# Patient Record
Sex: MALE | Race: WHITE | NOT HISPANIC OR LATINO | Employment: OTHER | URBAN - NONMETROPOLITAN AREA
[De-identification: names, ages, dates, MRNs, and addresses within clinical notes are randomized per-mention and may not be internally consistent; named-entity substitution may affect disease eponyms.]

---

## 2017-02-06 RX ORDER — ATORVASTATIN CALCIUM 20 MG/1
TABLET, FILM COATED ORAL
Qty: 30 TABLET | Refills: 6 | Status: ON HOLD | OUTPATIENT
Start: 2017-02-06 | End: 2019-01-01

## 2017-03-10 PROBLEM — I25.10 CORONARY ARTERY DISEASE INVOLVING NATIVE CORONARY ARTERY: Status: ACTIVE | Noted: 2017-03-10

## 2017-03-10 PROBLEM — E78.5 HYPERLIPIDEMIA: Status: ACTIVE | Noted: 2017-03-10

## 2017-03-10 PROBLEM — R07.9 CHEST PAIN, EXERTIONAL: Status: ACTIVE | Noted: 2017-03-10

## 2017-03-10 PROBLEM — I20.9 ANGINA PECTORIS (HCC): Status: ACTIVE | Noted: 2017-03-10

## 2017-03-10 PROBLEM — I10 HYPERTENSION: Status: ACTIVE | Noted: 2017-03-10

## 2017-03-10 PROBLEM — Z95.1 AORTOCORONARY BYPASS STATUS: Status: ACTIVE | Noted: 2017-03-10

## 2017-03-10 PROBLEM — R06.02 SHORTNESS OF BREATH: Status: ACTIVE | Noted: 2017-03-10

## 2017-03-10 PROBLEM — I48.91 ATRIAL FIBRILLATION (HCC): Status: ACTIVE | Noted: 2017-03-10

## 2017-03-10 PROBLEM — N18.9 CHRONIC KIDNEY FAILURE: Status: ACTIVE | Noted: 2017-03-10

## 2017-03-10 PROBLEM — Z72.0 TOBACCO USE: Status: ACTIVE | Noted: 2017-03-10

## 2017-03-10 PROBLEM — E11.9 TYPE 2 DIABETES MELLITUS (HCC): Status: ACTIVE | Noted: 2017-03-10

## 2017-03-10 PROBLEM — Z98.61 CORONARY ANGIOPLASTY STATUS: Status: ACTIVE | Noted: 2017-03-10

## 2017-03-13 ENCOUNTER — OFFICE VISIT (OUTPATIENT)
Dept: CARDIOLOGY | Facility: CLINIC | Age: 77
End: 2017-03-13

## 2017-03-13 VITALS
SYSTOLIC BLOOD PRESSURE: 118 MMHG | BODY MASS INDEX: 34.72 KG/M2 | WEIGHT: 262 LBS | HEIGHT: 73 IN | HEART RATE: 89 BPM | DIASTOLIC BLOOD PRESSURE: 61 MMHG

## 2017-03-13 DIAGNOSIS — I48.0 PAROXYSMAL ATRIAL FIBRILLATION (HCC): ICD-10-CM

## 2017-03-13 DIAGNOSIS — I25.10 CORONARY ARTERY DISEASE INVOLVING NATIVE CORONARY ARTERY OF NATIVE HEART WITHOUT ANGINA PECTORIS: Primary | ICD-10-CM

## 2017-03-13 DIAGNOSIS — I10 ESSENTIAL HYPERTENSION: ICD-10-CM

## 2017-03-13 DIAGNOSIS — E78.5 HYPERLIPIDEMIA, UNSPECIFIED HYPERLIPIDEMIA TYPE: ICD-10-CM

## 2017-03-13 PROCEDURE — 93000 ELECTROCARDIOGRAM COMPLETE: CPT | Performed by: INTERNAL MEDICINE

## 2017-03-13 PROCEDURE — 99213 OFFICE O/P EST LOW 20 MIN: CPT | Performed by: INTERNAL MEDICINE

## 2017-03-13 NOTE — PROGRESS NOTES
Subjective:     Encounter Date: 03/13/2017      Patient ID: Ha Agrawal is a 76 y.o. male.  With coronary artery disease, status post coronary artery bypass grafting, stent placement, hypertension, hyperlipidemia, paroxysmal atrial fibrillation who presents today for routine follow-up.    Chief Complaint: Routine follow-up    Coronary Artery Disease   Presents for follow-up visit. Symptoms include shortness of breath (Chronic, mild, unchanged). Pertinent negatives include no chest pain, chest pressure, chest tightness, dizziness, leg swelling, muscle weakness, palpitations or weight gain. Risk factors do not include hypertension. The symptoms have been stable. Compliance with diet is variable. Compliance with exercise is variable. Compliance with medications is good.   Hypertension   This is a chronic problem. The problem is unchanged. The problem is controlled. Associated symptoms include shortness of breath (Chronic, mild, unchanged). Pertinent negatives include no chest pain, headaches, malaise/fatigue, neck pain, orthopnea, palpitations, peripheral edema or PND. There are no associated agents to hypertension. Risk factors for coronary artery disease include diabetes mellitus, male gender and obesity. Past treatments include angiotensin blockers, beta blockers and diuretics. The current treatment provides significant improvement. There are no compliance problems.  Hypertensive end-organ damage includes CAD/MI. There is no history of angina or heart failure. Identifiable causes of hypertension include chronic renal disease.   Atrial Fibrillation   Presents for follow-up visit. Symptoms include shortness of breath (Chronic, mild, unchanged). Symptoms are negative for bradycardia, chest pain, dizziness, hemodynamic instability, hypertension, hypotension, palpitations, syncope, tachycardia and weakness. The symptoms have been stable. Past medical history includes atrial fibrillation and CAD. There are no  medication compliance problems.     The patient presents today for routine follow-up of his coronary artery disease as well as atrial fibrillation.  Altogether, the patient notes that he has been doing quite well recently.  He has not had any significant chest discomfort, shortness of breath, dyspnea on exertion (other than mild, chronic symptoms), orthopnea, paroxysmal nocturnal dyspnea, edema, lightheadedness, dizziness, syncope.  The patient was noted to be in atrial fibrillation today but has not been aware of being in atrial fibrillation and has had no recent palpitations.  He remains on Xarelto for anticoagulant therapy and has tolerated this well without any bleeding difficulties.  He has not had any trouble with his medications.  His blood pressure has been under good control.    Current Outpatient Prescriptions:   •  amiodarone (PACERONE) 200 MG tablet, Take 200 mg by mouth Daily., Disp: , Rfl:   •  atorvastatin (LIPITOR) 20 MG tablet, TAKE 1 TABLET BY MOUTH AT BEDTIME, Disp: 30 tablet, Rfl: 6  •  diazepam (VALIUM) 5 MG tablet, Take 5 mg by mouth daily as needed for anxiety., Disp: , Rfl:   •  fenofibrate (TRICOR) 145 MG tablet, Take 145 mg by mouth daily., Disp: , Rfl:   •  furosemide (LASIX) 40 MG tablet, Take 40 mg by mouth daily., Disp: , Rfl:   •  insulin glargine (LANTUS) 100 UNIT/ML injection, Inject  under the skin daily., Disp: , Rfl:   •  insulin regular (NOVOLIN R) 100 UNIT/ML injection, Inject  under the skin daily., Disp: , Rfl:   •  isosorbide mononitrate (IMDUR) 30 MG 24 hr tablet, Take 30 mg by mouth daily., Disp: , Rfl:   •  Loratadine 10 MG capsule, Take 10 mg by mouth daily., Disp: , Rfl:   •  metoprolol succinate XL (TOPROL-XL) 25 MG 24 hr tablet, Take 75 mg by mouth daily., Disp: , Rfl:   •  Multiple Vitamins-Minerals (MULTIVITAMIN ADULT PO), Take  by mouth daily., Disp: , Rfl:   •  olmesartan (BENICAR) 20 MG tablet, Take 20 mg by mouth daily., Disp: , Rfl:   •  Omega-3 Fatty Acids  (FISH OIL) 1200 MG capsule delayed-release, Take 1,200 mg by mouth 2 (two) times a day., Disp: , Rfl:   •  pantoprazole (PROTONIX) 40 MG EC tablet, Take 40 mg by mouth daily., Disp: , Rfl:   •  potassium chloride (K-DUR,KLOR-CON) 10 MEQ CR tablet, Take 10 mEq by mouth daily., Disp: , Rfl:   •  ranolazine (RANEXA) 500 MG 12 hr tablet, Take 1 tablet by mouth 2 (two) times a day., Disp: 60 tablet, Rfl: 11  •  rivaroxaban (XARELTO) 15 MG tablet, Take 15 mg by mouth Daily., Disp: , Rfl:   •  sertraline (ZOLOFT) 25 MG tablet, Take 25 mg by mouth daily., Disp: , Rfl:   •  tamsulosin (FLOMAX) 0.4 MG capsule 24 hr capsule, Take 1 capsule by mouth daily., Disp: , Rfl:   •  vitamin D (ERGOCALCIFEROL) 16838 UNITS capsule capsule, Take 50,000 Units by mouth 1 (one) time per week., Disp: , Rfl:   •  temazepam (RESTORIL) 15 MG capsule, Take 15 mg by mouth at night as needed for sleep., Disp: , Rfl:   •  vitamin E 1000 UNIT capsule, Take 1,000 Units by mouth daily., Disp: , Rfl:     Allergies   Allergen Reactions   • Adhesive Tape Dermatitis   • Penicillins      Review of Systems   Constitution: Negative for chills, fever, weakness, malaise/fatigue, night sweats, weight gain and weight loss.   HENT: Negative for congestion, headaches and sore throat.    Cardiovascular: Positive for dyspnea on exertion (Chronic, mild, unchanged). Negative for chest pain, claudication, irregular heartbeat, leg swelling, orthopnea, palpitations, paroxysmal nocturnal dyspnea and syncope.   Respiratory: Positive for shortness of breath (Chronic, mild, unchanged). Negative for chest tightness, cough, hemoptysis and wheezing.    Endocrine: Negative for cold intolerance, heat intolerance, polydipsia and polyuria.   Hematologic/Lymphatic: Negative for bleeding problem. Does not bruise/bleed easily.   Musculoskeletal: Negative for muscle weakness and neck pain.   Gastrointestinal: Negative for abdominal pain, hematemesis, hematochezia, melena, nausea and  "vomiting.   Genitourinary: Negative for bladder incontinence, dysuria and hematuria.   Neurological: Negative for dizziness, loss of balance, numbness, paresthesias and seizures.       ECG 12 Lead  Date/Time: 3/13/2017 1:49 PM  Performed by: TYLOR MARISCAL  Authorized by: TYLOR MARISCAL   Comparison: compared with previous ECG from 9/1/2015  Comparison to previous ECG: Atrial fibrillation now present  Rhythm: sinus rhythm  Rate: normal  BPM: 89  Conduction: conduction normal  QRS axis: right  Clinical impression: abnormal ECG  Comments: Nonspecific ST and T-wave abnormalities             Objective:     Physical Exam   Constitutional: He is oriented to person, place, and time. He appears well-developed and well-nourished. No distress.   HENT:   Head: Normocephalic and atraumatic.   Mouth/Throat: Oropharynx is clear and moist.   Eyes: EOM are normal. Pupils are equal, round, and reactive to light.   Neck: Normal range of motion. Neck supple. No JVD present. No thyromegaly present.   Cardiovascular: Normal rate, regular rhythm, S1 normal, S2 normal, normal heart sounds and intact distal pulses.  Exam reveals no gallop and no friction rub.    No murmur heard.  Pulmonary/Chest: Effort normal and breath sounds normal.   Abdominal: Soft. Bowel sounds are normal. He exhibits no distension. There is no tenderness.   Musculoskeletal: Normal range of motion. He exhibits no edema.   Neurological: He is alert and oriented to person, place, and time. No cranial nerve deficit.   Skin: Skin is warm and dry. No rash noted. No cyanosis or erythema. Nails show no clubbing.   Psychiatric: He has a normal mood and affect.   Vitals reviewed.    Visit Vitals   • /61 (BP Location: Left arm, Patient Position: Sitting)   • Pulse 89   • Ht 73\" (185.4 cm)   • Wt 262 lb (119 kg)   • BMI 34.57 kg/m2     Data/Lab Review:     Echocardiogram 3/3/15: Left ventricular hypertrophy, normal left ventricular systolic function, " diastolic dysfunction, mild left atrial enlargement    Labs from 3/6/17: White blood cell count 4.9, hemoglobin 14, platelets 230, normal liver function test, LDL cholesterol 103, HDL cholesterol 49, hemoglobin A1c of 5.8, creatinine 1.76, potassium 4.7      Assessment:          Diagnosis Plan   1. Coronary artery disease involving native coronary artery of native heart without angina pectoris  ECG 12 Lead   2. Paroxysmal atrial fibrillation  ECG 12 Lead   3. Essential hypertension     4. Hyperlipidemia, unspecified hyperlipidemia type          Plan:       1.  Coronary artery disease: The patient remains clinically stable at this time.  Continue current medical therapy and follow yearly unless needed sooner.    2.  Paroxysmal atrial fibrillation: The patient remains stable at this time and tolerating anticoagulant therapy well.  Continue current medications.    3.  Essential hypertension: The patient's blood pressure remains under excellent control.  Continue current medicines.    4.  Hyperlipidemia, unspecified: The patient remains on statin therapy and his lipids are followed by his primary care physician.  His most recent lipid panel is listed above.    Follow-up: 12 months unless needed sooner    EMR Dragon/Transcription disclaimer: Much of this encounter note is an electronic transcription/translation of spoken language to printed text. The electronic translation of spoken language may permit erroneous, or at times, nonsensical words or phrases to be inadvertently transcribed; although I have reviewed the note for such errors, some may still exist.

## 2017-12-30 ENCOUNTER — APPOINTMENT (OUTPATIENT)
Dept: GENERAL RADIOLOGY | Facility: HOSPITAL | Age: 77
End: 2017-12-30

## 2017-12-30 ENCOUNTER — HOSPITAL ENCOUNTER (OUTPATIENT)
Facility: HOSPITAL | Age: 77
Setting detail: OBSERVATION
Discharge: HOME | End: 2017-12-31
Attending: FAMILY MEDICINE | Admitting: INTERNAL MEDICINE

## 2017-12-30 DIAGNOSIS — I20.0 UNSTABLE ANGINA (HCC): Primary | ICD-10-CM

## 2017-12-30 DIAGNOSIS — I25.110 CORONARY ARTERY DISEASE INVOLVING NATIVE CORONARY ARTERY OF NATIVE HEART WITH UNSTABLE ANGINA PECTORIS (HCC): ICD-10-CM

## 2017-12-30 DIAGNOSIS — E11.22 TYPE 2 DIABETES MELLITUS WITH STAGE 3 CHRONIC KIDNEY DISEASE, WITH LONG-TERM CURRENT USE OF INSULIN (HCC): Chronic | ICD-10-CM

## 2017-12-30 DIAGNOSIS — Z79.4 TYPE 2 DIABETES MELLITUS WITH STAGE 3 CHRONIC KIDNEY DISEASE, WITH LONG-TERM CURRENT USE OF INSULIN (HCC): Chronic | ICD-10-CM

## 2017-12-30 DIAGNOSIS — N18.30 TYPE 2 DIABETES MELLITUS WITH STAGE 3 CHRONIC KIDNEY DISEASE, WITH LONG-TERM CURRENT USE OF INSULIN (HCC): Chronic | ICD-10-CM

## 2017-12-30 PROBLEM — I48.20 CHRONIC ATRIAL FIBRILLATION (HCC): Chronic | Status: ACTIVE | Noted: 2017-03-10

## 2017-12-30 PROBLEM — I10 ESSENTIAL HYPERTENSION: Chronic | Status: ACTIVE | Noted: 2017-03-10

## 2017-12-30 PROBLEM — E78.2 MIXED HYPERLIPIDEMIA: Chronic | Status: ACTIVE | Noted: 2017-03-10

## 2017-12-30 LAB
ALBUMIN SERPL-MCNC: 4.6 G/DL (ref 3.5–5)
ALBUMIN/GLOB SERPL: 1.3 G/DL (ref 1.1–2.5)
ALP SERPL-CCNC: 79 U/L (ref 24–120)
ALT SERPL W P-5'-P-CCNC: 28 U/L (ref 0–54)
ANION GAP SERPL CALCULATED.3IONS-SCNC: 14 MMOL/L (ref 4–13)
AST SERPL-CCNC: 31 U/L (ref 7–45)
BASOPHILS # BLD AUTO: 0.05 10*3/MM3 (ref 0–0.2)
BASOPHILS NFR BLD AUTO: 0.9 % (ref 0–2)
BILIRUB SERPL-MCNC: 0.4 MG/DL (ref 0.1–1)
BUN BLD-MCNC: 23 MG/DL (ref 5–21)
BUN/CREAT SERPL: 16 (ref 7–25)
CALCIUM SPEC-SCNC: 9.6 MG/DL (ref 8.4–10.4)
CHLORIDE SERPL-SCNC: 105 MMOL/L (ref 98–110)
CO2 SERPL-SCNC: 25 MMOL/L (ref 24–31)
CREAT BLD-MCNC: 1.44 MG/DL (ref 0.5–1.4)
DEPRECATED RDW RBC AUTO: 52.9 FL (ref 40–54)
EOSINOPHIL # BLD AUTO: 0.18 10*3/MM3 (ref 0–0.7)
EOSINOPHIL NFR BLD AUTO: 3.2 % (ref 0–4)
ERYTHROCYTE [DISTWIDTH] IN BLOOD BY AUTOMATED COUNT: 14.5 % (ref 12–15)
GFR SERPL CREATININE-BSD FRML MDRD: 48 ML/MIN/1.73
GLOBULIN UR ELPH-MCNC: 3.6 GM/DL
GLUCOSE BLD-MCNC: 96 MG/DL (ref 70–100)
GLUCOSE BLDC GLUCOMTR-MCNC: 139 MG/DL (ref 70–130)
GLUCOSE BLDC GLUCOMTR-MCNC: 152 MG/DL (ref 70–130)
GLUCOSE BLDC GLUCOMTR-MCNC: 87 MG/DL (ref 70–130)
HBA1C MFR BLD: 6.2 %
HCT VFR BLD AUTO: 38.6 % (ref 40–52)
HGB BLD-MCNC: 12.7 G/DL (ref 14–18)
HOLD SPECIMEN: NORMAL
HOLD SPECIMEN: NORMAL
IMM GRANULOCYTES # BLD: 0.02 10*3/MM3 (ref 0–0.03)
IMM GRANULOCYTES NFR BLD: 0.4 % (ref 0–5)
LYMPHOCYTES # BLD AUTO: 1.71 10*3/MM3 (ref 0.72–4.86)
LYMPHOCYTES NFR BLD AUTO: 30.2 % (ref 15–45)
MCH RBC QN AUTO: 33.3 PG (ref 28–32)
MCHC RBC AUTO-ENTMCNC: 32.9 G/DL (ref 33–36)
MCV RBC AUTO: 101.3 FL (ref 82–95)
MONOCYTES # BLD AUTO: 0.71 10*3/MM3 (ref 0.19–1.3)
MONOCYTES NFR BLD AUTO: 12.5 % (ref 4–12)
NEUTROPHILS # BLD AUTO: 2.99 10*3/MM3 (ref 1.87–8.4)
NEUTROPHILS NFR BLD AUTO: 52.8 % (ref 39–78)
NRBC BLD MANUAL-RTO: 0 /100 WBC (ref 0–0)
PLATELET # BLD AUTO: 213 10*3/MM3 (ref 130–400)
PMV BLD AUTO: 9.9 FL (ref 6–12)
POTASSIUM BLD-SCNC: 3.9 MMOL/L (ref 3.5–5.3)
PROT SERPL-MCNC: 8.2 G/DL (ref 6.3–8.7)
RBC # BLD AUTO: 3.81 10*6/MM3 (ref 4.8–5.9)
SODIUM BLD-SCNC: 144 MMOL/L (ref 135–145)
TROPONIN I SERPL-MCNC: <0.012 NG/ML (ref 0–0.03)
WBC NRBC COR # BLD: 5.66 10*3/MM3 (ref 4.8–10.8)
WHOLE BLOOD HOLD SPECIMEN: NORMAL
WHOLE BLOOD HOLD SPECIMEN: NORMAL

## 2017-12-30 PROCEDURE — G0378 HOSPITAL OBSERVATION PER HR: HCPCS

## 2017-12-30 PROCEDURE — 84484 ASSAY OF TROPONIN QUANT: CPT | Performed by: INTERNAL MEDICINE

## 2017-12-30 PROCEDURE — 71010 HC CHEST PA OR AP: CPT

## 2017-12-30 PROCEDURE — 93005 ELECTROCARDIOGRAM TRACING: CPT | Performed by: INTERNAL MEDICINE

## 2017-12-30 PROCEDURE — 84484 ASSAY OF TROPONIN QUANT: CPT | Performed by: FAMILY MEDICINE

## 2017-12-30 PROCEDURE — 85025 COMPLETE CBC W/AUTO DIFF WBC: CPT | Performed by: FAMILY MEDICINE

## 2017-12-30 PROCEDURE — 83036 HEMOGLOBIN GLYCOSYLATED A1C: CPT | Performed by: NURSE PRACTITIONER

## 2017-12-30 PROCEDURE — 99285 EMERGENCY DEPT VISIT HI MDM: CPT

## 2017-12-30 PROCEDURE — 93005 ELECTROCARDIOGRAM TRACING: CPT | Performed by: FAMILY MEDICINE

## 2017-12-30 PROCEDURE — 82962 GLUCOSE BLOOD TEST: CPT

## 2017-12-30 PROCEDURE — 93010 ELECTROCARDIOGRAM REPORT: CPT | Performed by: INTERNAL MEDICINE

## 2017-12-30 PROCEDURE — 80053 COMPREHEN METABOLIC PANEL: CPT | Performed by: FAMILY MEDICINE

## 2017-12-30 PROCEDURE — 63710000001 INSULIN DETEMIR PER 5 UNITS: Performed by: NURSE PRACTITIONER

## 2017-12-30 PROCEDURE — 99220 PR INITIAL OBSERVATION CARE/DAY 70 MINUTES: CPT | Performed by: INTERNAL MEDICINE

## 2017-12-30 RX ORDER — AMIODARONE HYDROCHLORIDE 200 MG/1
200 TABLET ORAL DAILY
Status: DISCONTINUED | OUTPATIENT
Start: 2017-12-30 | End: 2017-12-31 | Stop reason: HOSPADM

## 2017-12-30 RX ORDER — SODIUM CHLORIDE 0.9 % (FLUSH) 0.9 %
10 SYRINGE (ML) INJECTION AS NEEDED
Status: DISCONTINUED | OUTPATIENT
Start: 2017-12-30 | End: 2017-12-31 | Stop reason: HOSPADM

## 2017-12-30 RX ORDER — DIAZEPAM 5 MG/1
5 TABLET ORAL DAILY PRN
Status: DISCONTINUED | OUTPATIENT
Start: 2017-12-30 | End: 2017-12-31 | Stop reason: HOSPADM

## 2017-12-30 RX ORDER — TEMAZEPAM 15 MG/1
15 CAPSULE ORAL NIGHTLY PRN
Status: DISCONTINUED | OUTPATIENT
Start: 2017-12-30 | End: 2017-12-31 | Stop reason: HOSPADM

## 2017-12-30 RX ORDER — TAMSULOSIN HYDROCHLORIDE 0.4 MG/1
0.4 CAPSULE ORAL DAILY
Status: DISCONTINUED | OUTPATIENT
Start: 2017-12-30 | End: 2017-12-31 | Stop reason: HOSPADM

## 2017-12-30 RX ORDER — ISOSORBIDE MONONITRATE 30 MG/1
30 TABLET, EXTENDED RELEASE ORAL DAILY
Status: DISCONTINUED | OUTPATIENT
Start: 2017-12-30 | End: 2017-12-31 | Stop reason: HOSPADM

## 2017-12-30 RX ORDER — MULTIVIT WITH MINERALS/LUTEIN
1000 TABLET ORAL DAILY
Status: DISCONTINUED | OUTPATIENT
Start: 2017-12-30 | End: 2017-12-31 | Stop reason: HOSPADM

## 2017-12-30 RX ORDER — RANOLAZINE 500 MG/1
500 TABLET, EXTENDED RELEASE ORAL EVERY 12 HOURS SCHEDULED
Status: DISCONTINUED | OUTPATIENT
Start: 2017-12-30 | End: 2017-12-31 | Stop reason: HOSPADM

## 2017-12-30 RX ORDER — SODIUM CHLORIDE 0.9 % (FLUSH) 0.9 %
1-10 SYRINGE (ML) INJECTION AS NEEDED
Status: DISCONTINUED | OUTPATIENT
Start: 2017-12-30 | End: 2017-12-31 | Stop reason: HOSPADM

## 2017-12-30 RX ORDER — NICOTINE POLACRILEX 4 MG
15 LOZENGE BUCCAL
Status: DISCONTINUED | OUTPATIENT
Start: 2017-12-30 | End: 2017-12-31 | Stop reason: HOSPADM

## 2017-12-30 RX ORDER — OLMESARTAN MEDOXOMIL 20 MG/1
20 TABLET ORAL DAILY
Status: DISCONTINUED | OUTPATIENT
Start: 2017-12-30 | End: 2017-12-31 | Stop reason: HOSPADM

## 2017-12-30 RX ORDER — POTASSIUM CHLORIDE 750 MG/1
10 CAPSULE, EXTENDED RELEASE ORAL DAILY
Status: DISCONTINUED | OUTPATIENT
Start: 2017-12-30 | End: 2017-12-31 | Stop reason: HOSPADM

## 2017-12-30 RX ORDER — FUROSEMIDE 40 MG/1
40 TABLET ORAL DAILY
Status: DISCONTINUED | OUTPATIENT
Start: 2017-12-30 | End: 2017-12-31 | Stop reason: HOSPADM

## 2017-12-30 RX ORDER — PANTOPRAZOLE SODIUM 40 MG/1
40 TABLET, DELAYED RELEASE ORAL DAILY
Status: DISCONTINUED | OUTPATIENT
Start: 2017-12-30 | End: 2017-12-31 | Stop reason: HOSPADM

## 2017-12-30 RX ORDER — ATORVASTATIN CALCIUM 40 MG/1
40 TABLET, FILM COATED ORAL NIGHTLY
Status: DISCONTINUED | OUTPATIENT
Start: 2017-12-30 | End: 2017-12-31 | Stop reason: HOSPADM

## 2017-12-30 RX ORDER — DEXTROSE MONOHYDRATE 25 G/50ML
25 INJECTION, SOLUTION INTRAVENOUS
Status: DISCONTINUED | OUTPATIENT
Start: 2017-12-30 | End: 2017-12-31 | Stop reason: HOSPADM

## 2017-12-30 RX ADMIN — PANTOPRAZOLE SODIUM 40 MG: 40 TABLET, DELAYED RELEASE ORAL at 10:46

## 2017-12-30 RX ADMIN — AMIODARONE HYDROCHLORIDE 200 MG: 200 TABLET ORAL at 10:47

## 2017-12-30 RX ADMIN — TAMSULOSIN HYDROCHLORIDE 0.4 MG: 0.4 CAPSULE ORAL at 10:47

## 2017-12-30 RX ADMIN — RANOLAZINE 500 MG: 500 TABLET, FILM COATED, EXTENDED RELEASE ORAL at 10:47

## 2017-12-30 RX ADMIN — DESMOPRESSIN ACETATE 40 MG: 0.2 TABLET ORAL at 20:53

## 2017-12-30 RX ADMIN — Medication 1000 UNITS: at 10:46

## 2017-12-30 RX ADMIN — OLMESARTAN MEDOXOMIL 20 MG: 20 TABLET, FILM COATED ORAL at 10:46

## 2017-12-30 RX ADMIN — ISOSORBIDE MONONITRATE 30 MG: 30 TABLET, EXTENDED RELEASE ORAL at 10:47

## 2017-12-30 RX ADMIN — RIVAROXABAN 15 MG: 15 TABLET, FILM COATED ORAL at 10:46

## 2017-12-30 RX ADMIN — SERTRALINE 25 MG: 50 TABLET, FILM COATED ORAL at 10:48

## 2017-12-30 RX ADMIN — RANOLAZINE 500 MG: 500 TABLET, FILM COATED, EXTENDED RELEASE ORAL at 20:53

## 2017-12-30 RX ADMIN — POTASSIUM CHLORIDE 10 MEQ: 750 CAPSULE, EXTENDED RELEASE ORAL at 10:46

## 2017-12-30 RX ADMIN — INSULIN DETEMIR 25 UNITS: 100 INJECTION, SOLUTION SUBCUTANEOUS at 21:27

## 2017-12-30 RX ADMIN — FUROSEMIDE 40 MG: 40 TABLET ORAL at 10:47

## 2017-12-31 ENCOUNTER — APPOINTMENT (OUTPATIENT)
Dept: CARDIOLOGY | Facility: HOSPITAL | Age: 77
End: 2017-12-31
Attending: INTERNAL MEDICINE

## 2017-12-31 VITALS
HEART RATE: 82 BPM | SYSTOLIC BLOOD PRESSURE: 126 MMHG | OXYGEN SATURATION: 96 % | RESPIRATION RATE: 18 BRPM | DIASTOLIC BLOOD PRESSURE: 86 MMHG | BODY MASS INDEX: 34.52 KG/M2 | HEIGHT: 74 IN | TEMPERATURE: 98.1 F | WEIGHT: 268.96 LBS

## 2017-12-31 LAB
ANION GAP SERPL CALCULATED.3IONS-SCNC: 12 MMOL/L (ref 4–13)
BH CV STRESS BP STAGE 1: NORMAL
BH CV STRESS BP STAGE 2: NORMAL
BH CV STRESS DOSE DOBUTAMINE STAGE 1: 10
BH CV STRESS DOSE DOBUTAMINE STAGE 2: 20
BH CV STRESS DURATION MIN STAGE 1: 2
BH CV STRESS DURATION MIN STAGE 2: 2
BH CV STRESS DURATION SEC STAGE 1: 0
BH CV STRESS DURATION SEC STAGE 2: 0
BH CV STRESS HR STAGE 1: 91
BH CV STRESS HR STAGE 2: 145
BH CV STRESS PROTOCOL 1: NORMAL
BH CV STRESS RECOVERY BP: NORMAL MMHG
BH CV STRESS RECOVERY HR: 86 BPM
BH CV STRESS STAGE 1: 1
BH CV STRESS STAGE 2: 2
BUN BLD-MCNC: 21 MG/DL (ref 5–21)
BUN/CREAT SERPL: 14.7 (ref 7–25)
CALCIUM SPEC-SCNC: 9.3 MG/DL (ref 8.4–10.4)
CHLORIDE SERPL-SCNC: 104 MMOL/L (ref 98–110)
CO2 SERPL-SCNC: 27 MMOL/L (ref 24–31)
CREAT BLD-MCNC: 1.43 MG/DL (ref 0.5–1.4)
GFR SERPL CREATININE-BSD FRML MDRD: 48 ML/MIN/1.73
GLUCOSE BLD-MCNC: 118 MG/DL (ref 70–100)
GLUCOSE BLDC GLUCOMTR-MCNC: 103 MG/DL (ref 70–130)
GLUCOSE BLDC GLUCOMTR-MCNC: 104 MG/DL (ref 70–130)
MAXIMAL PREDICTED HEART RATE: 145 BPM
PERCENT MAX PREDICTED HR: 100 %
POTASSIUM BLD-SCNC: 3.7 MMOL/L (ref 3.5–5.3)
SODIUM BLD-SCNC: 143 MMOL/L (ref 135–145)
STRESS BASELINE BP: NORMAL MMHG
STRESS BASELINE HR: 78 BPM
STRESS PERCENT HR: 118 %
STRESS POST EXERCISE DUR MIN: 6 MIN
STRESS POST EXERCISE DUR SEC: 12 SEC
STRESS POST PEAK BP: NORMAL MMHG
STRESS POST PEAK HR: 145 BPM
STRESS TARGET HR: 123 BPM

## 2017-12-31 PROCEDURE — 82962 GLUCOSE BLOOD TEST: CPT

## 2017-12-31 PROCEDURE — 93017 CV STRESS TEST TRACING ONLY: CPT

## 2017-12-31 PROCEDURE — 93350 STRESS TTE ONLY: CPT

## 2017-12-31 PROCEDURE — 93018 CV STRESS TEST I&R ONLY: CPT | Performed by: INTERNAL MEDICINE

## 2017-12-31 PROCEDURE — 25010000003 DOBUTAMINE PER 250 MG: Performed by: INTERNAL MEDICINE

## 2017-12-31 PROCEDURE — 99217 PR OBSERVATION CARE DISCHARGE MANAGEMENT: CPT | Performed by: INTERNAL MEDICINE

## 2017-12-31 PROCEDURE — 93350 STRESS TTE ONLY: CPT | Performed by: INTERNAL MEDICINE

## 2017-12-31 PROCEDURE — 93352 ADMIN ECG CONTRAST AGENT: CPT | Performed by: INTERNAL MEDICINE

## 2017-12-31 PROCEDURE — 80048 BASIC METABOLIC PNL TOTAL CA: CPT | Performed by: INTERNAL MEDICINE

## 2017-12-31 PROCEDURE — 25010000002 PERFLUTREN 6.52 MG/ML SUSPENSION: Performed by: INTERNAL MEDICINE

## 2017-12-31 PROCEDURE — G0378 HOSPITAL OBSERVATION PER HR: HCPCS

## 2017-12-31 RX ORDER — DOBUTAMINE HYDROCHLORIDE 100 MG/100ML
10-50 INJECTION INTRAVENOUS CONTINUOUS
Status: DISCONTINUED | OUTPATIENT
Start: 2017-12-31 | End: 2017-12-31 | Stop reason: HOSPADM

## 2017-12-31 RX ADMIN — ISOSORBIDE MONONITRATE 30 MG: 30 TABLET, EXTENDED RELEASE ORAL at 09:27

## 2017-12-31 RX ADMIN — TAMSULOSIN HYDROCHLORIDE 0.4 MG: 0.4 CAPSULE ORAL at 09:30

## 2017-12-31 RX ADMIN — AMIODARONE HYDROCHLORIDE 200 MG: 200 TABLET ORAL at 09:27

## 2017-12-31 RX ADMIN — FUROSEMIDE 40 MG: 40 TABLET ORAL at 09:27

## 2017-12-31 RX ADMIN — RANOLAZINE 500 MG: 500 TABLET, FILM COATED, EXTENDED RELEASE ORAL at 09:26

## 2017-12-31 RX ADMIN — POTASSIUM CHLORIDE 10 MEQ: 750 CAPSULE, EXTENDED RELEASE ORAL at 09:30

## 2017-12-31 RX ADMIN — Medication 10 MCG/KG/MIN: at 10:32

## 2017-12-31 RX ADMIN — Medication 1000 UNITS: at 09:27

## 2017-12-31 RX ADMIN — RIVAROXABAN 15 MG: 15 TABLET, FILM COATED ORAL at 09:26

## 2017-12-31 RX ADMIN — SERTRALINE 25 MG: 50 TABLET, FILM COATED ORAL at 09:26

## 2017-12-31 RX ADMIN — OLMESARTAN MEDOXOMIL 20 MG: 20 TABLET, FILM COATED ORAL at 09:27

## 2017-12-31 RX ADMIN — PERFLUTREN 10 MG: 6.52 INJECTION, SUSPENSION INTRAVENOUS at 10:33

## 2017-12-31 RX ADMIN — PANTOPRAZOLE SODIUM 40 MG: 40 TABLET, DELAYED RELEASE ORAL at 09:28

## 2017-12-31 RX ADMIN — METOPROLOL SUCCINATE 75 MG: 50 TABLET, FILM COATED, EXTENDED RELEASE ORAL at 09:26

## 2018-01-11 ENCOUNTER — OFFICE VISIT (OUTPATIENT)
Dept: CARDIOLOGY | Facility: CLINIC | Age: 78
End: 2018-01-11

## 2018-01-11 VITALS
OXYGEN SATURATION: 96 % | WEIGHT: 268 LBS | SYSTOLIC BLOOD PRESSURE: 110 MMHG | HEIGHT: 74 IN | HEART RATE: 82 BPM | BODY MASS INDEX: 34.39 KG/M2 | DIASTOLIC BLOOD PRESSURE: 64 MMHG

## 2018-01-11 DIAGNOSIS — N18.30 STAGE 3 CHRONIC KIDNEY DISEASE (HCC): Chronic | ICD-10-CM

## 2018-01-11 DIAGNOSIS — Z79.4 TYPE 2 DIABETES MELLITUS WITH STAGE 3 CHRONIC KIDNEY DISEASE, WITH LONG-TERM CURRENT USE OF INSULIN (HCC): Chronic | ICD-10-CM

## 2018-01-11 DIAGNOSIS — Z95.1 HX OF CABG: ICD-10-CM

## 2018-01-11 DIAGNOSIS — E11.22 TYPE 2 DIABETES MELLITUS WITH STAGE 3 CHRONIC KIDNEY DISEASE, WITH LONG-TERM CURRENT USE OF INSULIN (HCC): Chronic | ICD-10-CM

## 2018-01-11 DIAGNOSIS — R06.09 DYSPNEA ON EXERTION: ICD-10-CM

## 2018-01-11 DIAGNOSIS — I25.110 CORONARY ARTERY DISEASE INVOLVING NATIVE CORONARY ARTERY OF NATIVE HEART WITH UNSTABLE ANGINA PECTORIS (HCC): Primary | ICD-10-CM

## 2018-01-11 DIAGNOSIS — I10 ESSENTIAL HYPERTENSION: Chronic | ICD-10-CM

## 2018-01-11 DIAGNOSIS — N18.30 TYPE 2 DIABETES MELLITUS WITH STAGE 3 CHRONIC KIDNEY DISEASE, WITH LONG-TERM CURRENT USE OF INSULIN (HCC): Chronic | ICD-10-CM

## 2018-01-11 DIAGNOSIS — Z95.5 HISTORY OF CORONARY ARTERY STENT PLACEMENT: ICD-10-CM

## 2018-01-11 DIAGNOSIS — I48.20 CHRONIC ATRIAL FIBRILLATION (HCC): Chronic | ICD-10-CM

## 2018-01-11 DIAGNOSIS — Z79.01 CHRONIC ANTICOAGULATION: ICD-10-CM

## 2018-01-11 DIAGNOSIS — I50.32 CHRONIC DIASTOLIC CONGESTIVE HEART FAILURE (HCC): ICD-10-CM

## 2018-01-11 DIAGNOSIS — E78.2 MIXED HYPERLIPIDEMIA: Chronic | ICD-10-CM

## 2018-01-11 PROCEDURE — 99214 OFFICE O/P EST MOD 30 MIN: CPT | Performed by: NURSE PRACTITIONER

## 2018-01-11 RX ORDER — ISOSORBIDE MONONITRATE 60 MG/1
60 TABLET, EXTENDED RELEASE ORAL DAILY
Qty: 90 TABLET | Refills: 3 | Status: SHIPPED | OUTPATIENT
Start: 2018-01-11 | End: 2018-09-17 | Stop reason: SDUPTHER

## 2018-01-11 NOTE — PROGRESS NOTES
Subjective:     Encounter Date:01/11/2018      Patient ID: Ha Agrawal is a 77 y.o. male. He presents today for two week follow up of hospital discharge for chest pain with a negative lexiscan. He has a history of coronary artery disease s/p coronary artery bypass grafting and coronary artery stenting, chronic diastolic congestive heart failure, chronic atrial fibrillation on chronic anticoagulation, hypertension, hyperlipidemia, stage III chronic kidney disease, type II diabetes mellitus and obesity. He continues to complain of dyspnea with exertion and fatigue. He denies any additional chest pain since hospital discharge. He denies any palpitation, dizziness, syncope, orthopnea, PND or swelling. He reports that blood pressure and cholesterol are well controlled. He denies any signs of bleeding. He states that overall he still does not feel well.       Chief Complaint:  Coronary Artery Disease   Presents for follow-up visit. Pertinent negatives include no chest pain, chest pressure, chest tightness, dizziness, leg swelling, muscle weakness, palpitations, shortness of breath or weight gain. Risk factors include hyperlipidemia. Risk factors do not include hypertension. His past medical history is significant for CHF. The symptoms have been stable. Compliance with diet is variable. Compliance with exercise is variable. Compliance with medications is good.   Congestive Heart Failure   Presents for follow-up visit. Pertinent negatives include no abdominal pain, chest pain, chest pressure, claudication, edema, fatigue, muscle weakness, near-syncope, nocturia, orthopnea, palpitations, paroxysmal nocturnal dyspnea, shortness of breath or unexpected weight change. The symptoms have been stable. His past medical history is significant for CAD.   Atrial Fibrillation   Presents for follow-up visit. Symptoms are negative for an AICD problem, bradycardia, chest pain, dizziness, hemodynamic instability, hypertension,  hypotension, pacemaker problem, palpitations, shortness of breath, syncope, tachycardia and weakness. The symptoms have been stable. Past medical history includes atrial fibrillation, CAD, CHF and hyperlipidemia.   Hypertension   This is a chronic problem. The current episode started more than 1 year ago. The problem is controlled. Associated symptoms include malaise/fatigue. Pertinent negatives include no anxiety, blurred vision, chest pain, headaches, orthopnea, palpitations, peripheral edema, PND, shortness of breath or sweats. Past treatments include beta blockers. The current treatment provides significant improvement. Hypertensive end-organ damage includes kidney disease, CAD/MI and heart failure.   Hyperlipidemia   This is a chronic problem. The current episode started more than 1 year ago. The problem is controlled. Recent lipid tests were reviewed and are normal. Pertinent negatives include no chest pain or shortness of breath. Current antihyperlipidemic treatment includes statins. The current treatment provides significant improvement of lipids. Risk factors for coronary artery disease include hypertension, male sex, obesity, diabetes mellitus and dyslipidemia.       The following portions of the patient's history were reviewed and updated as appropriate: allergies, current medications, past family history, past medical history, past social history, past surgical history and problem list.     Allergies   Allergen Reactions   • Adhesive Tape Dermatitis   • Penicillins      Current outpatient and discharge medications have been reconciled for the patient.  CLARIBEL Yu    Current Outpatient Prescriptions:   •  amiodarone (PACERONE) 200 MG tablet, Take 200 mg by mouth Daily., Disp: , Rfl:   •  atorvastatin (LIPITOR) 20 MG tablet, TAKE 1 TABLET BY MOUTH AT BEDTIME, Disp: 30 tablet, Rfl: 6  •  diazepam (VALIUM) 5 MG tablet, Take 5 mg by mouth daily as needed for anxiety., Disp: , Rfl:   •  fenofibrate  (TRICOR) 145 MG tablet, Take 145 mg by mouth daily., Disp: , Rfl:   •  furosemide (LASIX) 40 MG tablet, Take 40 mg by mouth daily., Disp: , Rfl:   •  insulin glargine (LANTUS) 100 UNIT/ML injection, Inject  under the skin daily., Disp: , Rfl:   •  insulin regular (NOVOLIN R) 100 UNIT/ML injection, Inject  under the skin daily., Disp: , Rfl:   •  isosorbide mononitrate (IMDUR) 60 MG 24 hr tablet, Take 1 tablet by mouth Daily., Disp: 90 tablet, Rfl: 3  •  Loratadine 10 MG capsule, Take 10 mg by mouth daily., Disp: , Rfl:   •  metoprolol succinate XL (TOPROL-XL) 25 MG 24 hr tablet, Take 75 mg by mouth daily., Disp: , Rfl:   •  Multiple Vitamins-Minerals (MULTIVITAMIN ADULT PO), Take  by mouth daily., Disp: , Rfl:   •  olmesartan (BENICAR) 20 MG tablet, Take 20 mg by mouth daily., Disp: , Rfl:   •  Omega-3 Fatty Acids (FISH OIL) 1200 MG capsule delayed-release, Take 1,200 mg by mouth 2 (two) times a day., Disp: , Rfl:   •  pantoprazole (PROTONIX) 40 MG EC tablet, Take 40 mg by mouth daily., Disp: , Rfl:   •  potassium chloride (K-DUR,KLOR-CON) 10 MEQ CR tablet, Take 10 mEq by mouth daily., Disp: , Rfl:   •  ranolazine (RANEXA) 500 MG 12 hr tablet, Take 1 tablet by mouth 2 (two) times a day., Disp: 60 tablet, Rfl: 11  •  rivaroxaban (XARELTO) 15 MG tablet, Take 15 mg by mouth Daily., Disp: , Rfl:   •  sertraline (ZOLOFT) 25 MG tablet, Take 25 mg by mouth daily., Disp: , Rfl:   •  tamsulosin (FLOMAX) 0.4 MG capsule 24 hr capsule, Take 1 capsule by mouth daily., Disp: , Rfl:   •  vitamin D (ERGOCALCIFEROL) 04661 UNITS capsule capsule, Take 50,000 Units by mouth 1 (one) time per week., Disp: , Rfl:   •  vitamin E 1000 UNIT capsule, Take 1,000 Units by mouth daily., Disp: , Rfl:   Past Medical History:   Diagnosis Date   • Angina pectoris    • Aortocoronary bypass status    • Atrial fibrillation    • CAD (coronary artery disease), native coronary artery     : CABG and PCI   • Chest pain on exertion    • Chest pain,  exertional    • Chronic kidney disease    • Coronary angioplasty status    • Diabetes mellitus    • Essential hypertension    • Hyperlipidemia    • Hypertension    • Mixed hyperlipidemia    • Paroxysmal atrial fibrillation    • Shortness of breath    • Tobacco use      Past Surgical History:   Procedure Laterality Date   • CARDIAC CATHETERIZATION     • CORONARY ARTERY BYPASS GRAFT     • CORONARY STENT PLACEMENT  03/2015     Family History   Problem Relation Age of Onset   • Heart attack Mother    • Heart disease Mother    • Heart attack Father    • Heart disease Father    • Heart disease Brother    • Heart disease Maternal Grandmother    • Heart attack Maternal Grandmother    • Heart disease Maternal Grandfather    • Heart attack Maternal Grandfather    • Heart disease Paternal Grandmother    • Heart attack Paternal Grandmother    • Heart disease Paternal Grandfather    • Heart attack Paternal Grandfather      Social History     Social History   • Marital status:      Spouse name: N/A   • Number of children: N/A   • Years of education: N/A     Occupational History   • Not on file.     Social History Main Topics   • Smoking status: Former Smoker   • Smokeless tobacco: Never Used      Comment: QUIT AT AGE 63   • Alcohol use No   • Drug use: No   • Sexual activity: Defer     Other Topics Concern   • Not on file     Social History Narrative         Review of Systems   Constitution: Positive for malaise/fatigue. Negative for chills, decreased appetite, fatigue, fever, weakness, night sweats, unexpected weight change, weight gain and weight loss.   HENT: Negative for nosebleeds.    Eyes: Negative for blurred vision and visual disturbance.   Cardiovascular: Positive for dyspnea on exertion. Negative for chest pain, claudication, leg swelling, near-syncope, orthopnea, palpitations, paroxysmal nocturnal dyspnea and syncope.   Respiratory: Negative for chest tightness, cough, hemoptysis, shortness of breath, snoring  "and wheezing.    Endocrine: Negative for cold intolerance and heat intolerance.   Hematologic/Lymphatic: Does not bruise/bleed easily.   Skin: Negative for rash.   Musculoskeletal: Negative for back pain, falls and muscle weakness.   Gastrointestinal: Negative for abdominal pain, change in bowel habit, constipation, diarrhea, dysphagia, heartburn, nausea and vomiting.   Genitourinary: Negative for hematuria and nocturia.   Neurological: Negative for dizziness, headaches and light-headedness.   Psychiatric/Behavioral: Negative for altered mental status.   Allergic/Immunologic: Negative for persistent infections.       Procedures  /64  Pulse 82  Ht 188 cm (74\")  Wt 122 kg (268 lb)  SpO2 96%  BMI 34.41 kg/m2       Objective:     Physical Exam   Constitutional: He is oriented to person, place, and time. Vital signs are normal. He appears well-developed and well-nourished. No distress.   HENT:   Head: Normocephalic and atraumatic.   Right Ear: External ear normal.   Left Ear: External ear normal.   Eyes: Conjunctivae are normal. Pupils are equal, round, and reactive to light. Right eye exhibits no discharge. Left eye exhibits no discharge.   Neck: Normal range of motion. Neck supple. No JVD present. Carotid bruit is not present. No thyromegaly present.   Cardiovascular: Normal rate, normal heart sounds and intact distal pulses.  An irregularly irregular rhythm present. PMI is not displaced.  Exam reveals no gallop, no friction rub and no decreased pulses.    No murmur heard.  Pulses:       Radial pulses are 2+ on the right side, and 2+ on the left side.        Dorsalis pedis pulses are 2+ on the right side, and 2+ on the left side.        Posterior tibial pulses are 2+ on the right side, and 2+ on the left side.   Pulmonary/Chest: Effort normal. No respiratory distress. He has no decreased breath sounds. He has no wheezes. He has no rhonchi. He has rales in the right lower field and the left lower field. He " exhibits no tenderness.   Abdominal: Soft. Bowel sounds are normal. He exhibits no distension. There is no tenderness.   Musculoskeletal: Normal range of motion. He exhibits no edema.   Neurological: He is alert and oriented to person, place, and time.   Skin: Skin is warm and dry. No rash noted. He is not diaphoretic. No erythema. No pallor.   Psychiatric: He has a normal mood and affect. His behavior is normal. Judgment and thought content normal.   Vitals reviewed.      Lab Review:   Lab Results   Component Value Date    WBC 5.66 12/30/2017    HGB 12.7 (L) 12/30/2017    HCT 38.6 (L) 12/30/2017    .3 (H) 12/30/2017     12/30/2017     Lab Results   Component Value Date    GLUCOSE 118 (H) 12/31/2017    BUN 21 12/31/2017    CREATININE 1.43 (H) 12/31/2017    EGFRIFNONA 48 (L) 12/31/2017    BCR 14.7 12/31/2017    K 3.7 12/31/2017    CO2 27.0 12/31/2017    CALCIUM 9.3 12/31/2017    ALBUMIN 4.60 12/30/2017    LABIL2 1.3 12/30/2017    AST 31 12/30/2017    ALT 28 12/30/2017     Lab Results   Component Value Date    CHLPL 134 04/28/2015    CHLPL 159 02/28/2015     Lab Results   Component Value Date    TRIG 250 (H) 04/28/2015    TRIG 418 (H) 02/28/2015     Lab Results   Component Value Date    HDL 27 04/28/2015    HDL 26 02/28/2015     Lab Results   Component Value Date    LDLDIRECT 66 04/28/2015    LDLDIRECT 83 02/28/2015           Assessment:          Diagnosis Plan   1. Coronary artery disease involving native coronary artery of native heart with unstable angina pectoris  Increase Imdur to 60 mg by mouth daily.    2. Hx of CABG     3. History of coronary artery stent placement     4. Essential hypertension  Well controlled.    5. Mixed hyperlipidemia  Well controlled.    6. Chronic atrial fibrillation  Rate controlled and anticoagulated.   7. Chronic anticoagulation  On xarelto. Denies signs of bleeding.    8. Stage 3 chronic kidney disease     9. Type 2 diabetes mellitus with stage 3 chronic kidney disease,  with long-term current use of insulin     10. Dyspnea on exertion  Adult Transthoracic Echo Complete W/ Cont if Necessary Per Protocol   11.     Chronic diastolic congestive heart failure       Plan:       1. 2d echo.  2. Increase Imdur to 60 mg by mouth daily.  3. Continue all other medications as previously prescribed.  4. Reviewed signs and symptoms of CHF and what to report with the patient. Patient instructed to restrict sodium and weigh daily. Report weight gain of greater than 2 lbs overnight or 5 lbs in 1 week. Pt verbalized understanding of instructions and plan of care.   5. Report any worsening symptoms.   6. Follow up with Dr. Weinstein in one month, or sooner if needed.

## 2018-01-19 ENCOUNTER — HOSPITAL ENCOUNTER (OUTPATIENT)
Dept: CARDIOLOGY | Facility: HOSPITAL | Age: 78
Discharge: HOME | End: 2018-01-19
Admitting: NURSE PRACTITIONER

## 2018-01-19 VITALS
HEIGHT: 74 IN | SYSTOLIC BLOOD PRESSURE: 113 MMHG | WEIGHT: 268 LBS | DIASTOLIC BLOOD PRESSURE: 61 MMHG | BODY MASS INDEX: 34.39 KG/M2

## 2018-01-19 DIAGNOSIS — R06.09 DYSPNEA ON EXERTION: ICD-10-CM

## 2018-01-19 PROCEDURE — 25010000002 PERFLUTREN 6.52 MG/ML SUSPENSION: Performed by: INTERNAL MEDICINE

## 2018-01-19 PROCEDURE — 93306 TTE W/DOPPLER COMPLETE: CPT | Performed by: INTERNAL MEDICINE

## 2018-01-19 PROCEDURE — 93306 TTE W/DOPPLER COMPLETE: CPT

## 2018-01-19 RX ADMIN — PERFLUTREN 9.78 MG: 6.52 INJECTION, SUSPENSION INTRAVENOUS at 13:19

## 2018-01-22 LAB
BH CV ECHO MEAS - AO MAX PG (FULL): -0.5 MMHG
BH CV ECHO MEAS - AO MAX PG: 4.1 MMHG
BH CV ECHO MEAS - AO MEAN PG (FULL): 0 MMHG
BH CV ECHO MEAS - AO MEAN PG: 2 MMHG
BH CV ECHO MEAS - AO ROOT AREA (BSA CORRECTED): 1.3
BH CV ECHO MEAS - AO ROOT AREA: 8 CM^2
BH CV ECHO MEAS - AO ROOT DIAM: 3.2 CM
BH CV ECHO MEAS - AO V2 MAX: 101 CM/SEC
BH CV ECHO MEAS - AO V2 MEAN: 68 CM/SEC
BH CV ECHO MEAS - AO V2 VTI: 21.9 CM
BH CV ECHO MEAS - AVA(I,A): 2.5 CM^2
BH CV ECHO MEAS - AVA(I,D): 2.5 CM^2
BH CV ECHO MEAS - AVA(V,A): 3.3 CM^2
BH CV ECHO MEAS - AVA(V,D): 3.3 CM^2
BH CV ECHO MEAS - BSA(HAYCOCK): 2.5 M^2
BH CV ECHO MEAS - BSA: 2.4 M^2
BH CV ECHO MEAS - BZI_BMI: 34.3 KILOGRAMS/M^2
BH CV ECHO MEAS - BZI_METRIC_HEIGHT: 185.4 CM
BH CV ECHO MEAS - BZI_METRIC_WEIGHT: 117.9 KG
BH CV ECHO MEAS - CONTRAST EF 4CH: 53.7 ML/M^2
BH CV ECHO MEAS - EDV(CUBED): 168.7 ML
BH CV ECHO MEAS - EDV(MOD-SP4): 162 ML
BH CV ECHO MEAS - EDV(TEICH): 149 ML
BH CV ECHO MEAS - EF(CUBED): 50.9 %
BH CV ECHO MEAS - EF(TEICH): 42.4 %
BH CV ECHO MEAS - ESV(CUBED): 82.9 ML
BH CV ECHO MEAS - ESV(MOD-SP4): 75 ML
BH CV ECHO MEAS - ESV(TEICH): 85.8 ML
BH CV ECHO MEAS - FS: 21.1 %
BH CV ECHO MEAS - IVS/LVPW: 1.1
BH CV ECHO MEAS - IVSD: 1.2 CM
BH CV ECHO MEAS - LA DIMENSION: 5.5 CM
BH CV ECHO MEAS - LA/AO: 1.7
BH CV ECHO MEAS - LV DIASTOLIC VOL/BSA (35-75): 67.3 ML/M^2
BH CV ECHO MEAS - LV MASS(C)D: 262 GRAMS
BH CV ECHO MEAS - LV MASS(C)DI: 108.9 GRAMS/M^2
BH CV ECHO MEAS - LV MAX PG: 4.6 MMHG
BH CV ECHO MEAS - LV MEAN PG: 2 MMHG
BH CV ECHO MEAS - LV SYSTOLIC VOL/BSA (12-30): 31.2 ML/M^2
BH CV ECHO MEAS - LV V1 MAX: 107 CM/SEC
BH CV ECHO MEAS - LV V1 MEAN: 63.8 CM/SEC
BH CV ECHO MEAS - LV V1 VTI: 17.7 CM
BH CV ECHO MEAS - LVIDD: 5.5 CM
BH CV ECHO MEAS - LVIDS: 4.4 CM
BH CV ECHO MEAS - LVLD AP4: 9.2 CM
BH CV ECHO MEAS - LVLS AP4: 8.5 CM
BH CV ECHO MEAS - LVOT AREA (M): 3.1 CM^2
BH CV ECHO MEAS - LVOT AREA: 3.1 CM^2
BH CV ECHO MEAS - LVOT DIAM: 2 CM
BH CV ECHO MEAS - LVPWD: 1.1 CM
BH CV ECHO MEAS - MV A MAX VEL: 32.8 CM/SEC
BH CV ECHO MEAS - MV DEC TIME: 0.17 SEC
BH CV ECHO MEAS - MV E MAX VEL: 125 CM/SEC
BH CV ECHO MEAS - MV E/A: 3.8
BH CV ECHO MEAS - PA MAX PG: 2.4 MMHG
BH CV ECHO MEAS - PA V2 MAX: 77.6 CM/SEC
BH CV ECHO MEAS - PI END-D VEL: 133 CM/SEC
BH CV ECHO MEAS - RAP SYSTOLE: 5 MMHG
BH CV ECHO MEAS - RVSP: 28.6 MMHG
BH CV ECHO MEAS - SI(AO): 73.2 ML/M^2
BH CV ECHO MEAS - SI(CUBED): 35.7 ML/M^2
BH CV ECHO MEAS - SI(LVOT): 23.1 ML/M^2
BH CV ECHO MEAS - SI(MOD-SP4): 36.2 ML/M^2
BH CV ECHO MEAS - SI(TEICH): 26.2 ML/M^2
BH CV ECHO MEAS - SV(AO): 176.1 ML
BH CV ECHO MEAS - SV(CUBED): 85.8 ML
BH CV ECHO MEAS - SV(LVOT): 55.6 ML
BH CV ECHO MEAS - SV(MOD-SP4): 87 ML
BH CV ECHO MEAS - SV(TEICH): 63.1 ML
BH CV ECHO MEAS - TR MAX VEL: 243 CM/SEC
E/E' RATIO: 22.1
LEFT ATRIUM VOLUME INDEX: 28.3 ML/M2
LEFT ATRIUM VOLUME: 68.3 CM3
MAXIMAL PREDICTED HEART RATE: 145 BPM
STRESS TARGET HR: 123 BPM

## 2018-02-12 ENCOUNTER — OFFICE VISIT (OUTPATIENT)
Dept: CARDIOLOGY | Facility: CLINIC | Age: 78
End: 2018-02-12

## 2018-02-12 VITALS
DIASTOLIC BLOOD PRESSURE: 50 MMHG | OXYGEN SATURATION: 96 % | SYSTOLIC BLOOD PRESSURE: 120 MMHG | HEIGHT: 73 IN | HEART RATE: 77 BPM | WEIGHT: 266 LBS | BODY MASS INDEX: 35.25 KG/M2

## 2018-02-12 DIAGNOSIS — I25.10 CORONARY ARTERY DISEASE INVOLVING NATIVE CORONARY ARTERY OF NATIVE HEART WITHOUT ANGINA PECTORIS: Primary | ICD-10-CM

## 2018-02-12 DIAGNOSIS — I10 ESSENTIAL HYPERTENSION: Chronic | ICD-10-CM

## 2018-02-12 DIAGNOSIS — I48.20 CHRONIC ATRIAL FIBRILLATION (HCC): Chronic | ICD-10-CM

## 2018-02-12 DIAGNOSIS — E78.2 MIXED HYPERLIPIDEMIA: Chronic | ICD-10-CM

## 2018-02-12 PROCEDURE — 93000 ELECTROCARDIOGRAM COMPLETE: CPT | Performed by: INTERNAL MEDICINE

## 2018-02-12 PROCEDURE — 99214 OFFICE O/P EST MOD 30 MIN: CPT | Performed by: INTERNAL MEDICINE

## 2018-02-12 NOTE — PROGRESS NOTES
Subjective:     Encounter Date:02/12/2018      Patient ID: Ha Agrawal is a 77 y.o. male with chronic atrial fibrillation, coronary artery disease, status post previous coronary artery bypass grafting, hypertension, hyperlipidemia, type II diabetes mellitus who presents today for follow-up.    Chief Complaint: Routine follow-up    Coronary Artery Disease   Presents for follow-up visit. Symptoms include shortness of breath. Pertinent negatives include no chest pain, chest pressure, chest tightness, dizziness, leg swelling, muscle weakness, palpitations or weight gain. Compliance with diet is variable. Compliance with exercise is variable. Compliance with medications is good.   Atrial Fibrillation   Presents for follow-up visit. Symptoms include shortness of breath. Symptoms are negative for chest pain, dizziness, hemodynamic instability, hypotension, palpitations, syncope, tachycardia and weakness. The symptoms have been stable. Past medical history includes atrial fibrillation and CAD. There are no medication compliance problems.     The patient presents today for routine follow-up.  He was hospitalized at the end of 2017 with chest discomfort and underwent a stress test at that time which was low risk for ischemia.  The patient followed up in our clinic on 1/11/18, and at that time claimed to be doing well.  His isosorbide mononitrate was increased to 60 mg daily.  The patient says that since that time, he has done very well.  He continues to have what he describes as mild shortness of breath and dyspnea on exertion - and this may have been improved slightly with the increase in isosorbide mononitrate.  The patient denies any recurrent chest discomfort.  The patient's exercise tolerance is good at this time, according to his report.  He has not had any trouble with his medications.  He denies lightheadedness, dizziness, syncope, orthopnea, PND, edema.  In particular, the patient has not had any trouble with  his anticoagulation.  The patient states that his blood pressure has been well-controlled.  He says that his diabetes and cholesterol are both followed by his primary care physician.  He reports good control of both problems.  Overall, the patient says that he is stable at this time and doing quite well.      Current Outpatient Prescriptions:   •  amiodarone (PACERONE) 200 MG tablet, Take 200 mg by mouth Daily., Disp: , Rfl:   •  atorvastatin (LIPITOR) 20 MG tablet, TAKE 1 TABLET BY MOUTH AT BEDTIME, Disp: 30 tablet, Rfl: 6  •  diazepam (VALIUM) 5 MG tablet, Take 5 mg by mouth daily as needed for anxiety., Disp: , Rfl:   •  fenofibrate (TRICOR) 145 MG tablet, Take 145 mg by mouth daily., Disp: , Rfl:   •  furosemide (LASIX) 40 MG tablet, Take 40 mg by mouth daily., Disp: , Rfl:   •  insulin glargine (LANTUS) 100 UNIT/ML injection, Inject  under the skin daily., Disp: , Rfl:   •  insulin regular (NOVOLIN R) 100 UNIT/ML injection, Inject  under the skin daily., Disp: , Rfl:   •  isosorbide mononitrate (IMDUR) 60 MG 24 hr tablet, Take 1 tablet by mouth Daily., Disp: 90 tablet, Rfl: 3  •  Loratadine 10 MG capsule, Take 10 mg by mouth daily., Disp: , Rfl:   •  metoprolol succinate XL (TOPROL-XL) 25 MG 24 hr tablet, Take 75 mg by mouth daily., Disp: , Rfl:   •  Multiple Vitamins-Minerals (MULTIVITAMIN ADULT PO), Take  by mouth daily., Disp: , Rfl:   •  olmesartan (BENICAR) 20 MG tablet, Take 20 mg by mouth daily., Disp: , Rfl:   •  Omega-3 Fatty Acids (FISH OIL) 1200 MG capsule delayed-release, Take 1,200 mg by mouth 2 (two) times a day., Disp: , Rfl:   •  pantoprazole (PROTONIX) 40 MG EC tablet, Take 40 mg by mouth daily., Disp: , Rfl:   •  potassium chloride (K-DUR,KLOR-CON) 10 MEQ CR tablet, Take 10 mEq by mouth daily., Disp: , Rfl:   •  ranolazine (RANEXA) 500 MG 12 hr tablet, Take 1 tablet by mouth 2 (two) times a day., Disp: 60 tablet, Rfl: 11  •  rivaroxaban (XARELTO) 15 MG tablet, Take 15 mg by mouth Daily., Disp:  , Rfl:   •  sertraline (ZOLOFT) 25 MG tablet, Take 25 mg by mouth daily., Disp: , Rfl:   •  tamsulosin (FLOMAX) 0.4 MG capsule 24 hr capsule, Take 1 capsule by mouth daily., Disp: , Rfl:   •  vitamin D (ERGOCALCIFEROL) 25969 UNITS capsule capsule, Take 50,000 Units by mouth 1 (one) time per week., Disp: , Rfl:   •  vitamin E 1000 UNIT capsule, Take 1,000 Units by mouth daily., Disp: , Rfl:     Allergies   Allergen Reactions   • Adhesive Tape Dermatitis   • Penicillins      Social History   Substance Use Topics   • Smoking status: Former Smoker     Types: Pipe     Quit date: 2004   • Smokeless tobacco: Never Used      Comment: QUIT AT AGE 63   • Alcohol use Yes      Comment: Socially     Review of Systems   Constitution: Negative for chills, fever, weakness, night sweats, weight gain and weight loss.   HENT: Negative for congestion and sore throat.    Cardiovascular: Positive for dyspnea on exertion. Negative for chest pain, claudication, irregular heartbeat, leg swelling, orthopnea, palpitations, paroxysmal nocturnal dyspnea and syncope.   Respiratory: Positive for shortness of breath. Negative for chest tightness, cough, hemoptysis and wheezing.    Endocrine: Negative for cold intolerance, heat intolerance, polydipsia and polyuria.   Hematologic/Lymphatic: Negative for bleeding problem. Does not bruise/bleed easily.   Musculoskeletal: Negative for muscle weakness.   Gastrointestinal: Negative for abdominal pain, hematemesis, hematochezia, melena, nausea and vomiting.   Genitourinary: Negative for bladder incontinence, dysuria and hematuria.   Neurological: Negative for dizziness, headaches, loss of balance, numbness, paresthesias and seizures.       ECG 12 Lead  Date/Time: 2/12/2018 1:24 PM  Performed by: TYLOR MARISCAL  Authorized by: TYLOR MARISCAL   Comparison: compared with previous ECG from 12/30/2017  Similar to previous ECG  Rhythm: atrial fibrillation  Rate: normal  BPM: 78  Conduction:  "conduction normal  QRS axis: normal  Clinical impression: abnormal ECG and dysrhythmia - atrial  Comments: NSTT             Objective:     Physical Exam   Constitutional: He is oriented to person, place, and time. He appears well-developed and well-nourished. No distress.   HENT:   Head: Normocephalic and atraumatic.   Mouth/Throat: Oropharynx is clear and moist.   Eyes: EOM are normal. Pupils are equal, round, and reactive to light.   Neck: Normal range of motion. Neck supple. No JVD present. No thyromegaly present.   Cardiovascular: Normal rate, regular rhythm, S1 normal, S2 normal, normal heart sounds and intact distal pulses.  Exam reveals no gallop and no friction rub.    No murmur heard.  Pulmonary/Chest: Effort normal and breath sounds normal.   Abdominal: Soft. Bowel sounds are normal. He exhibits no distension. There is no tenderness.   Musculoskeletal: Normal range of motion. He exhibits no edema.   Neurological: He is alert and oriented to person, place, and time. No cranial nerve deficit.   Skin: Skin is warm and dry. No rash noted. No cyanosis or erythema. Nails show no clubbing.   Psychiatric: He has a normal mood and affect.   Vitals reviewed.    /50 (BP Location: Left arm, Patient Position: Sitting)  Pulse 77  Ht 185.4 cm (73\")  Wt 121 kg (266 lb)  SpO2 96%  BMI 35.09 kg/m2    Data/Lab Review:     Echocardiogram 1/11/2018:  · Left ventricular wall thickness is consistent with mild concentric hypertrophy.  · Left ventricular systolic function is normal. Estimated EF appears to be in the range of 56 - 60%.  · Left ventricular diastolic dysfunction.  · Trace tricuspid valve regurgitation is present. Estimated right ventricular systolic pressure from tricuspid regurgitation is normal (<35 mmHg).  · Mild pulmonic valve regurgitation is present.    Stress Echo 12/31/2018:  · Baseline ECG rhythm of atrial fibrillation noted. Non-specific ST-T wave changes noted.  · The patient reported no " symptoms during the stress test.  · There was no ST segment deviation noted during stress.  · Segment augmentation had a normal response to stress.  · Normal stress echo with no significant echocardiographic evidence for myocardial ischemia.      Assessment:          Diagnosis Plan   1. Coronary artery disease involving native coronary artery of native heart without angina pectoris     2. Chronic atrial fibrillation  ECG 12 Lead   3. Essential hypertension     4. Mixed hyperlipidemia            Plan:       1.  Coronary artery disease: Overall, this patient remains clinically stable.  He is not on aspirin as he is currently anticoagulated.  He remains on beta blocker and statin therapies and is tolerating all medications well.  His stress echocardiogram was reviewed by me once again today and was thought to be low risk.  No further workup is indicated at this time as the patient is thought to be clinically stable.    2.  Chronic atrial fibrillation: The patient remains in atrial fibrillation with heart rates well-controlled on his current medications.  He is tolerating anticoagulation well.  Continue current medical therapies.    3.  Essential hypertension: The patient's blood pressure remains under excellent control.  Continue current medications.    4.  Mixed hyperlipidemia: The patient remains on statin therapy.  His lipids are followed by his primary care physician and are reportedly well controlled.  The patient remains on statin therapy without any significant side effects.    Follow-up: 6 months unless needed sooner.    EMR Dragon/Transcription disclaimer: Much of this encounter note is an electronic transcription/translation of spoken language to printed text. The electronic translation of spoken language may permit erroneous, or at times, nonsensical words or phrases to be inadvertently transcribed; although I have reviewed the note for such errors, some may still exist.

## 2018-08-13 ENCOUNTER — OFFICE VISIT (OUTPATIENT)
Dept: CARDIOLOGY | Facility: CLINIC | Age: 78
End: 2018-08-13

## 2018-08-13 VITALS
WEIGHT: 261 LBS | BODY MASS INDEX: 34.59 KG/M2 | SYSTOLIC BLOOD PRESSURE: 102 MMHG | HEART RATE: 60 BPM | DIASTOLIC BLOOD PRESSURE: 50 MMHG | OXYGEN SATURATION: 95 % | HEIGHT: 73 IN

## 2018-08-13 DIAGNOSIS — I10 ESSENTIAL HYPERTENSION: Chronic | ICD-10-CM

## 2018-08-13 DIAGNOSIS — I50.32 CHRONIC DIASTOLIC CONGESTIVE HEART FAILURE (HCC): ICD-10-CM

## 2018-08-13 DIAGNOSIS — I48.20 CHRONIC ATRIAL FIBRILLATION (HCC): Primary | Chronic | ICD-10-CM

## 2018-08-13 DIAGNOSIS — I25.10 CORONARY ARTERY DISEASE INVOLVING NATIVE CORONARY ARTERY OF NATIVE HEART WITHOUT ANGINA PECTORIS: ICD-10-CM

## 2018-08-13 PROCEDURE — 93000 ELECTROCARDIOGRAM COMPLETE: CPT | Performed by: INTERNAL MEDICINE

## 2018-08-13 PROCEDURE — 99214 OFFICE O/P EST MOD 30 MIN: CPT | Performed by: INTERNAL MEDICINE

## 2018-08-13 NOTE — PROGRESS NOTES
Subjective:     Encounter Date:08/13/2018      Patient ID: Ha Agrawal is a 78 y.o. male with coronary artery disease, status post coronary artery bypass grafting, stent placement, hypertension, hyperlipidemia, chronic atrial fibrillation who presents today for routine follow-up.    Chief Complaint: Follow-up    Coronary Artery Disease   Presents for follow-up visit. Symptoms include shortness of breath. Pertinent negatives include no chest pain, chest pressure, chest tightness, dizziness, leg swelling, muscle weakness, palpitations or weight gain. The symptoms have been stable. Compliance with diet is variable. Compliance with exercise is variable. Compliance with medications is good.   Atrial Fibrillation   Presents for follow-up visit. Symptoms include shortness of breath. Symptoms are negative for chest pain, dizziness, hemodynamic instability, palpitations, syncope and weakness. The symptoms have been stable. Past medical history includes atrial fibrillation and CAD. There are no medication compliance problems.      This patient presents today for follow-up.  He has history of coronary artery disease.  He denies any recurrent chest pain.  The patient also has a history of atrial fibrillation and denies any symptoms related to this.  The patient does remain anticoagulated and is tolerating this well.  The patient still is experiencing shortness of breath and dyspnea on exertion which she says are mild but definitely noticeable.  His symptoms are unchanged in this regard.  He also notes chronic generalized fatigue that is unchanged.  The patient denies any changes in his weight, orthopnea, PND, edema.  He denies any palpitations, lightheadedness, dizziness, syncope.  His blood pressure has been well-controlled.  He has not had any trouble with his medications.  The patient did present to the hospital earlier this year with chest discomfort which has not recurred and he had a normal stress test at that  time.      Current Outpatient Prescriptions:   •  amiodarone (PACERONE) 200 MG tablet, Take 200 mg by mouth Daily., Disp: , Rfl:   •  atorvastatin (LIPITOR) 20 MG tablet, TAKE 1 TABLET BY MOUTH AT BEDTIME, Disp: 30 tablet, Rfl: 6  •  diazepam (VALIUM) 5 MG tablet, Take 5 mg by mouth daily as needed for anxiety., Disp: , Rfl:   •  fenofibrate (TRICOR) 145 MG tablet, Take 145 mg by mouth daily., Disp: , Rfl:   •  furosemide (LASIX) 40 MG tablet, Take 40 mg by mouth daily., Disp: , Rfl:   •  insulin glargine (LANTUS) 100 UNIT/ML injection, Inject  under the skin daily., Disp: , Rfl:   •  insulin regular (NOVOLIN R) 100 UNIT/ML injection, Inject  under the skin daily., Disp: , Rfl:   •  isosorbide mononitrate (IMDUR) 60 MG 24 hr tablet, Take 1 tablet by mouth Daily., Disp: 90 tablet, Rfl: 3  •  Loratadine 10 MG capsule, Take 10 mg by mouth daily., Disp: , Rfl:   •  metoprolol succinate XL (TOPROL-XL) 25 MG 24 hr tablet, Take 75 mg by mouth daily., Disp: , Rfl:   •  Multiple Vitamins-Minerals (MULTIVITAMIN ADULT PO), Take  by mouth daily., Disp: , Rfl:   •  olmesartan (BENICAR) 20 MG tablet, Take 20 mg by mouth daily., Disp: , Rfl:   •  Omega-3 Fatty Acids (FISH OIL) 1200 MG capsule delayed-release, Take 1,200 mg by mouth 2 (two) times a day., Disp: , Rfl:   •  pantoprazole (PROTONIX) 40 MG EC tablet, Take 40 mg by mouth daily., Disp: , Rfl:   •  potassium chloride (K-DUR,KLOR-CON) 10 MEQ CR tablet, Take 10 mEq by mouth daily., Disp: , Rfl:   •  ranolazine (RANEXA) 500 MG 12 hr tablet, Take 1 tablet by mouth 2 (two) times a day., Disp: 60 tablet, Rfl: 11  •  rivaroxaban (XARELTO) 15 MG tablet, Take 15 mg by mouth Daily., Disp: , Rfl:   •  sertraline (ZOLOFT) 25 MG tablet, Take 25 mg by mouth daily., Disp: , Rfl:   •  tamsulosin (FLOMAX) 0.4 MG capsule 24 hr capsule, Take 1 capsule by mouth daily., Disp: , Rfl:   •  vitamin D (ERGOCALCIFEROL) 42178 UNITS capsule capsule, Take 50,000 Units by mouth 1 (one) time per week.,  Disp: , Rfl:   •  vitamin E 1000 UNIT capsule, Take 1,000 Units by mouth daily., Disp: , Rfl:     Allergies   Allergen Reactions   • Adhesive Tape Dermatitis   • Penicillins Rash     Social History   Substance Use Topics   • Smoking status: Former Smoker     Types: Pipe     Quit date: 2004   • Smokeless tobacco: Never Used      Comment: QUIT AT AGE 63   • Alcohol use Yes      Comment: Socially     Review of Systems   Constitution: Positive for malaise/fatigue. Negative for chills, fever, weakness, night sweats, weight gain and weight loss.   HENT: Negative for congestion.    Cardiovascular: Positive for dyspnea on exertion. Negative for chest pain, claudication, irregular heartbeat, leg swelling, orthopnea, palpitations, paroxysmal nocturnal dyspnea and syncope.   Respiratory: Positive for shortness of breath. Negative for chest tightness, cough, hemoptysis and wheezing.    Endocrine: Negative for cold intolerance and heat intolerance.   Hematologic/Lymphatic: Negative for bleeding problem. Does not bruise/bleed easily.   Musculoskeletal: Negative for muscle weakness.   Gastrointestinal: Negative for abdominal pain, hematemesis, hematochezia, melena, nausea and vomiting.   Genitourinary: Negative for dysuria and hematuria.   Neurological: Negative for dizziness, headaches, loss of balance and numbness.         ECG 12 Lead  Date/Time: 8/13/2018 2:01 PM  Performed by: TYLOR MARISCAL  Authorized by: TYLOR MARISCAL   Comparison: compared with previous ECG from 2/12/2018  Similar to previous ECG  Rhythm: atrial fibrillation  Rate: normal  BPM: 83  Conduction: conduction normal  Clinical impression: abnormal ECG and dysrhythmia - atrial  Comments: NSTT               Objective:     Physical Exam   Constitutional: He is oriented to person, place, and time. He appears well-developed and well-nourished. No distress.   HENT:   Head: Normocephalic and atraumatic.   Mouth/Throat: Oropharynx is clear and moist.  "  Eyes: Pupils are equal, round, and reactive to light. EOM are normal.   Neck: Normal range of motion. Neck supple. No JVD present. No thyromegaly present.   Cardiovascular: Normal rate, S1 normal, S2 normal, normal heart sounds and intact distal pulses.  An irregularly irregular rhythm present. Exam reveals no gallop and no friction rub.    No murmur heard.  Pulmonary/Chest: Effort normal and breath sounds normal.   Abdominal: Soft. Bowel sounds are normal. He exhibits no distension. There is no tenderness.   Musculoskeletal: Normal range of motion. He exhibits no edema.   Neurological: He is alert and oriented to person, place, and time. No cranial nerve deficit.   Skin: Skin is warm and dry. No rash noted. No cyanosis or erythema. Nails show no clubbing.   Psychiatric: He has a normal mood and affect.   Vitals reviewed.    /50 (BP Location: Left arm, Patient Position: Sitting)   Pulse 60   Ht 185.4 cm (73\")   Wt 118 kg (261 lb)   SpO2 95%   BMI 34.43 kg/m²     Data/Lab Review:     Stress echo 12/31/17:  · Baseline ECG rhythm of atrial fibrillation noted. Non-specific ST-T wave changes noted.  · The patient reported no symptoms during the stress test.  · There was no ST segment deviation noted during stress.  · Segment augmentation had a normal response to stress.  · Normal stress echo with no significant echocardiographic evidence for myocardial ischemia.    Echocardiogram 1/19/18:  · Left ventricular wall thickness is consistent with mild concentric hypertrophy.  · Left ventricular systolic function is normal. Estimated EF appears to be in the range of 56 - 60%.  · Left ventricular diastolic dysfunction.  · Trace tricuspid valve regurgitation is present. Estimated right ventricular systolic pressure from tricuspid regurgitation is normal (<35 mmHg).  · Mild pulmonic valve regurgitation is present.      Assessment:          Diagnosis Plan   1. Chronic atrial fibrillation (CMS/HCC)  ECG 12 Lead   2. " Coronary artery disease involving native coronary artery of native heart without angina pectoris     3. Essential hypertension     4. Chronic diastolic congestive heart failure (CMS/[facility])            Plan:       1.  Coronary artery disease: Clinically stable with no ischemic symptoms.  The patient does remain on anticoagulation therefore is not on an aspirin at this time and he also remains on beta blocker, statin as well as isosorbide mononitrate and Ranexa therapies.     2.   chronic atrial fibrillation: Heart rate remains well-controlled on the patient is asymptomatic and chronically anticoagulated.     3.  Essential hypertension: No changes in medical therapy at this time as the patient's blood pressure does remain well-controlled.     4.   chronic diastolic congestive heart fear: The patient does experience some degree of chronic shortness of breath and dyspnea on exertion but remains euvolemic on examination today and according to his report minimally symptomatic.  No changes at this time.     Patient's Body mass index is 34.43 kg/m². BMI is above normal parameters. Recommendations include: exercise counseling and nutrition counseling.    Follow-up: 12 months unless needed sooner

## 2018-09-17 RX ORDER — ISOSORBIDE MONONITRATE 60 MG/1
TABLET, EXTENDED RELEASE ORAL
Qty: 90 TABLET | Refills: 3 | Status: ON HOLD | OUTPATIENT
Start: 2018-09-17 | End: 2019-01-01

## 2019-01-01 ENCOUNTER — READMISSION MANAGEMENT (OUTPATIENT)
Dept: CALL CENTER | Facility: HOSPITAL | Age: 79
End: 2019-01-01

## 2019-01-01 ENCOUNTER — APPOINTMENT (OUTPATIENT)
Dept: CARDIAC REHAB | Facility: HOSPITAL | Age: 79
End: 2019-01-01

## 2019-01-01 ENCOUNTER — HOSPITAL ENCOUNTER (OUTPATIENT)
Dept: SLEEP MEDICINE | Facility: HOSPITAL | Age: 79
Discharge: HOME | End: 2019-10-09
Admitting: NURSE PRACTITIONER

## 2019-01-01 ENCOUNTER — TREATMENT (OUTPATIENT)
Dept: CARDIAC REHAB | Facility: HOSPITAL | Age: 79
End: 2019-01-01

## 2019-01-01 ENCOUNTER — TRANSCRIBE ORDERS (OUTPATIENT)
Dept: SLEEP MEDICINE | Facility: HOSPITAL | Age: 79
End: 2019-01-01

## 2019-01-01 ENCOUNTER — OFFICE VISIT (OUTPATIENT)
Dept: PULMONOLOGY | Facility: CLINIC | Age: 79
End: 2019-01-01

## 2019-01-01 ENCOUNTER — APPOINTMENT (OUTPATIENT)
Dept: CARDIOLOGY | Facility: HOSPITAL | Age: 79
End: 2019-01-01

## 2019-01-01 ENCOUNTER — HOSPITAL ENCOUNTER (OUTPATIENT)
Dept: SLEEP MEDICINE | Facility: HOSPITAL | Age: 79
End: 2019-01-01

## 2019-01-01 ENCOUNTER — APPOINTMENT (OUTPATIENT)
Dept: CARDIOLOGY | Facility: HOSPITAL | Age: 79
End: 2019-01-01
Attending: INTERNAL MEDICINE

## 2019-01-01 ENCOUNTER — APPOINTMENT (OUTPATIENT)
Dept: CT IMAGING | Facility: HOSPITAL | Age: 79
End: 2019-01-01

## 2019-01-01 ENCOUNTER — TELEPHONE (OUTPATIENT)
Dept: CARDIOLOGY | Facility: CLINIC | Age: 79
End: 2019-01-01

## 2019-01-01 ENCOUNTER — HOSPITAL ENCOUNTER (OUTPATIENT)
Dept: GENERAL RADIOLOGY | Facility: HOSPITAL | Age: 79
Discharge: HOME | End: 2019-11-19
Admitting: NURSE PRACTITIONER

## 2019-01-01 ENCOUNTER — OFFICE VISIT (OUTPATIENT)
Dept: CARDIOLOGY | Facility: CLINIC | Age: 79
End: 2019-01-01

## 2019-01-01 ENCOUNTER — DOCUMENTATION (OUTPATIENT)
Dept: CARDIOLOGY | Facility: CLINIC | Age: 79
End: 2019-01-01

## 2019-01-01 ENCOUNTER — APPOINTMENT (OUTPATIENT)
Dept: GENERAL RADIOLOGY | Facility: HOSPITAL | Age: 79
End: 2019-01-01

## 2019-01-01 ENCOUNTER — HOSPITAL ENCOUNTER (EMERGENCY)
Facility: HOSPITAL | Age: 79
Discharge: HOME | End: 2019-08-13
Attending: EMERGENCY MEDICINE | Admitting: EMERGENCY MEDICINE

## 2019-01-01 ENCOUNTER — HOSPITAL ENCOUNTER (OUTPATIENT)
Dept: GENERAL RADIOLOGY | Facility: HOSPITAL | Age: 79
Discharge: HOME | End: 2019-07-19
Admitting: NURSE PRACTITIONER

## 2019-01-01 ENCOUNTER — HOSPITAL ENCOUNTER (OUTPATIENT)
Dept: PULMONOLOGY | Facility: HOSPITAL | Age: 79
Discharge: HOME | End: 2019-06-28
Admitting: NURSE PRACTITIONER

## 2019-01-01 ENCOUNTER — TELEPHONE (OUTPATIENT)
Dept: CARDIAC REHAB | Facility: HOSPITAL | Age: 79
End: 2019-01-01

## 2019-01-01 ENCOUNTER — OFFICE VISIT (OUTPATIENT)
Dept: GASTROENTEROLOGY | Facility: CLINIC | Age: 79
End: 2019-01-01

## 2019-01-01 ENCOUNTER — APPOINTMENT (OUTPATIENT)
Dept: LAB | Facility: HOSPITAL | Age: 79
End: 2019-01-01

## 2019-01-01 ENCOUNTER — TELEPHONE (OUTPATIENT)
Dept: GASTROENTEROLOGY | Facility: CLINIC | Age: 79
End: 2019-01-01

## 2019-01-01 ENCOUNTER — HOSPITAL ENCOUNTER (EMERGENCY)
Facility: HOSPITAL | Age: 79
Discharge: HOME | End: 2019-11-18
Attending: EMERGENCY MEDICINE | Admitting: EMERGENCY MEDICINE

## 2019-01-01 ENCOUNTER — HOSPITAL ENCOUNTER (OUTPATIENT)
Facility: HOSPITAL | Age: 79
Setting detail: OBSERVATION
Discharge: HOME | End: 2019-06-12
Attending: FAMILY MEDICINE | Admitting: INTERNAL MEDICINE

## 2019-01-01 ENCOUNTER — HOSPITAL ENCOUNTER (INPATIENT)
Facility: HOSPITAL | Age: 79
LOS: 2 days | Discharge: HOME | End: 2019-01-26
Attending: EMERGENCY MEDICINE | Admitting: INTERNAL MEDICINE

## 2019-01-01 ENCOUNTER — HOSPITAL ENCOUNTER (OUTPATIENT)
Facility: HOSPITAL | Age: 79
Setting detail: HOSPITAL OUTPATIENT SURGERY
End: 2019-01-01
Attending: INTERNAL MEDICINE | Admitting: INTERNAL MEDICINE

## 2019-01-01 VITALS
DIASTOLIC BLOOD PRESSURE: 58 MMHG | BODY MASS INDEX: 35.52 KG/M2 | WEIGHT: 268 LBS | HEIGHT: 73 IN | HEART RATE: 88 BPM | SYSTOLIC BLOOD PRESSURE: 112 MMHG | OXYGEN SATURATION: 96 %

## 2019-01-01 VITALS
HEIGHT: 73 IN | DIASTOLIC BLOOD PRESSURE: 62 MMHG | BODY MASS INDEX: 34.59 KG/M2 | SYSTOLIC BLOOD PRESSURE: 126 MMHG | OXYGEN SATURATION: 98 % | WEIGHT: 261 LBS | HEART RATE: 71 BPM

## 2019-01-01 VITALS
HEART RATE: 84 BPM | HEIGHT: 72 IN | DIASTOLIC BLOOD PRESSURE: 76 MMHG | WEIGHT: 261 LBS | BODY MASS INDEX: 35.35 KG/M2 | SYSTOLIC BLOOD PRESSURE: 125 MMHG | OXYGEN SATURATION: 97 % | RESPIRATION RATE: 16 BRPM

## 2019-01-01 VITALS
SYSTOLIC BLOOD PRESSURE: 126 MMHG | RESPIRATION RATE: 26 BRPM | DIASTOLIC BLOOD PRESSURE: 64 MMHG | HEART RATE: 81 BPM | WEIGHT: 265 LBS | BODY MASS INDEX: 35.12 KG/M2 | OXYGEN SATURATION: 95 % | TEMPERATURE: 97.6 F | HEIGHT: 73 IN

## 2019-01-01 VITALS
OXYGEN SATURATION: 98 % | BODY MASS INDEX: 35.49 KG/M2 | WEIGHT: 262 LBS | DIASTOLIC BLOOD PRESSURE: 60 MMHG | HEART RATE: 77 BPM | HEIGHT: 72 IN | SYSTOLIC BLOOD PRESSURE: 115 MMHG

## 2019-01-01 VITALS
DIASTOLIC BLOOD PRESSURE: 68 MMHG | OXYGEN SATURATION: 98 % | BODY MASS INDEX: 33.75 KG/M2 | HEART RATE: 70 BPM | WEIGHT: 263 LBS | HEIGHT: 74 IN | SYSTOLIC BLOOD PRESSURE: 112 MMHG

## 2019-01-01 VITALS
SYSTOLIC BLOOD PRESSURE: 136 MMHG | WEIGHT: 269 LBS | HEART RATE: 88 BPM | BODY MASS INDEX: 36.44 KG/M2 | OXYGEN SATURATION: 94 % | DIASTOLIC BLOOD PRESSURE: 70 MMHG | HEIGHT: 72 IN

## 2019-01-01 VITALS
HEIGHT: 73 IN | HEART RATE: 79 BPM | SYSTOLIC BLOOD PRESSURE: 130 MMHG | TEMPERATURE: 98 F | BODY MASS INDEX: 34.33 KG/M2 | DIASTOLIC BLOOD PRESSURE: 61 MMHG | RESPIRATION RATE: 16 BRPM | OXYGEN SATURATION: 95 % | WEIGHT: 259 LBS

## 2019-01-01 VITALS
HEIGHT: 74 IN | WEIGHT: 256.25 LBS | RESPIRATION RATE: 18 BRPM | BODY MASS INDEX: 32.89 KG/M2 | TEMPERATURE: 97.4 F | HEART RATE: 70 BPM | OXYGEN SATURATION: 95 % | SYSTOLIC BLOOD PRESSURE: 148 MMHG | DIASTOLIC BLOOD PRESSURE: 71 MMHG

## 2019-01-01 VITALS
SYSTOLIC BLOOD PRESSURE: 110 MMHG | DIASTOLIC BLOOD PRESSURE: 60 MMHG | OXYGEN SATURATION: 90 % | HEART RATE: 70 BPM | WEIGHT: 267 LBS | HEIGHT: 72 IN | BODY MASS INDEX: 36.16 KG/M2

## 2019-01-01 VITALS
WEIGHT: 268 LBS | RESPIRATION RATE: 18 BRPM | DIASTOLIC BLOOD PRESSURE: 60 MMHG | TEMPERATURE: 98 F | BODY MASS INDEX: 36.3 KG/M2 | OXYGEN SATURATION: 97 % | HEART RATE: 84 BPM | HEIGHT: 72 IN | SYSTOLIC BLOOD PRESSURE: 122 MMHG

## 2019-01-01 VITALS
BODY MASS INDEX: 36.03 KG/M2 | TEMPERATURE: 97 F | HEART RATE: 89 BPM | SYSTOLIC BLOOD PRESSURE: 130 MMHG | DIASTOLIC BLOOD PRESSURE: 68 MMHG | WEIGHT: 266 LBS | OXYGEN SATURATION: 98 % | HEIGHT: 72 IN

## 2019-01-01 DIAGNOSIS — I50.32 CHRONIC DIASTOLIC CONGESTIVE HEART FAILURE (HCC): Chronic | ICD-10-CM

## 2019-01-01 DIAGNOSIS — R19.5 HEME POSITIVE STOOL: ICD-10-CM

## 2019-01-01 DIAGNOSIS — Z95.5 S/P DRUG ELUTING CORONARY STENT PLACEMENT: Primary | ICD-10-CM

## 2019-01-01 DIAGNOSIS — R06.02 SHORTNESS OF BREATH: Primary | ICD-10-CM

## 2019-01-01 DIAGNOSIS — I10 ESSENTIAL HYPERTENSION: Chronic | ICD-10-CM

## 2019-01-01 DIAGNOSIS — E11.22 TYPE 2 DIABETES MELLITUS WITH STAGE 3 CHRONIC KIDNEY DISEASE, WITH LONG-TERM CURRENT USE OF INSULIN (HCC): Chronic | ICD-10-CM

## 2019-01-01 DIAGNOSIS — R07.9 CHEST PAIN, UNSPECIFIED TYPE: Primary | ICD-10-CM

## 2019-01-01 DIAGNOSIS — N20.0 KIDNEY STONE: ICD-10-CM

## 2019-01-01 DIAGNOSIS — Z95.1 HX OF CABG: ICD-10-CM

## 2019-01-01 DIAGNOSIS — J96.91 RESPIRATORY FAILURE WITH HYPOXIA, UNSPECIFIED CHRONICITY (HCC): ICD-10-CM

## 2019-01-01 DIAGNOSIS — Z87.891 PERSONAL HISTORY OF NICOTINE DEPENDENCE: ICD-10-CM

## 2019-01-01 DIAGNOSIS — D64.9 ANEMIA, UNSPECIFIED TYPE: ICD-10-CM

## 2019-01-01 DIAGNOSIS — N28.9 RENAL LESION: ICD-10-CM

## 2019-01-01 DIAGNOSIS — N20.0 KIDNEY STONE: Primary | ICD-10-CM

## 2019-01-01 DIAGNOSIS — N28.9 ACUTE ON CHRONIC RENAL INSUFFICIENCY: ICD-10-CM

## 2019-01-01 DIAGNOSIS — J96.11 CHRONIC RESPIRATORY FAILURE WITH HYPOXIA (HCC): ICD-10-CM

## 2019-01-01 DIAGNOSIS — Z79.4 TYPE 2 DIABETES MELLITUS WITH STAGE 3 CHRONIC KIDNEY DISEASE, WITH LONG-TERM CURRENT USE OF INSULIN (HCC): Chronic | ICD-10-CM

## 2019-01-01 DIAGNOSIS — Z79.01 ANTICOAGULATED: ICD-10-CM

## 2019-01-01 DIAGNOSIS — E78.2 MIXED HYPERLIPIDEMIA: Chronic | ICD-10-CM

## 2019-01-01 DIAGNOSIS — Z95.5 HISTORY OF CORONARY ARTERY STENT PLACEMENT: Chronic | ICD-10-CM

## 2019-01-01 DIAGNOSIS — R31.29 OTHER MICROSCOPIC HEMATURIA: ICD-10-CM

## 2019-01-01 DIAGNOSIS — I20.0 UNSTABLE ANGINA PECTORIS (HCC): Primary | ICD-10-CM

## 2019-01-01 DIAGNOSIS — G47.33 OBSTRUCTIVE SLEEP APNEA, ADULT: Primary | ICD-10-CM

## 2019-01-01 DIAGNOSIS — N18.30 TYPE 2 DIABETES MELLITUS WITH STAGE 3 CHRONIC KIDNEY DISEASE, WITH LONG-TERM CURRENT USE OF INSULIN (HCC): Chronic | ICD-10-CM

## 2019-01-01 DIAGNOSIS — I25.119 CORONARY ARTERY DISEASE INVOLVING NATIVE CORONARY ARTERY OF NATIVE HEART WITH ANGINA PECTORIS (HCC): ICD-10-CM

## 2019-01-01 DIAGNOSIS — N28.1 RENAL CYST: ICD-10-CM

## 2019-01-01 DIAGNOSIS — D50.9 IRON DEFICIENCY ANEMIA, UNSPECIFIED IRON DEFICIENCY ANEMIA TYPE: ICD-10-CM

## 2019-01-01 DIAGNOSIS — I25.118 CORONARY ARTERY DISEASE OF NATIVE ARTERY OF NATIVE HEART WITH STABLE ANGINA PECTORIS (HCC): Primary | ICD-10-CM

## 2019-01-01 DIAGNOSIS — R06.02 SHORTNESS OF BREATH: ICD-10-CM

## 2019-01-01 DIAGNOSIS — J96.01 ACUTE RESPIRATORY FAILURE WITH HYPOXIA (HCC): ICD-10-CM

## 2019-01-01 DIAGNOSIS — N20.1 URETERIC CALCULUS: Primary | ICD-10-CM

## 2019-01-01 DIAGNOSIS — D50.8 OTHER IRON DEFICIENCY ANEMIA: Primary | ICD-10-CM

## 2019-01-01 DIAGNOSIS — Z79.01 CHRONIC ANTICOAGULATION: Chronic | ICD-10-CM

## 2019-01-01 DIAGNOSIS — I48.20 CHRONIC ATRIAL FIBRILLATION (HCC): Chronic | ICD-10-CM

## 2019-01-01 DIAGNOSIS — R19.5 HEME POSITIVE STOOL: Primary | ICD-10-CM

## 2019-01-01 DIAGNOSIS — K92.1 BLOOD IN STOOL: ICD-10-CM

## 2019-01-01 DIAGNOSIS — I25.119 CORONARY ARTERY DISEASE INVOLVING NATIVE CORONARY ARTERY OF NATIVE HEART WITH ANGINA PECTORIS (HCC): Primary | ICD-10-CM

## 2019-01-01 DIAGNOSIS — G47.33 OBSTRUCTIVE SLEEP APNEA: ICD-10-CM

## 2019-01-01 DIAGNOSIS — R79.89 ELEVATED TSH: ICD-10-CM

## 2019-01-01 DIAGNOSIS — K80.20 CALCULUS OF GALLBLADDER WITHOUT CHOLECYSTITIS WITHOUT OBSTRUCTION: ICD-10-CM

## 2019-01-01 DIAGNOSIS — G47.10 HYPERSOMNIA: ICD-10-CM

## 2019-01-01 DIAGNOSIS — N13.30 HYDRONEPHROSIS, UNSPECIFIED HYDRONEPHROSIS TYPE: ICD-10-CM

## 2019-01-01 DIAGNOSIS — N18.9 CHRONIC KIDNEY DISEASE, UNSPECIFIED CKD STAGE: ICD-10-CM

## 2019-01-01 DIAGNOSIS — R94.6 ABNORMAL RESULTS OF THYROID FUNCTION STUDIES: ICD-10-CM

## 2019-01-01 DIAGNOSIS — R19.4 CHANGE IN BOWEL HABIT: ICD-10-CM

## 2019-01-01 DIAGNOSIS — N18.30 STAGE 3 CHRONIC KIDNEY DISEASE (HCC): Chronic | ICD-10-CM

## 2019-01-01 DIAGNOSIS — N18.9 ACUTE ON CHRONIC RENAL INSUFFICIENCY: ICD-10-CM

## 2019-01-01 DIAGNOSIS — I50.9 CONGESTIVE HEART FAILURE, UNSPECIFIED HF CHRONICITY, UNSPECIFIED HEART FAILURE TYPE (HCC): ICD-10-CM

## 2019-01-01 DIAGNOSIS — R06.00 DYSPNEA, UNSPECIFIED TYPE: Primary | ICD-10-CM

## 2019-01-01 LAB
ACT BLD: 202 SECONDS (ref 82–152)
ACT BLD: 208 SECONDS (ref 82–152)
ALBUMIN SERPL-MCNC: 3.9 G/DL (ref 3.5–5)
ALBUMIN SERPL-MCNC: 4.2 G/DL (ref 3.5–5)
ALBUMIN SERPL-MCNC: 4.2 G/DL (ref 3.5–5.2)
ALBUMIN SERPL-MCNC: 4.3 G/DL (ref 3.5–5)
ALBUMIN SERPL-MCNC: 4.4 G/DL (ref 3.5–5)
ALBUMIN/GLOB SERPL: 1.1 G/DL
ALBUMIN/GLOB SERPL: 1.2 G/DL (ref 1.1–2.5)
ALBUMIN/GLOB SERPL: 1.3 G/DL (ref 1.1–2.5)
ALP SERPL-CCNC: 55 U/L (ref 24–120)
ALP SERPL-CCNC: 57 U/L (ref 24–120)
ALP SERPL-CCNC: 58 U/L (ref 39–117)
ALP SERPL-CCNC: 65 U/L (ref 24–120)
ALP SERPL-CCNC: 79 U/L (ref 24–120)
ALT SERPL W P-5'-P-CCNC: 15 U/L (ref 1–41)
ALT SERPL W P-5'-P-CCNC: 16 U/L (ref 0–54)
ALT SERPL W P-5'-P-CCNC: 17 U/L (ref 0–54)
ALT SERPL W P-5'-P-CCNC: 21 U/L (ref 0–54)
ALT SERPL W P-5'-P-CCNC: 21 U/L (ref 0–54)
ANION GAP SERPL CALCULATED.3IONS-SCNC: 10 MMOL/L (ref 4–13)
ANION GAP SERPL CALCULATED.3IONS-SCNC: 10 MMOL/L (ref 5–15)
ANION GAP SERPL CALCULATED.3IONS-SCNC: 11 MMOL/L (ref 4–13)
ANION GAP SERPL CALCULATED.3IONS-SCNC: 12 MMOL/L (ref 4–13)
ANION GAP SERPL CALCULATED.3IONS-SCNC: 13 MMOL/L (ref 4–13)
ANION GAP SERPL CALCULATED.3IONS-SCNC: 15 MMOL/L (ref 4–13)
ARTERIAL PATENCY WRIST A: POSITIVE
ARTICHOKE IGE QN: 61 MG/DL (ref 0–99)
AST SERPL-CCNC: 18 U/L (ref 7–45)
AST SERPL-CCNC: 30 U/L (ref 7–45)
AST SERPL-CCNC: 45 U/L (ref 1–40)
AST SERPL-CCNC: 47 U/L (ref 7–45)
AST SERPL-CCNC: 48 U/L (ref 7–45)
ATMOSPHERIC PRESS: 747 MMHG
ATMOSPHERIC PRESS: 755 MMHG
ATMOSPHERIC PRESS: 755 MMHG
BACTERIA UR QL AUTO: ABNORMAL /HPF
BASE EXCESS BLDA CALC-SCNC: -0.5 MMOL/L (ref 0–2)
BASE EXCESS BLDA CALC-SCNC: 0.6 MMOL/L (ref 0–2)
BASE EXCESS BLDA CALC-SCNC: 1 MMOL/L (ref 0–2)
BASOPHILS # BLD AUTO: 0 10*3/MM3 (ref 0–0.2)
BASOPHILS # BLD AUTO: 0.02 10*3/MM3 (ref 0–0.2)
BASOPHILS # BLD AUTO: 0.02 10*3/MM3 (ref 0–0.2)
BASOPHILS # BLD AUTO: 0.03 10*3/MM3 (ref 0–0.2)
BASOPHILS # BLD AUTO: 0.03 10*3/MM3 (ref 0–0.2)
BASOPHILS # BLD AUTO: 0.04 10*3/MM3 (ref 0–0.2)
BASOPHILS NFR BLD AUTO: 0 % (ref 0–2)
BASOPHILS NFR BLD AUTO: 0.4 % (ref 0–1.5)
BASOPHILS NFR BLD AUTO: 0.4 % (ref 0–2)
BASOPHILS NFR BLD AUTO: 0.4 % (ref 0–2)
BASOPHILS NFR BLD AUTO: 0.5 % (ref 0–1.5)
BASOPHILS NFR BLD AUTO: 0.5 % (ref 0–2)
BASOPHILS NFR BLD AUTO: 0.6 % (ref 0–2)
BASOPHILS NFR BLD AUTO: 0.8 % (ref 0–2)
BDY SITE: ABNORMAL
BH CV ECHO MEAS - AO MAX PG (FULL): -0.28 MMHG
BH CV ECHO MEAS - AO MAX PG (FULL): 5.2 MMHG
BH CV ECHO MEAS - AO MAX PG: 11.8 MMHG
BH CV ECHO MEAS - AO MAX PG: 5.3 MMHG
BH CV ECHO MEAS - AO MEAN PG (FULL): 0 MMHG
BH CV ECHO MEAS - AO MEAN PG (FULL): 1.2 MMHG
BH CV ECHO MEAS - AO MEAN PG: 3 MMHG
BH CV ECHO MEAS - AO MEAN PG: 5.2 MMHG
BH CV ECHO MEAS - AO ROOT AREA (BSA CORRECTED): 1.4
BH CV ECHO MEAS - AO ROOT AREA (BSA CORRECTED): 1.4
BH CV ECHO MEAS - AO ROOT AREA: 8.6 CM^2
BH CV ECHO MEAS - AO ROOT AREA: 9.1 CM^2
BH CV ECHO MEAS - AO ROOT DIAM: 3.3 CM
BH CV ECHO MEAS - AO ROOT DIAM: 3.4 CM
BH CV ECHO MEAS - AO V2 MAX: 115 CM/SEC
BH CV ECHO MEAS - AO V2 MAX: 172 CM/SEC
BH CV ECHO MEAS - AO V2 MEAN: 102 CM/SEC
BH CV ECHO MEAS - AO V2 MEAN: 78.4 CM/SEC
BH CV ECHO MEAS - AO V2 VTI: 21.2 CM
BH CV ECHO MEAS - AO V2 VTI: 28.4 CM
BH CV ECHO MEAS - AVA(I,A): 3.6 CM^2
BH CV ECHO MEAS - AVA(I,A): 3.7 CM^2
BH CV ECHO MEAS - AVA(I,D): 3.6 CM^2
BH CV ECHO MEAS - AVA(I,D): 3.7 CM^2
BH CV ECHO MEAS - AVA(V,A): 2.8 CM^2
BH CV ECHO MEAS - AVA(V,A): 3.2 CM^2
BH CV ECHO MEAS - AVA(V,D): 2.8 CM^2
BH CV ECHO MEAS - AVA(V,D): 3.2 CM^2
BH CV ECHO MEAS - BSA(HAYCOCK): 2.5 M^2
BH CV ECHO MEAS - BSA(HAYCOCK): 2.6 M^2
BH CV ECHO MEAS - BSA: 2.4 M^2
BH CV ECHO MEAS - BSA: 2.5 M^2
BH CV ECHO MEAS - BZI_BMI: 34 KILOGRAMS/M^2
BH CV ECHO MEAS - BZI_BMI: 34.5 KILOGRAMS/M^2
BH CV ECHO MEAS - BZI_METRIC_HEIGHT: 185.4 CM
BH CV ECHO MEAS - BZI_METRIC_HEIGHT: 188 CM
BH CV ECHO MEAS - BZI_METRIC_WEIGHT: 117 KG
BH CV ECHO MEAS - BZI_METRIC_WEIGHT: 122 KG
BH CV ECHO MEAS - EDV(CUBED): 139.8 ML
BH CV ECHO MEAS - EDV(CUBED): 156.6 ML
BH CV ECHO MEAS - EDV(MOD-SP4): 136 ML
BH CV ECHO MEAS - EDV(MOD-SP4): 144 ML
BH CV ECHO MEAS - EDV(TEICH): 128.9 ML
BH CV ECHO MEAS - EDV(TEICH): 140.7 ML
BH CV ECHO MEAS - EF(CUBED): 63.6 %
BH CV ECHO MEAS - EF(CUBED): 75 %
BH CV ECHO MEAS - EF(MOD-SP4): 66.7 %
BH CV ECHO MEAS - EF(MOD-SP4): 77.7 %
BH CV ECHO MEAS - EF(TEICH): 54.6 %
BH CV ECHO MEAS - EF(TEICH): 66.5 %
BH CV ECHO MEAS - ESV(CUBED): 35 ML
BH CV ECHO MEAS - ESV(CUBED): 57.1 ML
BH CV ECHO MEAS - ESV(MOD-SP4): 30.3 ML
BH CV ECHO MEAS - ESV(MOD-SP4): 48 ML
BH CV ECHO MEAS - ESV(TEICH): 43.2 ML
BH CV ECHO MEAS - ESV(TEICH): 63.9 ML
BH CV ECHO MEAS - FS: 28.6 %
BH CV ECHO MEAS - FS: 37 %
BH CV ECHO MEAS - IVS/LVPW: 0.98
BH CV ECHO MEAS - IVS/LVPW: 1
BH CV ECHO MEAS - IVSD: 1.1 CM
BH CV ECHO MEAS - IVSD: 1.1 CM
BH CV ECHO MEAS - LA DIMENSION: 5.2 CM
BH CV ECHO MEAS - LA DIMENSION: 5.5 CM
BH CV ECHO MEAS - LA/AO: 1.5
BH CV ECHO MEAS - LA/AO: 1.7
BH CV ECHO MEAS - LAT PEAK E' VEL: 5.6 CM/SEC
BH CV ECHO MEAS - LAT PEAK E' VEL: 7.5 CM/SEC
BH CV ECHO MEAS - LV DIASTOLIC VOL/BSA (35-75): 56.7 ML/M^2
BH CV ECHO MEAS - LV DIASTOLIC VOL/BSA (35-75): 58.4 ML/M^2
BH CV ECHO MEAS - LV MASS(C)D: 217.4 GRAMS
BH CV ECHO MEAS - LV MASS(C)D: 245.7 GRAMS
BH CV ECHO MEAS - LV MASS(C)DI: 90.6 GRAMS/M^2
BH CV ECHO MEAS - LV MASS(C)DI: 99.7 GRAMS/M^2
BH CV ECHO MEAS - LV MAX PG: 5.6 MMHG
BH CV ECHO MEAS - LV MAX PG: 6.7 MMHG
BH CV ECHO MEAS - LV MEAN PG: 3 MMHG
BH CV ECHO MEAS - LV MEAN PG: 4 MMHG
BH CV ECHO MEAS - LV SYSTOLIC VOL/BSA (12-30): 12.6 ML/M^2
BH CV ECHO MEAS - LV SYSTOLIC VOL/BSA (12-30): 19.5 ML/M^2
BH CV ECHO MEAS - LV V1 MAX: 118 CM/SEC
BH CV ECHO MEAS - LV V1 MAX: 128.8 CM/SEC
BH CV ECHO MEAS - LV V1 MEAN: 74 CM/SEC
BH CV ECHO MEAS - LV V1 MEAN: 94.5 CM/SEC
BH CV ECHO MEAS - LV V1 VTI: 24.7 CM
BH CV ECHO MEAS - LV V1 VTI: 27.2 CM
BH CV ECHO MEAS - LVIDD: 5.2 CM
BH CV ECHO MEAS - LVIDD: 5.4 CM
BH CV ECHO MEAS - LVIDS: 3.3 CM
BH CV ECHO MEAS - LVIDS: 3.9 CM
BH CV ECHO MEAS - LVLD AP4: 7.7 CM
BH CV ECHO MEAS - LVLD AP4: 8.4 CM
BH CV ECHO MEAS - LVLS AP4: 6.2 CM
BH CV ECHO MEAS - LVLS AP4: 6.6 CM
BH CV ECHO MEAS - LVOT AREA (M): 3.1 CM^2
BH CV ECHO MEAS - LVOT AREA (M): 3.8 CM^2
BH CV ECHO MEAS - LVOT AREA: 3.1 CM^2
BH CV ECHO MEAS - LVOT AREA: 3.8 CM^2
BH CV ECHO MEAS - LVOT DIAM: 2 CM
BH CV ECHO MEAS - LVOT DIAM: 2.2 CM
BH CV ECHO MEAS - LVPWD: 1.1 CM
BH CV ECHO MEAS - LVPWD: 1.1 CM
BH CV ECHO MEAS - MED PEAK E' VEL: 6.8 CM/SEC
BH CV ECHO MEAS - MED PEAK E' VEL: 7.07 CM/SEC
BH CV ECHO MEAS - MV DEC SLOPE: 626 CM/SEC^2
BH CV ECHO MEAS - MV DEC TIME: 0.15 SEC
BH CV ECHO MEAS - MV DEC TIME: 0.21 SEC
BH CV ECHO MEAS - MV E MAX VEL: 131 CM/SEC
BH CV ECHO MEAS - MV E MAX VEL: 137 CM/SEC
BH CV ECHO MEAS - MV P1/2T MAX VEL: 153 CM/SEC
BH CV ECHO MEAS - MV P1/2T: 71.6 MSEC
BH CV ECHO MEAS - MVA P1/2T LCG: 1.4 CM^2
BH CV ECHO MEAS - MVA(P1/2T): 3.1 CM^2
BH CV ECHO MEAS - PA MAX PG: 4.8 MMHG
BH CV ECHO MEAS - PA V2 MAX: 109 CM/SEC
BH CV ECHO MEAS - PI END-D VEL: 145 CM/SEC
BH CV ECHO MEAS - PI END-D VEL: 214 CM/SEC
BH CV ECHO MEAS - RAP SYSTOLE: 5 MMHG
BH CV ECHO MEAS - RVSP: 32.2 MMHG
BH CV ECHO MEAS - SI(AO): 101.2 ML/M^2
BH CV ECHO MEAS - SI(AO): 78.1 ML/M^2
BH CV ECHO MEAS - SI(CUBED): 40.4 ML/M^2
BH CV ECHO MEAS - SI(CUBED): 43.7 ML/M^2
BH CV ECHO MEAS - SI(LVOT): 31.5 ML/M^2
BH CV ECHO MEAS - SI(LVOT): 43.1 ML/M^2
BH CV ECHO MEAS - SI(MOD-SP4): 38.9 ML/M^2
BH CV ECHO MEAS - SI(MOD-SP4): 44.1 ML/M^2
BH CV ECHO MEAS - SI(TEICH): 31.2 ML/M^2
BH CV ECHO MEAS - SI(TEICH): 35.8 ML/M^2
BH CV ECHO MEAS - SV(AO): 192.5 ML
BH CV ECHO MEAS - SV(AO): 242.6 ML
BH CV ECHO MEAS - SV(CUBED): 104.8 ML
BH CV ECHO MEAS - SV(CUBED): 99.5 ML
BH CV ECHO MEAS - SV(LVOT): 103.4 ML
BH CV ECHO MEAS - SV(LVOT): 77.6 ML
BH CV ECHO MEAS - SV(MOD-SP4): 105.7 ML
BH CV ECHO MEAS - SV(MOD-SP4): 96 ML
BH CV ECHO MEAS - SV(TEICH): 76.8 ML
BH CV ECHO MEAS - SV(TEICH): 85.8 ML
BH CV ECHO MEAS - TR MAX VEL: 261 CM/SEC
BH CV ECHO MEASUREMENTS AVERAGE E/E' RATIO: 18.81
BH CV ECHO MEASUREMENTS AVERAGE E/E' RATIO: 21.13
BH CV STRESS BP STAGE 1: NORMAL
BH CV STRESS COMMENTS STAGE 1: NORMAL
BH CV STRESS DOSE REGADENOSON STAGE 1: 0.4
BH CV STRESS DURATION MIN STAGE 1: 0
BH CV STRESS DURATION SEC STAGE 1: 10
BH CV STRESS HR STAGE 1: 81
BH CV STRESS PROTOCOL 1: NORMAL
BH CV STRESS RECOVERY BP: NORMAL MMHG
BH CV STRESS RECOVERY HR: 75 BPM
BH CV STRESS STAGE 1: 1
BILIRUB SERPL-MCNC: 0.4 MG/DL (ref 0.2–1.2)
BILIRUB SERPL-MCNC: 0.5 MG/DL (ref 0.1–1)
BILIRUB SERPL-MCNC: 0.5 MG/DL (ref 0.1–1)
BILIRUB SERPL-MCNC: 0.6 MG/DL (ref 0.1–1)
BILIRUB SERPL-MCNC: 0.6 MG/DL (ref 0.1–1)
BILIRUB UR QL STRIP: NEGATIVE
BODY TEMPERATURE: 37 C
BUN BLD-MCNC: 18 MG/DL (ref 5–21)
BUN BLD-MCNC: 20 MG/DL (ref 5–21)
BUN BLD-MCNC: 22 MG/DL (ref 5–21)
BUN BLD-MCNC: 22 MG/DL (ref 5–21)
BUN BLD-MCNC: 23 MG/DL (ref 5–21)
BUN BLD-MCNC: 25 MG/DL (ref 5–21)
BUN BLD-MCNC: 26 MG/DL (ref 5–21)
BUN BLD-MCNC: 28 MG/DL (ref 5–21)
BUN BLD-MCNC: 30 MG/DL (ref 5–21)
BUN BLD-MCNC: 34 MG/DL (ref 8–23)
BUN/CREAT SERPL: 11.3 (ref 7–25)
BUN/CREAT SERPL: 11.9 (ref 7–25)
BUN/CREAT SERPL: 15.1 (ref 7–25)
BUN/CREAT SERPL: 15.4 (ref 7–25)
BUN/CREAT SERPL: 15.6 (ref 7–25)
BUN/CREAT SERPL: 15.9 (ref 7–25)
BUN/CREAT SERPL: 16 (ref 7–25)
BUN/CREAT SERPL: 16.3 (ref 7–25)
BUN/CREAT SERPL: 17.4 (ref 7–25)
BUN/CREAT SERPL: 18.2 (ref 7–25)
CALCIUM SPEC-SCNC: 10 MG/DL (ref 8.4–10.4)
CALCIUM SPEC-SCNC: 10.2 MG/DL (ref 8.4–10.4)
CALCIUM SPEC-SCNC: 9.2 MG/DL (ref 8.6–10.5)
CALCIUM SPEC-SCNC: 9.6 MG/DL (ref 8.4–10.4)
CALCIUM SPEC-SCNC: 9.6 MG/DL (ref 8.4–10.4)
CALCIUM SPEC-SCNC: 9.7 MG/DL (ref 8.4–10.4)
CALCIUM SPEC-SCNC: 9.7 MG/DL (ref 8.4–10.4)
CALCIUM SPEC-SCNC: 9.8 MG/DL (ref 8.4–10.4)
CALCIUM SPEC-SCNC: 9.8 MG/DL (ref 8.4–10.4)
CALCIUM SPEC-SCNC: 9.9 MG/DL (ref 8.4–10.4)
CHLORIDE SERPL-SCNC: 100 MMOL/L (ref 98–110)
CHLORIDE SERPL-SCNC: 101 MMOL/L (ref 98–110)
CHLORIDE SERPL-SCNC: 102 MMOL/L (ref 98–110)
CHLORIDE SERPL-SCNC: 102 MMOL/L (ref 98–110)
CHLORIDE SERPL-SCNC: 103 MMOL/L (ref 98–110)
CHLORIDE SERPL-SCNC: 103 MMOL/L (ref 98–110)
CHLORIDE SERPL-SCNC: 104 MMOL/L (ref 98–110)
CHLORIDE SERPL-SCNC: 105 MMOL/L (ref 98–110)
CHLORIDE SERPL-SCNC: 106 MMOL/L (ref 98–110)
CHLORIDE SERPL-SCNC: 96 MMOL/L (ref 98–107)
CHOLEST SERPL-MCNC: 120 MG/DL (ref 130–200)
CLARITY UR: ABNORMAL
CO2 SERPL-SCNC: 24 MMOL/L (ref 24–31)
CO2 SERPL-SCNC: 25 MMOL/L (ref 24–31)
CO2 SERPL-SCNC: 25 MMOL/L (ref 24–31)
CO2 SERPL-SCNC: 26 MMOL/L (ref 24–31)
CO2 SERPL-SCNC: 27 MMOL/L (ref 22–29)
CO2 SERPL-SCNC: 28 MMOL/L (ref 24–31)
CO2 SERPL-SCNC: 30 MMOL/L (ref 24–31)
COLOR UR: YELLOW
CREAT BLD-MCNC: 1.28 MG/DL (ref 0.5–1.4)
CREAT BLD-MCNC: 1.35 MG/DL (ref 0.5–1.4)
CREAT BLD-MCNC: 1.43 MG/DL (ref 0.5–1.4)
CREAT BLD-MCNC: 1.44 MG/DL (ref 0.5–1.4)
CREAT BLD-MCNC: 1.49 MG/DL (ref 0.5–1.4)
CREAT BLD-MCNC: 1.59 MG/DL (ref 0.5–1.4)
CREAT BLD-MCNC: 1.65 MG/DL (ref 0.5–1.4)
CREAT BLD-MCNC: 1.66 MG/DL (ref 0.5–1.4)
CREAT BLD-MCNC: 1.76 MG/DL (ref 0.5–1.4)
CREAT BLD-MCNC: 2.85 MG/DL (ref 0.76–1.27)
D DIMER PPP FEU-MCNC: 0.27 MG/L (FEU) (ref 0–0.5)
DEPRECATED RDW RBC AUTO: 52.7 FL (ref 40–54)
DEPRECATED RDW RBC AUTO: 53.7 FL (ref 40–54)
DEPRECATED RDW RBC AUTO: 53.7 FL (ref 40–54)
DEPRECATED RDW RBC AUTO: 54 FL (ref 40–54)
DEPRECATED RDW RBC AUTO: 55 FL (ref 40–54)
DEPRECATED RDW RBC AUTO: 55.5 FL (ref 40–54)
DEPRECATED RDW RBC AUTO: 56.6 FL (ref 40–54)
DEPRECATED RDW RBC AUTO: 57.2 FL (ref 37–54)
DEPRECATED RDW RBC AUTO: 57.9 FL (ref 40–54)
DEPRECATED RDW RBC AUTO: 59.7 FL (ref 40–54)
DEPRECATED RDW RBC AUTO: 60.3 FL (ref 37–54)
DEVELOPER EXPIRATION DATE: NORMAL
DEVELOPER LOT NUMBER: 151
EOSINOPHIL # BLD AUTO: 0.01 10*3/MM3 (ref 0–0.7)
EOSINOPHIL # BLD AUTO: 0.08 10*3/MM3 (ref 0–0.4)
EOSINOPHIL # BLD AUTO: 0.12 10*3/MM3 (ref 0–0.7)
EOSINOPHIL # BLD AUTO: 0.13 10*3/MM3 (ref 0–0.7)
EOSINOPHIL # BLD AUTO: 0.14 10*3/MM3 (ref 0–0.7)
EOSINOPHIL # BLD AUTO: 0.14 10*3/MM3 (ref 0–0.7)
EOSINOPHIL # BLD AUTO: 0.15 10*3/MM3 (ref 0–0.7)
EOSINOPHIL # BLD AUTO: 0.16 10*3/MM3 (ref 0–0.4)
EOSINOPHIL NFR BLD AUTO: 0.1 % (ref 0–4)
EOSINOPHIL NFR BLD AUTO: 1 % (ref 0.3–6.2)
EOSINOPHIL NFR BLD AUTO: 1.6 % (ref 0–4)
EOSINOPHIL NFR BLD AUTO: 2.2 % (ref 0–4)
EOSINOPHIL NFR BLD AUTO: 2.5 % (ref 0–4)
EOSINOPHIL NFR BLD AUTO: 2.6 % (ref 0–4)
EOSINOPHIL NFR BLD AUTO: 2.8 % (ref 0.3–6.2)
EOSINOPHIL NFR BLD AUTO: 2.8 % (ref 0–4)
ERYTHROCYTE [DISTWIDTH] IN BLOOD BY AUTOMATED COUNT: 14.5 % (ref 12–15)
ERYTHROCYTE [DISTWIDTH] IN BLOOD BY AUTOMATED COUNT: 14.7 % (ref 12–15)
ERYTHROCYTE [DISTWIDTH] IN BLOOD BY AUTOMATED COUNT: 15 % (ref 12–15)
ERYTHROCYTE [DISTWIDTH] IN BLOOD BY AUTOMATED COUNT: 15.2 % (ref 12–15)
ERYTHROCYTE [DISTWIDTH] IN BLOOD BY AUTOMATED COUNT: 15.2 % (ref 12–15)
ERYTHROCYTE [DISTWIDTH] IN BLOOD BY AUTOMATED COUNT: 15.5 % (ref 12–15)
ERYTHROCYTE [DISTWIDTH] IN BLOOD BY AUTOMATED COUNT: 15.5 % (ref 12–15)
ERYTHROCYTE [DISTWIDTH] IN BLOOD BY AUTOMATED COUNT: 16.2 % (ref 12–15)
ERYTHROCYTE [DISTWIDTH] IN BLOOD BY AUTOMATED COUNT: 16.5 % (ref 12–15)
ERYTHROCYTE [DISTWIDTH] IN BLOOD BY AUTOMATED COUNT: 16.7 % (ref 12.3–15.4)
ERYTHROCYTE [DISTWIDTH] IN BLOOD BY AUTOMATED COUNT: 18.5 % (ref 12.3–15.4)
EXPIRATION DATE: NORMAL
FECAL OCCULT BLOOD SCREEN, POC: NEGATIVE
FERRITIN SERPL-MCNC: 29.5 NG/ML (ref 17.9–464)
FOLATE SERPL-MCNC: 12.6 NG/ML
GAS FLOW AIRWAY: 2 LPM
GFR SERPL CREATININE-BSD FRML MDRD: 22 ML/MIN/1.73
GFR SERPL CREATININE-BSD FRML MDRD: 38 ML/MIN/1.73
GFR SERPL CREATININE-BSD FRML MDRD: 40 ML/MIN/1.73
GFR SERPL CREATININE-BSD FRML MDRD: 41 ML/MIN/1.73
GFR SERPL CREATININE-BSD FRML MDRD: 42 ML/MIN/1.73
GFR SERPL CREATININE-BSD FRML MDRD: 46 ML/MIN/1.73
GFR SERPL CREATININE-BSD FRML MDRD: 48 ML/MIN/1.73
GFR SERPL CREATININE-BSD FRML MDRD: 48 ML/MIN/1.73
GFR SERPL CREATININE-BSD FRML MDRD: 51 ML/MIN/1.73
GFR SERPL CREATININE-BSD FRML MDRD: 55 ML/MIN/1.73
GLOBULIN UR ELPH-MCNC: 3 GM/DL
GLOBULIN UR ELPH-MCNC: 3.3 GM/DL
GLOBULIN UR ELPH-MCNC: 3.4 GM/DL
GLOBULIN UR ELPH-MCNC: 3.6 GM/DL
GLOBULIN UR ELPH-MCNC: 3.8 GM/DL
GLUCOSE BLD-MCNC: 111 MG/DL (ref 70–100)
GLUCOSE BLD-MCNC: 117 MG/DL (ref 70–100)
GLUCOSE BLD-MCNC: 118 MG/DL (ref 70–100)
GLUCOSE BLD-MCNC: 119 MG/DL (ref 70–100)
GLUCOSE BLD-MCNC: 120 MG/DL (ref 65–99)
GLUCOSE BLD-MCNC: 120 MG/DL (ref 70–100)
GLUCOSE BLD-MCNC: 123 MG/DL (ref 70–100)
GLUCOSE BLD-MCNC: 137 MG/DL (ref 70–100)
GLUCOSE BLD-MCNC: 90 MG/DL (ref 70–100)
GLUCOSE BLD-MCNC: 91 MG/DL (ref 70–100)
GLUCOSE BLDC GLUCOMTR-MCNC: 102 MG/DL (ref 70–130)
GLUCOSE BLDC GLUCOMTR-MCNC: 108 MG/DL (ref 70–130)
GLUCOSE BLDC GLUCOMTR-MCNC: 114 MG/DL (ref 70–130)
GLUCOSE BLDC GLUCOMTR-MCNC: 117 MG/DL (ref 70–130)
GLUCOSE BLDC GLUCOMTR-MCNC: 122 MG/DL (ref 70–130)
GLUCOSE BLDC GLUCOMTR-MCNC: 126 MG/DL (ref 70–130)
GLUCOSE BLDC GLUCOMTR-MCNC: 129 MG/DL (ref 70–130)
GLUCOSE BLDC GLUCOMTR-MCNC: 132 MG/DL (ref 70–130)
GLUCOSE BLDC GLUCOMTR-MCNC: 137 MG/DL (ref 70–130)
GLUCOSE BLDC GLUCOMTR-MCNC: 137 MG/DL (ref 70–130)
GLUCOSE BLDC GLUCOMTR-MCNC: 138 MG/DL (ref 70–130)
GLUCOSE BLDC GLUCOMTR-MCNC: 149 MG/DL (ref 70–130)
GLUCOSE BLDC GLUCOMTR-MCNC: 153 MG/DL (ref 70–130)
GLUCOSE BLDC GLUCOMTR-MCNC: 157 MG/DL (ref 70–130)
GLUCOSE BLDC GLUCOMTR-MCNC: 199 MG/DL (ref 70–130)
GLUCOSE BLDC GLUCOMTR-MCNC: 91 MG/DL (ref 70–130)
GLUCOSE BLDC GLUCOMTR-MCNC: 93 MG/DL (ref 70–130)
GLUCOSE BLDC GLUCOMTR-MCNC: 96 MG/DL (ref 70–130)
GLUCOSE BLDC GLUCOMTR-MCNC: 96 MG/DL (ref 70–130)
GLUCOSE UR STRIP-MCNC: NEGATIVE MG/DL
HBA1C MFR BLD: 6.3 %
HCO3 BLDA-SCNC: 23.1 MMOL/L (ref 20–26)
HCO3 BLDA-SCNC: 24.4 MMOL/L (ref 20–26)
HCO3 BLDA-SCNC: 24.9 MMOL/L (ref 20–26)
HCT VFR BLD AUTO: 27.6 % (ref 40–52)
HCT VFR BLD AUTO: 28 % (ref 40–52)
HCT VFR BLD AUTO: 28.2 % (ref 40–52)
HCT VFR BLD AUTO: 28.4 % (ref 40–52)
HCT VFR BLD AUTO: 28.7 % (ref 40–52)
HCT VFR BLD AUTO: 28.9 % (ref 40–52)
HCT VFR BLD AUTO: 29.3 % (ref 40–52)
HCT VFR BLD AUTO: 29.7 % (ref 37.5–51)
HCT VFR BLD AUTO: 30.1 % (ref 40–52)
HCT VFR BLD AUTO: 31.9 % (ref 37.5–51)
HCT VFR BLD AUTO: 32.2 % (ref 40–52)
HCT VFR BLD AUTO: 33.2 % (ref 40–52)
HCT VFR BLD AUTO: 35.4 % (ref 40–52)
HCT VFR BLD AUTO: 36.9 % (ref 40–52)
HCT VFR BLD AUTO: 40.6 % (ref 40–52)
HDLC SERPL-MCNC: 36 MG/DL
HEMOCCULT STL QL: POSITIVE
HGB BLD-MCNC: 10.1 G/DL (ref 14–18)
HGB BLD-MCNC: 10.2 G/DL (ref 13–17.7)
HGB BLD-MCNC: 10.6 G/DL (ref 14–18)
HGB BLD-MCNC: 11.8 G/DL (ref 14–18)
HGB BLD-MCNC: 12.2 G/DL (ref 14–18)
HGB BLD-MCNC: 13.6 G/DL (ref 14–18)
HGB BLD-MCNC: 8.9 G/DL (ref 14–18)
HGB BLD-MCNC: 9.1 G/DL (ref 14–18)
HGB BLD-MCNC: 9.1 G/DL (ref 14–18)
HGB BLD-MCNC: 9.2 G/DL (ref 14–18)
HGB BLD-MCNC: 9.2 G/DL (ref 14–18)
HGB BLD-MCNC: 9.3 G/DL (ref 13–17.7)
HGB BLD-MCNC: 9.5 G/DL (ref 14–18)
HGB BLD-MCNC: 9.5 G/DL (ref 14–18)
HGB BLD-MCNC: 9.6 G/DL (ref 14–18)
HGB UR QL STRIP.AUTO: ABNORMAL
HOLD SPECIMEN: NORMAL
HOROWITZ INDEX BLD+IHG-RTO: 21 %
HYALINE CASTS UR QL AUTO: ABNORMAL /LPF
IMM GRANULOCYTES # BLD AUTO: 0.01 10*3/MM3 (ref 0–0.03)
IMM GRANULOCYTES # BLD AUTO: 0.01 10*3/MM3 (ref 0–0.05)
IMM GRANULOCYTES # BLD AUTO: 0.01 10*3/MM3 (ref 0–0.05)
IMM GRANULOCYTES # BLD AUTO: 0.02 10*3/MM3 (ref 0–0.05)
IMM GRANULOCYTES # BLD AUTO: 0.03 10*3/MM3 (ref 0–0.05)
IMM GRANULOCYTES # BLD AUTO: 0.04 10*3/MM3 (ref 0–0.05)
IMM GRANULOCYTES NFR BLD AUTO: 0.1 % (ref 0–5)
IMM GRANULOCYTES NFR BLD AUTO: 0.2 % (ref 0–0.5)
IMM GRANULOCYTES NFR BLD AUTO: 0.2 % (ref 0–5)
IMM GRANULOCYTES NFR BLD AUTO: 0.3 % (ref 0–0.5)
IMM GRANULOCYTES NFR BLD AUTO: 0.3 % (ref 0–5)
IMM GRANULOCYTES NFR BLD AUTO: 0.4 % (ref 0–5)
IMM GRANULOCYTES NFR BLD AUTO: 0.5 % (ref 0–5)
IMM GRANULOCYTES NFR BLD AUTO: 0.6 % (ref 0–5)
IRON 24H UR-MRATE: 45 MCG/DL (ref 42–180)
IRON SATN MFR SERPL: 10 % (ref 20–45)
KETONES UR QL STRIP: NEGATIVE
LDLC/HDLC SERPL: 1.58 {RATIO}
LEFT ATRIUM VOLUME INDEX: 31.8 ML/M2
LEFT ATRIUM VOLUME INDEX: 32.8 ML/M2
LEFT ATRIUM VOLUME: 78.7 CM3
LEUKOCYTE ESTERASE UR QL STRIP.AUTO: NEGATIVE
LV EF NUC BP: 67 %
LYMPHOCYTES # BLD AUTO: 0.9 10*3/MM3 (ref 0.72–4.86)
LYMPHOCYTES # BLD AUTO: 0.91 10*3/MM3 (ref 0.7–3.1)
LYMPHOCYTES # BLD AUTO: 0.97 10*3/MM3 (ref 0.72–4.86)
LYMPHOCYTES # BLD AUTO: 1 10*3/MM3 (ref 0.72–4.86)
LYMPHOCYTES # BLD AUTO: 1.03 10*3/MM3 (ref 0.7–3.1)
LYMPHOCYTES # BLD AUTO: 1.17 10*3/MM3 (ref 0.72–4.86)
LYMPHOCYTES # BLD AUTO: 1.22 10*3/MM3 (ref 0.72–4.86)
LYMPHOCYTES # BLD AUTO: 1.32 10*3/MM3 (ref 0.72–4.86)
LYMPHOCYTES NFR BLD AUTO: 10.7 % (ref 15–45)
LYMPHOCYTES NFR BLD AUTO: 11.4 % (ref 19.6–45.3)
LYMPHOCYTES NFR BLD AUTO: 13.3 % (ref 15–45)
LYMPHOCYTES NFR BLD AUTO: 17.9 % (ref 19.6–45.3)
LYMPHOCYTES NFR BLD AUTO: 18.7 % (ref 15–45)
LYMPHOCYTES NFR BLD AUTO: 18.7 % (ref 15–45)
LYMPHOCYTES NFR BLD AUTO: 23.2 % (ref 15–45)
LYMPHOCYTES NFR BLD AUTO: 24.4 % (ref 15–45)
Lab: 151
Lab: ABNORMAL
MAXIMAL PREDICTED HEART RATE: 143 BPM
MAXIMAL PREDICTED HEART RATE: 143 BPM
MAXIMAL PREDICTED HEART RATE: 144 BPM
MCH RBC QN AUTO: 28.2 PG (ref 26.6–33)
MCH RBC QN AUTO: 30.1 PG (ref 26.6–33)
MCH RBC QN AUTO: 31.1 PG (ref 28–32)
MCH RBC QN AUTO: 31.1 PG (ref 28–32)
MCH RBC QN AUTO: 31.2 PG (ref 28–32)
MCH RBC QN AUTO: 31.3 PG (ref 28–32)
MCH RBC QN AUTO: 31.7 PG (ref 28–32)
MCH RBC QN AUTO: 31.8 PG (ref 28–32)
MCH RBC QN AUTO: 33.3 PG (ref 28–32)
MCH RBC QN AUTO: 33.6 PG (ref 28–32)
MCH RBC QN AUTO: 33.7 PG (ref 28–32)
MCHC RBC AUTO-ENTMCNC: 31.3 G/DL (ref 31.5–35.7)
MCHC RBC AUTO-ENTMCNC: 31.4 G/DL (ref 33–36)
MCHC RBC AUTO-ENTMCNC: 31.9 G/DL (ref 33–36)
MCHC RBC AUTO-ENTMCNC: 31.9 G/DL (ref 33–36)
MCHC RBC AUTO-ENTMCNC: 32 G/DL (ref 31.5–35.7)
MCHC RBC AUTO-ENTMCNC: 32 G/DL (ref 33–36)
MCHC RBC AUTO-ENTMCNC: 32.1 G/DL (ref 33–36)
MCHC RBC AUTO-ENTMCNC: 32.9 G/DL (ref 33–36)
MCHC RBC AUTO-ENTMCNC: 33.1 G/DL (ref 33–36)
MCHC RBC AUTO-ENTMCNC: 33.3 G/DL (ref 33–36)
MCHC RBC AUTO-ENTMCNC: 33.5 G/DL (ref 33–36)
MCV RBC AUTO: 100 FL (ref 82–95)
MCV RBC AUTO: 100.7 FL (ref 82–95)
MCV RBC AUTO: 100.9 FL (ref 82–95)
MCV RBC AUTO: 101.7 FL (ref 82–95)
MCV RBC AUTO: 90 FL (ref 79–97)
MCV RBC AUTO: 94.1 FL (ref 79–97)
MCV RBC AUTO: 94.9 FL (ref 82–95)
MCV RBC AUTO: 96.9 FL (ref 82–95)
MCV RBC AUTO: 97.4 FL (ref 82–95)
MCV RBC AUTO: 97.6 FL (ref 82–95)
MCV RBC AUTO: 99.7 FL (ref 82–95)
MODALITY: ABNORMAL
MONOCYTES # BLD AUTO: 0.65 10*3/MM3 (ref 0.19–1.3)
MONOCYTES # BLD AUTO: 0.67 10*3/MM3 (ref 0.1–0.9)
MONOCYTES # BLD AUTO: 0.7 10*3/MM3 (ref 0.19–1.3)
MONOCYTES # BLD AUTO: 0.7 10*3/MM3 (ref 0.19–1.3)
MONOCYTES # BLD AUTO: 0.74 10*3/MM3 (ref 0.19–1.3)
MONOCYTES # BLD AUTO: 0.76 10*3/MM3 (ref 0.19–1.3)
MONOCYTES # BLD AUTO: 0.94 10*3/MM3 (ref 0.1–0.9)
MONOCYTES # BLD AUTO: 1.07 10*3/MM3 (ref 0.19–1.3)
MONOCYTES NFR BLD AUTO: 10.1 % (ref 4–12)
MONOCYTES NFR BLD AUTO: 11.2 % (ref 4–12)
MONOCYTES NFR BLD AUTO: 11.6 % (ref 5–12)
MONOCYTES NFR BLD AUTO: 11.8 % (ref 5–12)
MONOCYTES NFR BLD AUTO: 12.2 % (ref 4–12)
MONOCYTES NFR BLD AUTO: 12.7 % (ref 4–12)
MONOCYTES NFR BLD AUTO: 13.3 % (ref 4–12)
MONOCYTES NFR BLD AUTO: 14.1 % (ref 4–12)
NEGATIVE CONTROL: NEGATIVE
NEUTROPHILS # BLD AUTO: 3.14 10*3/MM3 (ref 1.87–8.4)
NEUTROPHILS # BLD AUTO: 3.14 10*3/MM3 (ref 1.87–8.4)
NEUTROPHILS # BLD AUTO: 3.51 10*3/MM3 (ref 1.87–8.4)
NEUTROPHILS # BLD AUTO: 3.87 10*3/MM3 (ref 1.7–7)
NEUTROPHILS # BLD AUTO: 4.2 10*3/MM3 (ref 1.87–8.4)
NEUTROPHILS # BLD AUTO: 5.43 10*3/MM3 (ref 1.87–8.4)
NEUTROPHILS # BLD AUTO: 6.02 10*3/MM3 (ref 1.7–7)
NEUTROPHILS # BLD AUTO: 6.42 10*3/MM3 (ref 1.87–8.4)
NEUTROPHILS NFR BLD AUTO: 58.1 % (ref 39–78)
NEUTROPHILS NFR BLD AUTO: 59.6 % (ref 39–78)
NEUTROPHILS NFR BLD AUTO: 65.7 % (ref 39–78)
NEUTROPHILS NFR BLD AUTO: 67 % (ref 39–78)
NEUTROPHILS NFR BLD AUTO: 67 % (ref 42.7–76)
NEUTROPHILS NFR BLD AUTO: 74.4 % (ref 39–78)
NEUTROPHILS NFR BLD AUTO: 75.1 % (ref 42.7–76)
NEUTROPHILS NFR BLD AUTO: 76 % (ref 39–78)
NITRITE UR QL STRIP: NEGATIVE
NRBC BLD AUTO-RTO: 0 /100 WBC (ref 0–0)
NRBC BLD AUTO-RTO: 0 /100 WBC (ref 0–0.2)
NT-PROBNP SERPL-MCNC: 1090 PG/ML (ref 0–1800)
NT-PROBNP SERPL-MCNC: 912 PG/ML (ref 0–1800)
NT-PROBNP SERPL-MCNC: 984 PG/ML (ref 0–1800)
PCO2 BLDA: 33.7 MM HG (ref 35–45)
PCO2 BLDA: 35.5 MM HG (ref 35–45)
PCO2 BLDA: 36.2 MM HG (ref 35–45)
PERCENT MAX PREDICTED HR: 56.64 %
PH BLDA: 7.45 PH UNITS (ref 7.35–7.45)
PH UR STRIP.AUTO: 8 [PH] (ref 5–8)
PHOSPHATE SERPL-MCNC: 4.1 MG/DL (ref 2.5–4.5)
PLATELET # BLD AUTO: 215 10*3/MM3 (ref 130–400)
PLATELET # BLD AUTO: 217 10*3/MM3 (ref 130–400)
PLATELET # BLD AUTO: 220 10*3/MM3 (ref 130–400)
PLATELET # BLD AUTO: 232 10*3/MM3 (ref 130–400)
PLATELET # BLD AUTO: 240 10*3/MM3 (ref 130–400)
PLATELET # BLD AUTO: 240 10*3/MM3 (ref 130–400)
PLATELET # BLD AUTO: 253 10*3/MM3 (ref 130–400)
PLATELET # BLD AUTO: 255 10*3/MM3 (ref 130–400)
PLATELET # BLD AUTO: 274 10*3/MM3 (ref 130–400)
PLATELET # BLD AUTO: 275 10*3/MM3 (ref 140–450)
PLATELET # BLD AUTO: 307 10*3/MM3 (ref 140–450)
PMV BLD AUTO: 10 FL (ref 6–12)
PMV BLD AUTO: 9.5 FL (ref 6–12)
PMV BLD AUTO: 9.5 FL (ref 6–12)
PMV BLD AUTO: 9.6 FL (ref 6–12)
PMV BLD AUTO: 9.6 FL (ref 6–12)
PMV BLD AUTO: 9.7 FL (ref 6–12)
PMV BLD AUTO: 9.7 FL (ref 6–12)
PMV BLD AUTO: 9.8 FL (ref 6–12)
PMV BLD AUTO: 9.9 FL (ref 6–12)
PO2 BLDA: 64.3 MM HG (ref 83–108)
PO2 BLDA: 80.7 MM HG (ref 83–108)
PO2 BLDA: 81.6 MM HG (ref 83–108)
POSITIVE CONTROL: POSITIVE
POTASSIUM BLD-SCNC: 3.9 MMOL/L (ref 3.5–5.3)
POTASSIUM BLD-SCNC: 3.9 MMOL/L (ref 3.5–5.3)
POTASSIUM BLD-SCNC: 4.2 MMOL/L (ref 3.5–5.3)
POTASSIUM BLD-SCNC: 4.2 MMOL/L (ref 3.5–5.3)
POTASSIUM BLD-SCNC: 4.3 MMOL/L (ref 3.5–5.3)
POTASSIUM BLD-SCNC: 4.4 MMOL/L (ref 3.5–5.3)
POTASSIUM BLD-SCNC: 5.5 MMOL/L (ref 3.5–5.2)
PROT SERPL-MCNC: 6.9 G/DL (ref 6.3–8.7)
PROT SERPL-MCNC: 7.5 G/DL (ref 6.3–8.7)
PROT SERPL-MCNC: 7.7 G/DL (ref 6.3–8.7)
PROT SERPL-MCNC: 8 G/DL (ref 6.3–8.7)
PROT SERPL-MCNC: 8 G/DL (ref 6–8.5)
PROT UR QL STRIP: ABNORMAL
RBC # BLD AUTO: 2.93 10*6/MM3 (ref 4.8–5.9)
RBC # BLD AUTO: 2.94 10*6/MM3 (ref 4.8–5.9)
RBC # BLD AUTO: 2.95 10*6/MM3 (ref 4.8–5.9)
RBC # BLD AUTO: 3.02 10*6/MM3 (ref 4.8–5.9)
RBC # BLD AUTO: 3.19 10*6/MM3 (ref 4.8–5.9)
RBC # BLD AUTO: 3.3 10*6/MM3 (ref 4.14–5.8)
RBC # BLD AUTO: 3.39 10*6/MM3 (ref 4.14–5.8)
RBC # BLD AUTO: 3.41 10*6/MM3 (ref 4.8–5.9)
RBC # BLD AUTO: 3.54 10*6/MM3 (ref 4.8–5.9)
RBC # BLD AUTO: 3.63 10*6/MM3 (ref 4.8–5.9)
RBC # BLD AUTO: 4.03 10*6/MM3 (ref 4.8–5.9)
RBC # UR: ABNORMAL /HPF
REF LAB TEST METHOD: ABNORMAL
SAO2 % BLDCOA: 91.4 % (ref 94–99)
SAO2 % BLDCOA: 95.2 % (ref 94–99)
SAO2 % BLDCOA: 96.8 % (ref 94–99)
SODIUM BLD-SCNC: 133 MMOL/L (ref 136–145)
SODIUM BLD-SCNC: 137 MMOL/L (ref 135–145)
SODIUM BLD-SCNC: 138 MMOL/L (ref 135–145)
SODIUM BLD-SCNC: 139 MMOL/L (ref 135–145)
SODIUM BLD-SCNC: 140 MMOL/L (ref 135–145)
SODIUM BLD-SCNC: 141 MMOL/L (ref 135–145)
SODIUM BLD-SCNC: 142 MMOL/L (ref 135–145)
SODIUM BLD-SCNC: 142 MMOL/L (ref 135–145)
SP GR UR STRIP: 1.02 (ref 1–1.03)
SQUAMOUS #/AREA URNS HPF: ABNORMAL /HPF
STRESS BASELINE BP: NORMAL MMHG
STRESS BASELINE HR: 69 BPM
STRESS PERCENT HR: 67 %
STRESS POST EXERCISE DUR SEC: 10 SEC
STRESS POST PEAK BP: NORMAL MMHG
STRESS POST PEAK HR: 81 BPM
STRESS TARGET HR: 122 BPM
T3FREE SERPL-MCNC: 2.2 PG/ML (ref 2–4.4)
T4 FREE SERPL-MCNC: 1 NG/DL (ref 0.78–2.19)
TIBC SERPL-MCNC: 446 MCG/DL (ref 225–420)
TRIGL SERPL-MCNC: 135 MG/DL (ref 0–149)
TROPONIN I SERPL-MCNC: 0.02 NG/ML (ref 0–0.03)
TROPONIN I SERPL-MCNC: <0.012 NG/ML (ref 0–0.03)
TSH SERPL DL<=0.005 MIU/L-ACNC: 6.24 UIU/ML (ref 0.45–4.5)
TSH SERPL DL<=0.05 MIU/L-ACNC: 10.8 MIU/ML (ref 0.47–4.68)
UROBILINOGEN UR QL STRIP: ABNORMAL
VENTILATOR MODE: ABNORMAL
VIT B12 BLD-MCNC: 352 PG/ML (ref 239–931)
WBC NRBC COR # BLD: 5.26 10*3/MM3 (ref 4.8–10.8)
WBC NRBC COR # BLD: 5.34 10*3/MM3 (ref 4.8–10.8)
WBC NRBC COR # BLD: 5.4 10*3/MM3 (ref 4.8–10.8)
WBC NRBC COR # BLD: 5.69 10*3/MM3 (ref 4.8–10.8)
WBC NRBC COR # BLD: 5.77 10*3/MM3 (ref 3.4–10.8)
WBC NRBC COR # BLD: 5.87 10*3/MM3 (ref 4.8–10.8)
WBC NRBC COR # BLD: 6.27 10*3/MM3 (ref 4.8–10.8)
WBC NRBC COR # BLD: 6.3 10*3/MM3 (ref 4.8–10.8)
WBC NRBC COR # BLD: 7.31 10*3/MM3 (ref 4.8–10.8)
WBC NRBC COR # BLD: 8 10*3/MM3 (ref 3.4–10.8)
WBC NRBC COR # BLD: 8.44 10*3/MM3 (ref 4.8–10.8)
WBC UR QL AUTO: ABNORMAL /HPF
WHOLE BLOOD HOLD SPECIMEN: NORMAL

## 2019-01-01 PROCEDURE — 94760 N-INVAS EAR/PLS OXIMETRY 1: CPT

## 2019-01-01 PROCEDURE — 36415 COLL VENOUS BLD VENIPUNCTURE: CPT | Performed by: NURSE PRACTITIONER

## 2019-01-01 PROCEDURE — 96374 THER/PROPH/DIAG INJ IV PUSH: CPT

## 2019-01-01 PROCEDURE — 82962 GLUCOSE BLOOD TEST: CPT

## 2019-01-01 PROCEDURE — C1760 CLOSURE DEV, VASC: HCPCS | Performed by: INTERNAL MEDICINE

## 2019-01-01 PROCEDURE — 84100 ASSAY OF PHOSPHORUS: CPT | Performed by: INTERNAL MEDICINE

## 2019-01-01 PROCEDURE — G0378 HOSPITAL OBSERVATION PER HR: HCPCS

## 2019-01-01 PROCEDURE — 25010000002 HYDROMORPHONE PER 4 MG: Performed by: EMERGENCY MEDICINE

## 2019-01-01 PROCEDURE — 93000 ELECTROCARDIOGRAM COMPLETE: CPT | Performed by: NURSE PRACTITIONER

## 2019-01-01 PROCEDURE — 93798 PHYS/QHP OP CAR RHAB W/ECG: CPT

## 2019-01-01 PROCEDURE — 94762 N-INVAS EAR/PLS OXIMTRY CONT: CPT

## 2019-01-01 PROCEDURE — C9606 PERC D-E COR REVASC W AMI S: HCPCS | Performed by: INTERNAL MEDICINE

## 2019-01-01 PROCEDURE — 85025 COMPLETE CBC W/AUTO DIFF WBC: CPT | Performed by: EMERGENCY MEDICINE

## 2019-01-01 PROCEDURE — 36600 WITHDRAWAL OF ARTERIAL BLOOD: CPT

## 2019-01-01 PROCEDURE — 80053 COMPREHEN METABOLIC PANEL: CPT | Performed by: FAMILY MEDICINE

## 2019-01-01 PROCEDURE — A9500 TC99M SESTAMIBI: HCPCS | Performed by: INTERNAL MEDICINE

## 2019-01-01 PROCEDURE — 94799 UNLISTED PULMONARY SVC/PX: CPT

## 2019-01-01 PROCEDURE — 81001 URINALYSIS AUTO W/SCOPE: CPT | Performed by: EMERGENCY MEDICINE

## 2019-01-01 PROCEDURE — 93306 TTE W/DOPPLER COMPLETE: CPT | Performed by: INTERNAL MEDICINE

## 2019-01-01 PROCEDURE — 71045 X-RAY EXAM CHEST 1 VIEW: CPT

## 2019-01-01 PROCEDURE — 82803 BLOOD GASES ANY COMBINATION: CPT

## 2019-01-01 PROCEDURE — 85025 COMPLETE CBC W/AUTO DIFF WBC: CPT | Performed by: FAMILY MEDICINE

## 2019-01-01 PROCEDURE — 85018 HEMOGLOBIN: CPT | Performed by: INTERNAL MEDICINE

## 2019-01-01 PROCEDURE — 94729 DIFFUSING CAPACITY: CPT | Performed by: INTERNAL MEDICINE

## 2019-01-01 PROCEDURE — 93010 ELECTROCARDIOGRAM REPORT: CPT | Performed by: INTERNAL MEDICINE

## 2019-01-01 PROCEDURE — 99284 EMERGENCY DEPT VISIT MOD MDM: CPT

## 2019-01-01 PROCEDURE — 25010000002 MIDAZOLAM PER 1 MG: Performed by: INTERNAL MEDICINE

## 2019-01-01 PROCEDURE — 83036 HEMOGLOBIN GLYCOSYLATED A1C: CPT | Performed by: INTERNAL MEDICINE

## 2019-01-01 PROCEDURE — 93000 ELECTROCARDIOGRAM COMPLETE: CPT | Performed by: INTERNAL MEDICINE

## 2019-01-01 PROCEDURE — 84484 ASSAY OF TROPONIN QUANT: CPT | Performed by: EMERGENCY MEDICINE

## 2019-01-01 PROCEDURE — 99214 OFFICE O/P EST MOD 30 MIN: CPT | Performed by: INTERNAL MEDICINE

## 2019-01-01 PROCEDURE — 25010000002 ONDANSETRON PER 1 MG: Performed by: EMERGENCY MEDICINE

## 2019-01-01 PROCEDURE — 94729 DIFFUSING CAPACITY: CPT

## 2019-01-01 PROCEDURE — 83880 ASSAY OF NATRIURETIC PEPTIDE: CPT | Performed by: EMERGENCY MEDICINE

## 2019-01-01 PROCEDURE — 36415 COLL VENOUS BLD VENIPUNCTURE: CPT | Performed by: FAMILY MEDICINE

## 2019-01-01 PROCEDURE — 85014 HEMATOCRIT: CPT | Performed by: INTERNAL MEDICINE

## 2019-01-01 PROCEDURE — 63710000001 INSULIN DETEMIR PER 5 UNITS: Performed by: FAMILY MEDICINE

## 2019-01-01 PROCEDURE — 74176 CT ABD & PELVIS W/O CONTRAST: CPT

## 2019-01-01 PROCEDURE — 25010000002 FUROSEMIDE PER 20 MG: Performed by: EMERGENCY MEDICINE

## 2019-01-01 PROCEDURE — 25010000002 FENTANYL CITRATE (PF) 100 MCG/2ML SOLUTION: Performed by: INTERNAL MEDICINE

## 2019-01-01 PROCEDURE — 99285 EMERGENCY DEPT VISIT HI MDM: CPT

## 2019-01-01 PROCEDURE — 63710000001 BARIUM SULFATE 700 MG TABLET: Performed by: NURSE PRACTITIONER

## 2019-01-01 PROCEDURE — C1887 CATHETER, GUIDING: HCPCS | Performed by: INTERNAL MEDICINE

## 2019-01-01 PROCEDURE — 85027 COMPLETE CBC AUTOMATED: CPT | Performed by: INTERNAL MEDICINE

## 2019-01-01 PROCEDURE — 25010000002 REGADENOSON 0.4 MG/5ML SOLUTION: Performed by: INTERNAL MEDICINE

## 2019-01-01 PROCEDURE — C1874 STENT, COATED/COV W/DEL SYS: HCPCS | Performed by: INTERNAL MEDICINE

## 2019-01-01 PROCEDURE — 85027 COMPLETE CBC AUTOMATED: CPT | Performed by: NURSE PRACTITIONER

## 2019-01-01 PROCEDURE — 93017 CV STRESS TEST TRACING ONLY: CPT

## 2019-01-01 PROCEDURE — 63710000001 BARIUM SULFATE 96 % RECONSTITUTED SUSPENSION: Performed by: NURSE PRACTITIONER

## 2019-01-01 PROCEDURE — 93018 CV STRESS TEST I&R ONLY: CPT | Performed by: INTERNAL MEDICINE

## 2019-01-01 PROCEDURE — 25010000002 FUROSEMIDE PER 20 MG: Performed by: NURSE PRACTITIONER

## 2019-01-01 PROCEDURE — C1769 GUIDE WIRE: HCPCS | Performed by: INTERNAL MEDICINE

## 2019-01-01 PROCEDURE — C1894 INTRO/SHEATH, NON-LASER: HCPCS | Performed by: INTERNAL MEDICINE

## 2019-01-01 PROCEDURE — 80048 BASIC METABOLIC PNL TOTAL CA: CPT | Performed by: NURSE PRACTITIONER

## 2019-01-01 PROCEDURE — 94664 DEMO&/EVAL PT USE INHALER: CPT | Performed by: NURSE PRACTITIONER

## 2019-01-01 PROCEDURE — 93306 TTE W/DOPPLER COMPLETE: CPT

## 2019-01-01 PROCEDURE — 93005 ELECTROCARDIOGRAM TRACING: CPT | Performed by: FAMILY MEDICINE

## 2019-01-01 PROCEDURE — B2131ZZ FLUOROSCOPY OF MULTIPLE CORONARY ARTERY BYPASS GRAFTS USING LOW OSMOLAR CONTRAST: ICD-10-PCS | Performed by: INTERNAL MEDICINE

## 2019-01-01 PROCEDURE — A9270 NON-COVERED ITEM OR SERVICE: HCPCS | Performed by: NURSE PRACTITIONER

## 2019-01-01 PROCEDURE — 94727 GAS DIL/WSHOT DETER LNG VOL: CPT

## 2019-01-01 PROCEDURE — 94060 EVALUATION OF WHEEZING: CPT

## 2019-01-01 PROCEDURE — 4A023N7 MEASUREMENT OF CARDIAC SAMPLING AND PRESSURE, LEFT HEART, PERCUTANEOUS APPROACH: ICD-10-PCS | Performed by: INTERNAL MEDICINE

## 2019-01-01 PROCEDURE — 96375 TX/PRO/DX INJ NEW DRUG ADDON: CPT

## 2019-01-01 PROCEDURE — 84484 ASSAY OF TROPONIN QUANT: CPT | Performed by: INTERNAL MEDICINE

## 2019-01-01 PROCEDURE — 84484 ASSAY OF TROPONIN QUANT: CPT | Performed by: FAMILY MEDICINE

## 2019-01-01 PROCEDURE — 85027 COMPLETE CBC AUTOMATED: CPT | Performed by: FAMILY MEDICINE

## 2019-01-01 PROCEDURE — 25010000002 HEPARIN (PORCINE) PER 1000 UNITS: Performed by: INTERNAL MEDICINE

## 2019-01-01 PROCEDURE — 83880 ASSAY OF NATRIURETIC PEPTIDE: CPT | Performed by: FAMILY MEDICINE

## 2019-01-01 PROCEDURE — 93005 ELECTROCARDIOGRAM TRACING: CPT | Performed by: EMERGENCY MEDICINE

## 2019-01-01 PROCEDURE — 82607 VITAMIN B-12: CPT | Performed by: NURSE PRACTITIONER

## 2019-01-01 PROCEDURE — 82746 ASSAY OF FOLIC ACID SERUM: CPT | Performed by: NURSE PRACTITIONER

## 2019-01-01 PROCEDURE — 85025 COMPLETE CBC W/AUTO DIFF WBC: CPT | Performed by: NURSE PRACTITIONER

## 2019-01-01 PROCEDURE — 78452 HT MUSCLE IMAGE SPECT MULT: CPT

## 2019-01-01 PROCEDURE — 92937 PRQ TRLUML REVSC CAB GRF 1: CPT | Performed by: INTERNAL MEDICINE

## 2019-01-01 PROCEDURE — C1884 EMBOLIZATION PROTECT SYST: HCPCS | Performed by: INTERNAL MEDICINE

## 2019-01-01 PROCEDURE — 99214 OFFICE O/P EST MOD 30 MIN: CPT | Performed by: NURSE PRACTITIONER

## 2019-01-01 PROCEDURE — 63710000001 INSULIN LISPRO (HUMAN) PER 5 UNITS: Performed by: INTERNAL MEDICINE

## 2019-01-01 PROCEDURE — 84481 FREE ASSAY (FT-3): CPT | Performed by: NURSE PRACTITIONER

## 2019-01-01 PROCEDURE — 95810 POLYSOM 6/> YRS 4/> PARAM: CPT

## 2019-01-01 PROCEDURE — 85347 COAGULATION TIME ACTIVATED: CPT

## 2019-01-01 PROCEDURE — 25010000002 IRON SUCROSE PER 1 MG: Performed by: NURSE PRACTITIONER

## 2019-01-01 PROCEDURE — 82270 OCCULT BLOOD FECES: CPT | Performed by: FAMILY MEDICINE

## 2019-01-01 PROCEDURE — 82272 OCCULT BLD FECES 1-3 TESTS: CPT | Performed by: INTERNAL MEDICINE

## 2019-01-01 PROCEDURE — 99232 SBSQ HOSP IP/OBS MODERATE 35: CPT | Performed by: NURSE PRACTITIONER

## 2019-01-01 PROCEDURE — 94727 GAS DIL/WSHOT DETER LNG VOL: CPT | Performed by: INTERNAL MEDICINE

## 2019-01-01 PROCEDURE — 99213 OFFICE O/P EST LOW 20 MIN: CPT | Performed by: NURSE PRACTITIONER

## 2019-01-01 PROCEDURE — 84443 ASSAY THYROID STIM HORMONE: CPT | Performed by: INTERNAL MEDICINE

## 2019-01-01 PROCEDURE — 99215 OFFICE O/P EST HI 40 MIN: CPT | Performed by: NURSE PRACTITIONER

## 2019-01-01 PROCEDURE — 99213 OFFICE O/P EST LOW 20 MIN: CPT | Performed by: INTERNAL MEDICINE

## 2019-01-01 PROCEDURE — 93005 ELECTROCARDIOGRAM TRACING: CPT

## 2019-01-01 PROCEDURE — 80053 COMPREHEN METABOLIC PANEL: CPT | Performed by: EMERGENCY MEDICINE

## 2019-01-01 PROCEDURE — 83540 ASSAY OF IRON: CPT | Performed by: NURSE PRACTITIONER

## 2019-01-01 PROCEDURE — 80048 BASIC METABOLIC PNL TOTAL CA: CPT | Performed by: FAMILY MEDICINE

## 2019-01-01 PROCEDURE — 94060 EVALUATION OF WHEEZING: CPT | Performed by: INTERNAL MEDICINE

## 2019-01-01 PROCEDURE — 85025 COMPLETE CBC W/AUTO DIFF WBC: CPT | Performed by: INTERNAL MEDICINE

## 2019-01-01 PROCEDURE — 0 TECHNETIUM SESTAMIBI: Performed by: INTERNAL MEDICINE

## 2019-01-01 PROCEDURE — 80053 COMPREHEN METABOLIC PANEL: CPT | Performed by: INTERNAL MEDICINE

## 2019-01-01 PROCEDURE — C1725 CATH, TRANSLUMIN NON-LASER: HCPCS | Performed by: INTERNAL MEDICINE

## 2019-01-01 PROCEDURE — 83550 IRON BINDING TEST: CPT | Performed by: NURSE PRACTITIONER

## 2019-01-01 PROCEDURE — 80061 LIPID PANEL: CPT | Performed by: INTERNAL MEDICINE

## 2019-01-01 PROCEDURE — 80048 BASIC METABOLIC PNL TOTAL CA: CPT | Performed by: INTERNAL MEDICINE

## 2019-01-01 PROCEDURE — 027034Z DILATION OF CORONARY ARTERY, ONE ARTERY WITH DRUG-ELUTING INTRALUMINAL DEVICE, PERCUTANEOUS APPROACH: ICD-10-PCS | Performed by: INTERNAL MEDICINE

## 2019-01-01 PROCEDURE — 93459 L HRT ART/GRFT ANGIO: CPT | Performed by: INTERNAL MEDICINE

## 2019-01-01 PROCEDURE — 25010000002 METHYLPREDNISOLONE PER 125 MG: Performed by: NURSE PRACTITIONER

## 2019-01-01 PROCEDURE — 74018 RADEX ABDOMEN 1 VIEW: CPT

## 2019-01-01 PROCEDURE — 36415 COLL VENOUS BLD VENIPUNCTURE: CPT | Performed by: EMERGENCY MEDICINE

## 2019-01-01 PROCEDURE — 63710000001 INSULIN DETEMIR PER 5 UNITS: Performed by: INTERNAL MEDICINE

## 2019-01-01 PROCEDURE — 99283 EMERGENCY DEPT VISIT LOW MDM: CPT | Performed by: NURSE PRACTITIONER

## 2019-01-01 PROCEDURE — 25010000002 PERFLUTREN 6.52 MG/ML SUSPENSION: Performed by: INTERNAL MEDICINE

## 2019-01-01 PROCEDURE — 99232 SBSQ HOSP IP/OBS MODERATE 35: CPT | Performed by: INTERNAL MEDICINE

## 2019-01-01 PROCEDURE — 84439 ASSAY OF FREE THYROXINE: CPT | Performed by: NURSE PRACTITIONER

## 2019-01-01 PROCEDURE — 93005 ELECTROCARDIOGRAM TRACING: CPT | Performed by: INTERNAL MEDICINE

## 2019-01-01 PROCEDURE — 85379 FIBRIN DEGRADATION QUANT: CPT | Performed by: EMERGENCY MEDICINE

## 2019-01-01 PROCEDURE — 82728 ASSAY OF FERRITIN: CPT | Performed by: NURSE PRACTITIONER

## 2019-01-01 PROCEDURE — B2151ZZ FLUOROSCOPY OF LEFT HEART USING LOW OSMOLAR CONTRAST: ICD-10-PCS | Performed by: INTERNAL MEDICINE

## 2019-01-01 PROCEDURE — 78452 HT MUSCLE IMAGE SPECT MULT: CPT | Performed by: INTERNAL MEDICINE

## 2019-01-01 PROCEDURE — 63710000001 BARIUM SULFATE 98 % RECONSTITUTED SUSPENSION: Performed by: NURSE PRACTITIONER

## 2019-01-01 PROCEDURE — 94640 AIRWAY INHALATION TREATMENT: CPT

## 2019-01-01 PROCEDURE — 94618 PULMONARY STRESS TESTING: CPT

## 2019-01-01 PROCEDURE — 95810 POLYSOM 6/> YRS 4/> PARAM: CPT | Performed by: PSYCHIATRY & NEUROLOGY

## 2019-01-01 PROCEDURE — 74246 X-RAY XM UPR GI TRC 2CNTRST: CPT

## 2019-01-01 PROCEDURE — 63710000001 SOD BICARB-CITRIC ACID-SIMETHICONE 2.21-1.53-0.04 G PACK: Performed by: NURSE PRACTITIONER

## 2019-01-01 PROCEDURE — 25010000002 IOPAMIDOL 61 % SOLUTION: Performed by: INTERNAL MEDICINE

## 2019-01-01 DEVICE — XIENCE SIERRA™ EVEROLIMUS ELUTING CORONARY STENT SYSTEM 3.00 MM X 12 MM / RAPID-EXCHANGE
Type: IMPLANTABLE DEVICE | Status: FUNCTIONAL
Brand: XIENCE SIERRA™

## 2019-01-01 RX ORDER — ISOSORBIDE MONONITRATE 60 MG/1
60 TABLET, EXTENDED RELEASE ORAL DAILY
Status: DISCONTINUED | OUTPATIENT
Start: 2019-01-01 | End: 2019-01-01 | Stop reason: HOSPADM

## 2019-01-01 RX ORDER — NITROGLYCERIN 0.4 MG/1
0.4 TABLET SUBLINGUAL
Status: DISCONTINUED | OUTPATIENT
Start: 2019-01-01 | End: 2019-01-01 | Stop reason: SDUPTHER

## 2019-01-01 RX ORDER — LOSARTAN POTASSIUM 50 MG/1
50 TABLET ORAL
Status: DISCONTINUED | OUTPATIENT
Start: 2019-01-01 | End: 2019-01-01

## 2019-01-01 RX ORDER — MIDAZOLAM HYDROCHLORIDE 1 MG/ML
INJECTION INTRAMUSCULAR; INTRAVENOUS AS NEEDED
Status: DISCONTINUED | OUTPATIENT
Start: 2019-01-01 | End: 2019-01-01 | Stop reason: HOSPADM

## 2019-01-01 RX ORDER — ONDANSETRON 2 MG/ML
4 INJECTION INTRAMUSCULAR; INTRAVENOUS ONCE
Status: COMPLETED | OUTPATIENT
Start: 2019-01-01 | End: 2019-01-01

## 2019-01-01 RX ORDER — FENTANYL CITRATE 50 UG/ML
INJECTION, SOLUTION INTRAMUSCULAR; INTRAVENOUS AS NEEDED
Status: DISCONTINUED | OUTPATIENT
Start: 2019-01-01 | End: 2019-01-01 | Stop reason: HOSPADM

## 2019-01-01 RX ORDER — SODIUM CHLORIDE 0.9 % (FLUSH) 0.9 %
10 SYRINGE (ML) INJECTION AS NEEDED
Status: DISCONTINUED | OUTPATIENT
Start: 2019-01-01 | End: 2019-01-01 | Stop reason: HOSPADM

## 2019-01-01 RX ORDER — ALBUTEROL SULFATE 2.5 MG/3ML
2.5 SOLUTION RESPIRATORY (INHALATION) ONCE
Status: COMPLETED | OUTPATIENT
Start: 2019-01-01 | End: 2019-01-01

## 2019-01-01 RX ORDER — ALUMINA, MAGNESIA, AND SIMETHICONE 2400; 2400; 240 MG/30ML; MG/30ML; MG/30ML
15 SUSPENSION ORAL EVERY 6 HOURS PRN
Status: DISCONTINUED | OUTPATIENT
Start: 2019-01-01 | End: 2019-01-01 | Stop reason: HOSPADM

## 2019-01-01 RX ORDER — SODIUM POLYSTYRENE SULFONATE 15 G/60ML
15 SUSPENSION ORAL; RECTAL ONCE
Qty: 60 ML | Refills: 0 | Status: SHIPPED | OUTPATIENT
Start: 2019-01-01 | End: 2019-01-01

## 2019-01-01 RX ORDER — FENOFIBRATE 145 MG/1
145 TABLET, COATED ORAL DAILY
Status: DISCONTINUED | OUTPATIENT
Start: 2019-01-01 | End: 2019-01-01 | Stop reason: HOSPADM

## 2019-01-01 RX ORDER — PANTOPRAZOLE SODIUM 20 MG/1
20 TABLET, DELAYED RELEASE ORAL DAILY
Status: DISCONTINUED | OUTPATIENT
Start: 2019-01-01 | End: 2019-01-01

## 2019-01-01 RX ORDER — PANTOPRAZOLE SODIUM 20 MG/1
20 TABLET, DELAYED RELEASE ORAL DAILY
COMMUNITY

## 2019-01-01 RX ORDER — SODIUM CHLORIDE 0.9 % (FLUSH) 0.9 %
3 SYRINGE (ML) INJECTION EVERY 12 HOURS SCHEDULED
Status: DISCONTINUED | OUTPATIENT
Start: 2019-01-01 | End: 2019-01-01 | Stop reason: HOSPADM

## 2019-01-01 RX ORDER — ATORVASTATIN CALCIUM 40 MG/1
40 TABLET, FILM COATED ORAL NIGHTLY
Qty: 30 TABLET | Refills: 2 | Status: SHIPPED | OUTPATIENT
Start: 2019-01-01

## 2019-01-01 RX ORDER — RANOLAZINE 500 MG/1
1000 TABLET, EXTENDED RELEASE ORAL 2 TIMES DAILY
Status: DISCONTINUED | OUTPATIENT
Start: 2019-01-01 | End: 2019-01-01 | Stop reason: HOSPADM

## 2019-01-01 RX ORDER — NICOTINE POLACRILEX 4 MG
15 LOZENGE BUCCAL
Status: DISCONTINUED | OUTPATIENT
Start: 2019-01-01 | End: 2019-01-01 | Stop reason: HOSPADM

## 2019-01-01 RX ORDER — ATORVASTATIN CALCIUM 20 MG/1
20 TABLET, FILM COATED ORAL NIGHTLY
COMMUNITY
End: 2019-01-01 | Stop reason: HOSPADM

## 2019-01-01 RX ORDER — FUROSEMIDE 40 MG/1
40 TABLET ORAL DAILY
Status: DISCONTINUED | OUTPATIENT
Start: 2019-01-01 | End: 2019-01-01 | Stop reason: HOSPADM

## 2019-01-01 RX ORDER — PANTOPRAZOLE SODIUM 40 MG/10ML
80 INJECTION, POWDER, LYOPHILIZED, FOR SOLUTION INTRAVENOUS ONCE
Status: COMPLETED | OUTPATIENT
Start: 2019-01-01 | End: 2019-01-01

## 2019-01-01 RX ORDER — ONDANSETRON 2 MG/ML
4 INJECTION INTRAMUSCULAR; INTRAVENOUS ONCE
Status: DISCONTINUED | OUTPATIENT
Start: 2019-01-01 | End: 2019-01-01 | Stop reason: HOSPADM

## 2019-01-01 RX ORDER — LISINOPRIL 5 MG/1
5 TABLET ORAL DAILY
Status: DISCONTINUED | OUTPATIENT
Start: 2019-01-01 | End: 2019-01-01 | Stop reason: HOSPADM

## 2019-01-01 RX ORDER — HYDROMORPHONE HYDROCHLORIDE 1 MG/ML
0.5 INJECTION, SOLUTION INTRAMUSCULAR; INTRAVENOUS; SUBCUTANEOUS ONCE
Status: COMPLETED | OUTPATIENT
Start: 2019-01-01 | End: 2019-01-01

## 2019-01-01 RX ORDER — ISOSORBIDE MONONITRATE 120 MG/1
120 TABLET, EXTENDED RELEASE ORAL DAILY
Status: DISCONTINUED | OUTPATIENT
Start: 2019-01-01 | End: 2019-01-01 | Stop reason: HOSPADM

## 2019-01-01 RX ORDER — METOPROLOL SUCCINATE 50 MG/1
50 TABLET, EXTENDED RELEASE ORAL DAILY
Status: DISCONTINUED | OUTPATIENT
Start: 2019-01-01 | End: 2019-01-01 | Stop reason: HOSPADM

## 2019-01-01 RX ORDER — DEXTROSE MONOHYDRATE 25 G/50ML
25 INJECTION, SOLUTION INTRAVENOUS
Status: DISCONTINUED | OUTPATIENT
Start: 2019-01-01 | End: 2019-01-01 | Stop reason: HOSPADM

## 2019-01-01 RX ORDER — AMIODARONE HYDROCHLORIDE 200 MG/1
200 TABLET ORAL DAILY
Status: DISCONTINUED | OUTPATIENT
Start: 2019-01-01 | End: 2019-01-01 | Stop reason: HOSPADM

## 2019-01-01 RX ORDER — ASPIRIN 81 MG/1
81 TABLET, CHEWABLE ORAL DAILY
Start: 2019-01-01 | End: 2019-01-01 | Stop reason: HOSPADM

## 2019-01-01 RX ORDER — OXYCODONE AND ACETAMINOPHEN 7.5; 325 MG/1; MG/1
1 TABLET ORAL EVERY 6 HOURS PRN
Qty: 12 TABLET | Refills: 0 | Status: SHIPPED | OUTPATIENT
Start: 2019-01-01

## 2019-01-01 RX ORDER — VITS A,C,E/LUTEIN/MINERALS 300MCG-200
1 TABLET ORAL DAILY
Status: DISCONTINUED | OUTPATIENT
Start: 2019-01-01 | End: 2019-01-01 | Stop reason: HOSPADM

## 2019-01-01 RX ORDER — SODIUM CHLORIDE 9 MG/ML
125 INJECTION, SOLUTION INTRAVENOUS CONTINUOUS
Status: DISPENSED | OUTPATIENT
Start: 2019-01-01 | End: 2019-01-01

## 2019-01-01 RX ORDER — POTASSIUM CHLORIDE 750 MG/1
10 CAPSULE, EXTENDED RELEASE ORAL DAILY
Status: DISCONTINUED | OUTPATIENT
Start: 2019-01-01 | End: 2019-01-01 | Stop reason: HOSPADM

## 2019-01-01 RX ORDER — FUROSEMIDE 10 MG/ML
40 INJECTION INTRAMUSCULAR; INTRAVENOUS ONCE
Status: COMPLETED | OUTPATIENT
Start: 2019-01-01 | End: 2019-01-01

## 2019-01-01 RX ORDER — ISOSORBIDE MONONITRATE 60 MG/1
60 TABLET, EXTENDED RELEASE ORAL DAILY
Qty: 90 TABLET | Refills: 3 | Status: SHIPPED | OUTPATIENT
Start: 2019-01-01 | End: 2019-01-01 | Stop reason: SDUPTHER

## 2019-01-01 RX ORDER — NITROGLYCERIN 0.4 MG/1
0.4 TABLET SUBLINGUAL
COMMUNITY

## 2019-01-01 RX ORDER — SODIUM BICARBONATE 650 MG/1
650 TABLET ORAL 2 TIMES DAILY
COMMUNITY

## 2019-01-01 RX ORDER — ECHINACEA PURPUREA EXTRACT 125 MG
2 TABLET ORAL AS NEEDED
Status: DISCONTINUED | OUTPATIENT
Start: 2019-01-01 | End: 2019-01-01 | Stop reason: HOSPADM

## 2019-01-01 RX ORDER — SODIUM CHLORIDE 9 MG/ML
100 INJECTION, SOLUTION INTRAVENOUS CONTINUOUS
Status: DISPENSED | OUTPATIENT
Start: 2019-01-01 | End: 2019-01-01

## 2019-01-01 RX ORDER — DOXYCYCLINE 100 MG/1
100 TABLET ORAL EVERY 12 HOURS SCHEDULED
Qty: 12 TABLET | Refills: 0 | Status: SHIPPED | OUTPATIENT
Start: 2019-01-01 | End: 2019-01-01

## 2019-01-01 RX ORDER — ISOSORBIDE MONONITRATE 60 MG/1
60 TABLET, EXTENDED RELEASE ORAL DAILY
COMMUNITY
End: 2019-01-01 | Stop reason: SDUPTHER

## 2019-01-01 RX ORDER — PANTOPRAZOLE SODIUM 40 MG/1
40 TABLET, DELAYED RELEASE ORAL DAILY
Status: DISCONTINUED | OUTPATIENT
Start: 2019-01-01 | End: 2019-01-01 | Stop reason: HOSPADM

## 2019-01-01 RX ORDER — ONDANSETRON 2 MG/ML
4 INJECTION INTRAMUSCULAR; INTRAVENOUS EVERY 6 HOURS PRN
Status: DISCONTINUED | OUTPATIENT
Start: 2019-01-01 | End: 2019-01-01 | Stop reason: HOSPADM

## 2019-01-01 RX ORDER — SODIUM CHLORIDE 0.9 % (FLUSH) 0.9 %
3-10 SYRINGE (ML) INJECTION AS NEEDED
Status: DISCONTINUED | OUTPATIENT
Start: 2019-01-01 | End: 2019-01-01 | Stop reason: HOSPADM

## 2019-01-01 RX ORDER — DOXYCYCLINE 100 MG/1
100 TABLET ORAL EVERY 12 HOURS SCHEDULED
Status: DISCONTINUED | OUTPATIENT
Start: 2019-01-01 | End: 2019-01-01 | Stop reason: HOSPADM

## 2019-01-01 RX ORDER — ACETAMINOPHEN 325 MG/1
650 TABLET ORAL EVERY 4 HOURS PRN
Status: DISCONTINUED | OUTPATIENT
Start: 2019-01-01 | End: 2019-01-01 | Stop reason: HOSPADM

## 2019-01-01 RX ORDER — MULTIVIT WITH MINERALS/LUTEIN
1000 TABLET ORAL DAILY
Status: DISCONTINUED | OUTPATIENT
Start: 2019-01-01 | End: 2019-01-01 | Stop reason: HOSPADM

## 2019-01-01 RX ORDER — DIAZEPAM 5 MG/1
5 TABLET ORAL NIGHTLY PRN
Status: DISCONTINUED | OUTPATIENT
Start: 2019-01-01 | End: 2019-01-01 | Stop reason: HOSPADM

## 2019-01-01 RX ORDER — INSULIN GLARGINE 100 [IU]/ML
80 INJECTION, SOLUTION SUBCUTANEOUS NIGHTLY
COMMUNITY

## 2019-01-01 RX ORDER — ASPIRIN 325 MG
325 TABLET ORAL DAILY
Status: DISCONTINUED | OUTPATIENT
Start: 2019-01-01 | End: 2019-01-01

## 2019-01-01 RX ORDER — AMIODARONE HYDROCHLORIDE 200 MG/1
200 TABLET ORAL DAILY
Status: DISCONTINUED | OUTPATIENT
Start: 2019-01-01 | End: 2019-01-01

## 2019-01-01 RX ORDER — ATORVASTATIN CALCIUM 40 MG/1
40 TABLET, FILM COATED ORAL NIGHTLY
Status: DISCONTINUED | OUTPATIENT
Start: 2019-01-01 | End: 2019-01-01 | Stop reason: HOSPADM

## 2019-01-01 RX ORDER — ASPIRIN 81 MG/1
81 TABLET, CHEWABLE ORAL DAILY
Status: DISCONTINUED | OUTPATIENT
Start: 2019-01-01 | End: 2019-01-01

## 2019-01-01 RX ORDER — ASPIRIN 325 MG
TABLET ORAL
Status: COMPLETED
Start: 2019-01-01 | End: 2019-01-01

## 2019-01-01 RX ORDER — ASPIRIN 81 MG/1
81 TABLET, CHEWABLE ORAL DAILY
Status: DISCONTINUED | OUTPATIENT
Start: 2019-01-01 | End: 2019-01-01 | Stop reason: HOSPADM

## 2019-01-01 RX ORDER — ATORVASTATIN CALCIUM 10 MG/1
20 TABLET, FILM COATED ORAL NIGHTLY
Status: DISCONTINUED | OUTPATIENT
Start: 2019-01-01 | End: 2019-01-01

## 2019-01-01 RX ORDER — ASPIRIN 81 MG/1
324 TABLET, CHEWABLE ORAL ONCE
Status: COMPLETED | OUTPATIENT
Start: 2019-01-01 | End: 2019-01-01

## 2019-01-01 RX ORDER — RANOLAZINE 500 MG/1
500 TABLET, EXTENDED RELEASE ORAL EVERY 12 HOURS SCHEDULED
Status: DISCONTINUED | OUTPATIENT
Start: 2019-01-01 | End: 2019-01-01 | Stop reason: HOSPADM

## 2019-01-01 RX ORDER — NITROGLYCERIN 0.4 MG/1
0.4 TABLET SUBLINGUAL
Status: DISCONTINUED | OUTPATIENT
Start: 2019-01-01 | End: 2019-01-01 | Stop reason: HOSPADM

## 2019-01-01 RX ORDER — ISOSORBIDE MONONITRATE 120 MG/1
120 TABLET, EXTENDED RELEASE ORAL DAILY
Qty: 90 TABLET | Refills: 3 | Status: SHIPPED | OUTPATIENT
Start: 2019-01-01

## 2019-01-01 RX ORDER — HEPARIN SODIUM 1000 [USP'U]/ML
INJECTION, SOLUTION INTRAVENOUS; SUBCUTANEOUS AS NEEDED
Status: DISCONTINUED | OUTPATIENT
Start: 2019-01-01 | End: 2019-01-01 | Stop reason: HOSPADM

## 2019-01-01 RX ORDER — LIDOCAINE HYDROCHLORIDE 20 MG/ML
INJECTION, SOLUTION INFILTRATION; PERINEURAL AS NEEDED
Status: DISCONTINUED | OUTPATIENT
Start: 2019-01-01 | End: 2019-01-01 | Stop reason: HOSPADM

## 2019-01-01 RX ORDER — METHYLPREDNISOLONE SODIUM SUCCINATE 125 MG/2ML
60 INJECTION, POWDER, LYOPHILIZED, FOR SOLUTION INTRAMUSCULAR; INTRAVENOUS ONCE
Status: COMPLETED | OUTPATIENT
Start: 2019-01-01 | End: 2019-01-01

## 2019-01-01 RX ORDER — BUDESONIDE AND FORMOTEROL FUMARATE DIHYDRATE 80; 4.5 UG/1; UG/1
2 AEROSOL RESPIRATORY (INHALATION)
Qty: 1 INHALER | Refills: 0 | COMMUNITY
Start: 2019-01-01 | End: 2019-01-01

## 2019-01-01 RX ORDER — LISINOPRIL 5 MG/1
5 TABLET ORAL DAILY
COMMUNITY

## 2019-01-01 RX ORDER — POTASSIUM CHLORIDE 750 MG/1
10 TABLET, EXTENDED RELEASE ORAL DAILY
Status: DISCONTINUED | OUTPATIENT
Start: 2019-01-01 | End: 2019-01-01 | Stop reason: CLARIF

## 2019-01-01 RX ORDER — FUROSEMIDE 40 MG/1
40 TABLET ORAL DAILY
Status: DISCONTINUED | OUTPATIENT
Start: 2019-01-01 | End: 2019-01-01

## 2019-01-01 RX ORDER — MOMETASONE FUROATE 1 MG/ML
1 SOLUTION TOPICAL 2 TIMES DAILY
COMMUNITY
End: 2019-01-01

## 2019-01-01 RX ORDER — ASPIRIN 81 MG/1
324 TABLET, CHEWABLE ORAL ONCE
Status: DISCONTINUED | OUTPATIENT
Start: 2019-01-01 | End: 2019-01-01

## 2019-01-01 RX ORDER — LEVOTHYROXINE SODIUM 0.03 MG/1
TABLET ORAL
Refills: 1 | COMMUNITY
Start: 2019-01-01

## 2019-01-01 RX ORDER — AZELASTINE 1 MG/ML
2 SPRAY, METERED NASAL 2 TIMES DAILY PRN
COMMUNITY
End: 2019-01-01 | Stop reason: ALTCHOICE

## 2019-01-01 RX ORDER — SODIUM BICARBONATE 650 MG/1
650 TABLET ORAL 2 TIMES DAILY
Status: DISCONTINUED | OUTPATIENT
Start: 2019-01-01 | End: 2019-01-01 | Stop reason: HOSPADM

## 2019-01-01 RX ORDER — PANTOPRAZOLE SODIUM 20 MG/1
20 TABLET, DELAYED RELEASE ORAL
Status: DISCONTINUED | OUTPATIENT
Start: 2019-01-01 | End: 2019-01-01 | Stop reason: HOSPADM

## 2019-01-01 RX ORDER — TAMSULOSIN HYDROCHLORIDE 0.4 MG/1
0.4 CAPSULE ORAL DAILY
Status: DISCONTINUED | OUTPATIENT
Start: 2019-01-01 | End: 2019-01-01 | Stop reason: HOSPADM

## 2019-01-01 RX ORDER — SERTRALINE HYDROCHLORIDE 25 MG/1
25 TABLET, FILM COATED ORAL DAILY
Status: DISCONTINUED | OUTPATIENT
Start: 2019-01-01 | End: 2019-01-01 | Stop reason: HOSPADM

## 2019-01-01 RX ORDER — RANOLAZINE 1000 MG/1
1000 TABLET, EXTENDED RELEASE ORAL 2 TIMES DAILY
Qty: 180 TABLET | Refills: 3 | Status: SHIPPED | OUTPATIENT
Start: 2019-01-01

## 2019-01-01 RX ADMIN — TICAGRELOR 90 MG: 90 TABLET ORAL at 21:45

## 2019-01-01 RX ADMIN — DOXYCYCLINE 100 MG: 100 TABLET ORAL at 08:28

## 2019-01-01 RX ADMIN — METOPROLOL SUCCINATE 50 MG: 50 TABLET, FILM COATED, EXTENDED RELEASE ORAL at 08:20

## 2019-01-01 RX ADMIN — LISINOPRIL 5 MG: 5 TABLET ORAL at 09:55

## 2019-01-01 RX ADMIN — RANOLAZINE 1000 MG: 500 TABLET, FILM COATED, EXTENDED RELEASE ORAL at 08:28

## 2019-01-01 RX ADMIN — SERTRALINE 25 MG: 50 TABLET, FILM COATED ORAL at 08:54

## 2019-01-01 RX ADMIN — SODIUM CHLORIDE 125 ML/HR: 9 INJECTION, SOLUTION INTRAVENOUS at 09:42

## 2019-01-01 RX ADMIN — Medication 1 TABLET: at 08:28

## 2019-01-01 RX ADMIN — REGADENOSON 0.4 MG: 0.08 INJECTION, SOLUTION INTRAVENOUS at 12:27

## 2019-01-01 RX ADMIN — AMIODARONE HYDROCHLORIDE 200 MG: 200 TABLET ORAL at 08:20

## 2019-01-01 RX ADMIN — SODIUM CHLORIDE, PRESERVATIVE FREE 3 ML: 5 INJECTION INTRAVENOUS at 09:56

## 2019-01-01 RX ADMIN — AMIODARONE HYDROCHLORIDE 200 MG: 200 TABLET ORAL at 09:43

## 2019-01-01 RX ADMIN — RANOLAZINE 1000 MG: 500 TABLET, FILM COATED, EXTENDED RELEASE ORAL at 09:43

## 2019-01-01 RX ADMIN — SERTRALINE HYDROCHLORIDE 25 MG: 25 TABLET ORAL at 09:55

## 2019-01-01 RX ADMIN — ATORVASTATIN CALCIUM 40 MG: 40 TABLET, FILM COATED ORAL at 20:54

## 2019-01-01 RX ADMIN — TAMSULOSIN HYDROCHLORIDE 0.4 MG: 0.4 CAPSULE ORAL at 08:54

## 2019-01-01 RX ADMIN — AMIODARONE HYDROCHLORIDE 200 MG: 200 TABLET ORAL at 08:35

## 2019-01-01 RX ADMIN — Medication 1 TABLET: at 09:55

## 2019-01-01 RX ADMIN — TAMSULOSIN HYDROCHLORIDE 0.4 MG: 0.4 CAPSULE ORAL at 08:20

## 2019-01-01 RX ADMIN — RANOLAZINE 1000 MG: 500 TABLET, FILM COATED, EXTENDED RELEASE ORAL at 20:54

## 2019-01-01 RX ADMIN — TAMSULOSIN HYDROCHLORIDE 0.4 MG: 0.4 CAPSULE ORAL at 08:36

## 2019-01-01 RX ADMIN — SODIUM BICARBONATE 650 MG: 650 TABLET ORAL at 20:23

## 2019-01-01 RX ADMIN — INSULIN DETEMIR 50 UNITS: 100 INJECTION, SOLUTION SUBCUTANEOUS at 21:14

## 2019-01-01 RX ADMIN — ATORVASTATIN CALCIUM 40 MG: 40 TABLET, FILM COATED ORAL at 21:18

## 2019-01-01 RX ADMIN — Medication 1 TABLET: at 09:44

## 2019-01-01 RX ADMIN — SODIUM CHLORIDE, PRESERVATIVE FREE 3 ML: 5 INJECTION INTRAVENOUS at 20:22

## 2019-01-01 RX ADMIN — METOPROLOL SUCCINATE 50 MG: 50 TABLET, FILM COATED, EXTENDED RELEASE ORAL at 08:35

## 2019-01-01 RX ADMIN — BARIUM SULFATE 50 ML: 980 POWDER, FOR SUSPENSION ORAL at 11:48

## 2019-01-01 RX ADMIN — AMIODARONE HYDROCHLORIDE 200 MG: 200 TABLET ORAL at 09:55

## 2019-01-01 RX ADMIN — ANTACID/ANTIFLATULENT 1 TABLET: 380; 550; 10; 10 GRANULE, EFFERVESCENT ORAL at 11:48

## 2019-01-01 RX ADMIN — Medication 1000 UNITS: at 09:54

## 2019-01-01 RX ADMIN — ISOSORBIDE MONONITRATE 60 MG: 60 TABLET, EXTENDED RELEASE ORAL at 08:20

## 2019-01-01 RX ADMIN — ASPIRIN 81 MG 324 MG: 81 TABLET ORAL at 10:18

## 2019-01-01 RX ADMIN — POTASSIUM CHLORIDE 10 MEQ: 750 CAPSULE, EXTENDED RELEASE ORAL at 08:36

## 2019-01-01 RX ADMIN — TICAGRELOR 90 MG: 90 TABLET ORAL at 23:51

## 2019-01-01 RX ADMIN — SODIUM CHLORIDE, PRESERVATIVE FREE 3 ML: 5 INJECTION INTRAVENOUS at 08:27

## 2019-01-01 RX ADMIN — METOPROLOL SUCCINATE 75 MG: 50 TABLET, FILM COATED, EXTENDED RELEASE ORAL at 09:55

## 2019-01-01 RX ADMIN — TICAGRELOR 90 MG: 90 TABLET ORAL at 09:43

## 2019-01-01 RX ADMIN — FUROSEMIDE 40 MG: 40 TABLET ORAL at 08:20

## 2019-01-01 RX ADMIN — METOPROLOL SUCCINATE 50 MG: 50 TABLET, FILM COATED, EXTENDED RELEASE ORAL at 08:54

## 2019-01-01 RX ADMIN — ATORVASTATIN CALCIUM 20 MG: 10 TABLET, FILM COATED ORAL at 21:18

## 2019-01-01 RX ADMIN — TAMSULOSIN HYDROCHLORIDE 0.4 MG: 0.4 CAPSULE ORAL at 09:43

## 2019-01-01 RX ADMIN — Medication 1000 UNITS: at 08:28

## 2019-01-01 RX ADMIN — RIVAROXABAN 15 MG: 15 TABLET, FILM COATED ORAL at 17:33

## 2019-01-01 RX ADMIN — PERFLUTREN 9.78 MG: 6.52 INJECTION, SUSPENSION INTRAVENOUS at 14:43

## 2019-01-01 RX ADMIN — RANOLAZINE 500 MG: 500 TABLET, FILM COATED, EXTENDED RELEASE ORAL at 21:14

## 2019-01-01 RX ADMIN — ISOSORBIDE MONONITRATE 120 MG: 120 TABLET, EXTENDED RELEASE ORAL at 09:43

## 2019-01-01 RX ADMIN — FUROSEMIDE 40 MG: 40 TABLET ORAL at 08:35

## 2019-01-01 RX ADMIN — Medication 81 MG: at 08:35

## 2019-01-01 RX ADMIN — BARIUM SULFATE 100 ML: 960 POWDER, FOR SUSPENSION ORAL at 11:48

## 2019-01-01 RX ADMIN — SODIUM CHLORIDE 100 ML/HR: 9 INJECTION, SOLUTION INTRAVENOUS at 11:36

## 2019-01-01 RX ADMIN — ATORVASTATIN CALCIUM 40 MG: 40 TABLET, FILM COATED ORAL at 20:23

## 2019-01-01 RX ADMIN — ISOSORBIDE MONONITRATE 120 MG: 120 TABLET, EXTENDED RELEASE ORAL at 08:28

## 2019-01-01 RX ADMIN — POTASSIUM CHLORIDE 10 MEQ: 750 CAPSULE, EXTENDED RELEASE ORAL at 08:54

## 2019-01-01 RX ADMIN — IRON SUCROSE 300 MG: 20 INJECTION, SOLUTION INTRAVENOUS at 09:42

## 2019-01-01 RX ADMIN — Medication 2 SPRAY: at 22:06

## 2019-01-01 RX ADMIN — SODIUM BICARBONATE 650 MG: 650 TABLET ORAL at 09:43

## 2019-01-01 RX ADMIN — FENOFIBRATE 145 MG: 145 TABLET ORAL at 09:44

## 2019-01-01 RX ADMIN — SODIUM BICARBONATE 650 MG: 650 TABLET ORAL at 09:55

## 2019-01-01 RX ADMIN — PANTOPRAZOLE SODIUM 20 MG: 20 TABLET, DELAYED RELEASE ORAL at 06:08

## 2019-01-01 RX ADMIN — METHYLPREDNISOLONE SODIUM SUCCINATE 60 MG: 125 INJECTION, POWDER, FOR SOLUTION INTRAMUSCULAR; INTRAVENOUS at 09:42

## 2019-01-01 RX ADMIN — RANOLAZINE 1000 MG: 500 TABLET, FILM COATED, EXTENDED RELEASE ORAL at 20:23

## 2019-01-01 RX ADMIN — POTASSIUM CHLORIDE 10 MEQ: 750 CAPSULE, EXTENDED RELEASE ORAL at 08:22

## 2019-01-01 RX ADMIN — ONDANSETRON HYDROCHLORIDE 4 MG: 2 SOLUTION INTRAMUSCULAR; INTRAVENOUS at 06:56

## 2019-01-01 RX ADMIN — FUROSEMIDE 40 MG: 10 INJECTION, SOLUTION INTRAMUSCULAR; INTRAVENOUS at 16:13

## 2019-01-01 RX ADMIN — LISINOPRIL 5 MG: 5 TABLET ORAL at 08:19

## 2019-01-01 RX ADMIN — SODIUM CHLORIDE, PRESERVATIVE FREE 3 ML: 5 INJECTION INTRAVENOUS at 08:21

## 2019-01-01 RX ADMIN — INSULIN DETEMIR 80 UNITS: 100 INJECTION, SOLUTION SUBCUTANEOUS at 20:54

## 2019-01-01 RX ADMIN — METOPROLOL SUCCINATE 75 MG: 50 TABLET, FILM COATED, EXTENDED RELEASE ORAL at 09:43

## 2019-01-01 RX ADMIN — IPRATROPIUM BROMIDE 0.5 MG: 0.5 SOLUTION RESPIRATORY (INHALATION) at 16:23

## 2019-01-01 RX ADMIN — RIVAROXABAN 20 MG: 20 TABLET, FILM COATED ORAL at 17:21

## 2019-01-01 RX ADMIN — METOPROLOL SUCCINATE 75 MG: 50 TABLET, FILM COATED, EXTENDED RELEASE ORAL at 08:28

## 2019-01-01 RX ADMIN — SERTRALINE HYDROCHLORIDE 25 MG: 25 TABLET ORAL at 09:43

## 2019-01-01 RX ADMIN — ASPIRIN 81 MG: 81 TABLET, CHEWABLE ORAL at 09:55

## 2019-01-01 RX ADMIN — RANOLAZINE 500 MG: 500 TABLET, FILM COATED, EXTENDED RELEASE ORAL at 21:18

## 2019-01-01 RX ADMIN — FENOFIBRATE 145 MG: 145 TABLET ORAL at 08:28

## 2019-01-01 RX ADMIN — FENOFIBRATE 145 MG: 145 TABLET ORAL at 09:57

## 2019-01-01 RX ADMIN — SERTRALINE 25 MG: 50 TABLET, FILM COATED ORAL at 08:20

## 2019-01-01 RX ADMIN — SODIUM CHLORIDE, PRESERVATIVE FREE 3 ML: 5 INJECTION INTRAVENOUS at 08:36

## 2019-01-01 RX ADMIN — TECHNETIUM TC 99M SESTAMIBI 1 DOSE: 1 INJECTION INTRAVENOUS at 12:27

## 2019-01-01 RX ADMIN — RANOLAZINE 1000 MG: 500 TABLET, FILM COATED, EXTENDED RELEASE ORAL at 09:55

## 2019-01-01 RX ADMIN — RANOLAZINE 500 MG: 500 TABLET, FILM COATED, EXTENDED RELEASE ORAL at 08:53

## 2019-01-01 RX ADMIN — PANTOPRAZOLE SODIUM 20 MG: 20 TABLET, DELAYED RELEASE ORAL at 09:55

## 2019-01-01 RX ADMIN — RANOLAZINE 1000 MG: 500 TABLET, FILM COATED, EXTENDED RELEASE ORAL at 23:51

## 2019-01-01 RX ADMIN — TICAGRELOR 90 MG: 90 TABLET ORAL at 08:35

## 2019-01-01 RX ADMIN — TAMSULOSIN HYDROCHLORIDE 0.4 MG: 0.4 CAPSULE ORAL at 08:29

## 2019-01-01 RX ADMIN — RANOLAZINE 500 MG: 500 TABLET, FILM COATED, EXTENDED RELEASE ORAL at 08:36

## 2019-01-01 RX ADMIN — LISINOPRIL 5 MG: 5 TABLET ORAL at 08:54

## 2019-01-01 RX ADMIN — DOXYCYCLINE 100 MG: 100 TABLET ORAL at 20:23

## 2019-01-01 RX ADMIN — ISOSORBIDE MONONITRATE 60 MG: 60 TABLET, EXTENDED RELEASE ORAL at 08:54

## 2019-01-01 RX ADMIN — SODIUM CHLORIDE, PRESERVATIVE FREE 3 ML: 5 INJECTION INTRAVENOUS at 15:25

## 2019-01-01 RX ADMIN — ALBUTEROL SULFATE 2.5 MG: 2.5 SOLUTION RESPIRATORY (INHALATION) at 17:31

## 2019-01-01 RX ADMIN — POTASSIUM CHLORIDE 10 MEQ: 750 CAPSULE, EXTENDED RELEASE ORAL at 09:55

## 2019-01-01 RX ADMIN — LISINOPRIL 5 MG: 5 TABLET ORAL at 08:29

## 2019-01-01 RX ADMIN — SODIUM CHLORIDE, PRESERVATIVE FREE 3 ML: 5 INJECTION INTRAVENOUS at 23:51

## 2019-01-01 RX ADMIN — TICAGRELOR 90 MG: 90 TABLET ORAL at 20:54

## 2019-01-01 RX ADMIN — INSULIN LISPRO 2 UNITS: 100 INJECTION, SOLUTION INTRAVENOUS; SUBCUTANEOUS at 17:19

## 2019-01-01 RX ADMIN — SODIUM CHLORIDE, PRESERVATIVE FREE 3 ML: 5 INJECTION INTRAVENOUS at 09:43

## 2019-01-01 RX ADMIN — IPRATROPIUM BROMIDE 0.5 MG: 0.5 SOLUTION RESPIRATORY (INHALATION) at 15:42

## 2019-01-01 RX ADMIN — TECHNETIUM TC 99M SESTAMIBI 1 DOSE: 1 INJECTION INTRAVENOUS at 11:40

## 2019-01-01 RX ADMIN — RANOLAZINE 500 MG: 500 TABLET, FILM COATED, EXTENDED RELEASE ORAL at 08:19

## 2019-01-01 RX ADMIN — IPRATROPIUM BROMIDE 0.5 MG: 0.5 SOLUTION RESPIRATORY (INHALATION) at 19:00

## 2019-01-01 RX ADMIN — SODIUM CHLORIDE, PRESERVATIVE FREE 3 ML: 5 INJECTION INTRAVENOUS at 08:53

## 2019-01-01 RX ADMIN — POTASSIUM CHLORIDE 10 MEQ: 750 CAPSULE, EXTENDED RELEASE ORAL at 09:43

## 2019-01-01 RX ADMIN — HYDROMORPHONE HYDROCHLORIDE 0.5 MG: 1 INJECTION, SOLUTION INTRAMUSCULAR; INTRAVENOUS; SUBCUTANEOUS at 06:56

## 2019-01-01 RX ADMIN — SODIUM BICARBONATE 650 MG: 650 TABLET ORAL at 08:28

## 2019-01-01 RX ADMIN — TAMSULOSIN HYDROCHLORIDE 0.4 MG: 0.4 CAPSULE ORAL at 09:55

## 2019-01-01 RX ADMIN — IPRATROPIUM BROMIDE 0.5 MG: 0.5 SOLUTION RESPIRATORY (INHALATION) at 07:26

## 2019-01-01 RX ADMIN — PANTOPRAZOLE SODIUM 80 MG: 40 INJECTION, POWDER, FOR SOLUTION INTRAVENOUS at 21:28

## 2019-01-01 RX ADMIN — SERTRALINE HYDROCHLORIDE 25 MG: 25 TABLET ORAL at 08:28

## 2019-01-01 RX ADMIN — SODIUM CHLORIDE, PRESERVATIVE FREE 3 ML: 5 INJECTION INTRAVENOUS at 21:14

## 2019-01-01 RX ADMIN — IPRATROPIUM BROMIDE 0.5 MG: 0.5 SOLUTION RESPIRATORY (INHALATION) at 08:10

## 2019-01-01 RX ADMIN — RIVAROXABAN 15 MG: 15 TABLET, FILM COATED ORAL at 23:51

## 2019-01-01 RX ADMIN — LISINOPRIL 5 MG: 5 TABLET ORAL at 08:36

## 2019-01-01 RX ADMIN — DOXYCYCLINE 100 MG: 100 TABLET ORAL at 09:43

## 2019-01-01 RX ADMIN — SODIUM CHLORIDE, PRESERVATIVE FREE 3 ML: 5 INJECTION INTRAVENOUS at 21:18

## 2019-01-01 RX ADMIN — AMIODARONE HYDROCHLORIDE 200 MG: 200 TABLET ORAL at 08:54

## 2019-01-01 RX ADMIN — IPRATROPIUM BROMIDE 0.5 MG: 0.5 SOLUTION RESPIRATORY (INHALATION) at 11:02

## 2019-01-01 RX ADMIN — FUROSEMIDE 40 MG: 10 INJECTION, SOLUTION INTRAMUSCULAR; INTRAVENOUS at 11:10

## 2019-01-01 RX ADMIN — Medication 1000 UNITS: at 09:43

## 2019-01-01 RX ADMIN — PANTOPRAZOLE SODIUM 40 MG: 40 TABLET, DELAYED RELEASE ORAL at 09:43

## 2019-01-01 RX ADMIN — SODIUM CHLORIDE, PRESERVATIVE FREE 3 ML: 5 INJECTION INTRAVENOUS at 20:54

## 2019-01-01 RX ADMIN — INSULIN DETEMIR 50 UNITS: 100 INJECTION, SOLUTION SUBCUTANEOUS at 21:18

## 2019-01-01 RX ADMIN — ASPIRIN 81 MG: 81 TABLET, CHEWABLE ORAL at 09:43

## 2019-01-01 RX ADMIN — FUROSEMIDE 40 MG: 40 TABLET ORAL at 08:54

## 2019-01-01 RX ADMIN — SODIUM CHLORIDE, PRESERVATIVE FREE 3 ML: 5 INJECTION INTRAVENOUS at 21:19

## 2019-01-01 RX ADMIN — PANTOPRAZOLE SODIUM 20 MG: 20 TABLET, DELAYED RELEASE ORAL at 07:19

## 2019-01-01 RX ADMIN — INSULIN DETEMIR 50 UNITS: 100 INJECTION, SOLUTION SUBCUTANEOUS at 21:19

## 2019-01-01 RX ADMIN — TICAGRELOR 90 MG: 90 TABLET ORAL at 08:29

## 2019-01-01 RX ADMIN — ISOSORBIDE MONONITRATE 120 MG: 120 TABLET, EXTENDED RELEASE ORAL at 09:54

## 2019-01-01 RX ADMIN — LISINOPRIL 5 MG: 5 TABLET ORAL at 09:43

## 2019-01-01 RX ADMIN — TICAGRELOR 90 MG: 90 TABLET ORAL at 09:54

## 2019-01-01 RX ADMIN — ISOSORBIDE MONONITRATE 60 MG: 60 TABLET, EXTENDED RELEASE ORAL at 08:36

## 2019-01-01 RX ADMIN — BARIUM SULFATE 700 MG: 700 TABLET ORAL at 11:48

## 2019-01-01 RX ADMIN — IPRATROPIUM BROMIDE 0.5 MG: 0.5 SOLUTION RESPIRATORY (INHALATION) at 11:50

## 2019-01-01 RX ADMIN — POTASSIUM CHLORIDE 10 MEQ: 750 CAPSULE, EXTENDED RELEASE ORAL at 08:28

## 2019-01-01 RX ADMIN — FUROSEMIDE 40 MG: 40 TABLET ORAL at 09:55

## 2019-01-01 RX ADMIN — SODIUM BICARBONATE 650 MG: 650 TABLET ORAL at 23:51

## 2019-01-01 RX ADMIN — IPRATROPIUM BROMIDE 0.5 MG: 0.5 SOLUTION RESPIRATORY (INHALATION) at 22:03

## 2019-01-01 RX ADMIN — Medication 325 MG: at 09:07

## 2019-01-01 RX ADMIN — SODIUM BICARBONATE 650 MG: 650 TABLET ORAL at 20:54

## 2019-01-01 RX ADMIN — SERTRALINE 25 MG: 50 TABLET, FILM COATED ORAL at 08:36

## 2019-01-01 RX ADMIN — RIVAROXABAN 15 MG: 15 TABLET, FILM COATED ORAL at 17:36

## 2019-01-01 RX ADMIN — INSULIN DETEMIR 80 UNITS: 100 INJECTION, SOLUTION SUBCUTANEOUS at 00:11

## 2019-01-01 RX ADMIN — ATORVASTATIN CALCIUM 20 MG: 10 TABLET, FILM COATED ORAL at 21:14

## 2019-01-01 RX ADMIN — INSULIN DETEMIR 80 UNITS: 100 INJECTION, SOLUTION SUBCUTANEOUS at 20:22

## 2019-01-01 RX ADMIN — ASPIRIN 325 MG: 325 TABLET ORAL at 09:07

## 2019-01-01 RX ADMIN — TICAGRELOR 90 MG: 90 TABLET ORAL at 20:23

## 2019-01-01 RX ADMIN — ATORVASTATIN CALCIUM 40 MG: 40 TABLET, FILM COATED ORAL at 23:51

## 2019-01-01 RX ADMIN — PANTOPRAZOLE SODIUM 40 MG: 40 TABLET, DELAYED RELEASE ORAL at 08:28

## 2019-01-23 PROBLEM — I20.0 UNSTABLE ANGINA PECTORIS (HCC): Status: ACTIVE | Noted: 2019-01-01

## 2019-01-23 PROBLEM — R07.2 PRECORDIAL PAIN: Status: ACTIVE | Noted: 2019-01-01

## 2019-01-23 PROBLEM — Z95.5 HISTORY OF CORONARY ARTERY STENT PLACEMENT: Chronic | Status: ACTIVE | Noted: 2018-01-11

## 2019-01-23 PROBLEM — I50.32 CHRONIC DIASTOLIC CONGESTIVE HEART FAILURE (HCC): Chronic | Status: ACTIVE | Noted: 2018-01-11

## 2019-01-23 PROBLEM — I25.119 CORONARY ARTERY DISEASE INVOLVING NATIVE CORONARY ARTERY OF NATIVE HEART WITH ANGINA PECTORIS (HCC): Status: ACTIVE | Noted: 2019-01-01

## 2019-01-23 PROBLEM — Z79.01 CHRONIC ANTICOAGULATION: Chronic | Status: ACTIVE | Noted: 2018-01-11

## 2019-01-24 PROBLEM — I20.0 UNSTABLE ANGINA PECTORIS (HCC): Status: ACTIVE | Noted: 2019-01-01

## 2019-01-27 NOTE — OUTREACH NOTE
Prep Survey      Responses   Facility patient discharged from?  [facility]   Is patient eligible?  Yes   Discharge diagnosis  Stage 3 chronic kidney disease Precordial painCoronary artery disease involving native coronary artery of native heart with angina pectoris    Does the patient have one of the following disease processes/diagnoses(primary or secondary)?  CHF   Does the patient have Home health ordered?  No   Is there a DME ordered?  No   Medication alerts for this patient  ASA and Brilinta    General alerts for this patient  Stent placed    Prep survey completed?  Yes          Chuyita Reeves RN

## 2019-01-28 NOTE — OUTREACH NOTE
CHF Week 1 Survey      Responses   Facility patient discharged from?  [facility]   Does the patient have one of the following disease processes/diagnoses(primary or secondary)?  CHF   Is there a successful TCM telephone encounter documented?  No   CHF Week 1 attempt successful?  No   Unsuccessful attempts  Attempt 1          Yumiko Rosales, RN

## 2019-01-29 NOTE — OUTREACH NOTE
CHF Week 1 Survey      Responses   Facility patient discharged from?  [facility]   Does the patient have one of the following disease processes/diagnoses(primary or secondary)?  CHF   Is there a successful TCM telephone encounter documented?  No   CHF Week 1 attempt successful?  No   Unsuccessful attempts  Attempt 2          Hannah Mason RN

## 2019-01-30 NOTE — TELEPHONE ENCOUNTER
Patients wife called and said patient has been bleeding from his gums since he got home from the hospital. Please advise.

## 2019-01-30 NOTE — TELEPHONE ENCOUNTER
Patient's wife left a message stating that her  has been experiencing bleeding from his gums since his heart cath on Friday. He is s/p cath w/ placement of FILIBERTO on 1/25/19. Patient currently takes Brilinta BID, ASA, and Xarelto daily. Discussed with Dr. Weinstein. Dr. Weinstein's recommendations are to stop the Xarelto at this time to see if the bleeding subsides. Patient and wife were advised that patient should not stop taking Brilinta or ASA for any reason d/t risk of clotting to stent. Informed patient's wife that he should stop taking Xarelto, and to contact our office next week to see how patient is doing. She was also advised to contact our office if the bleeding worsens, or does not improve. Patient's wife verbalized understanding of all instructions. Denies any further questions or concerns at this time.

## 2019-02-04 NOTE — OUTREACH NOTE
CHF Week 2 Survey      Responses   Facility patient discharged from?  [facility]   Does the patient have one of the following disease processes/diagnoses(primary or secondary)?  CHF   Week 2 attempt successful?  Yes   Call start time  1021   Call end time  1024   General alerts for this patient  Stent placed    Discharge diagnosis  Stage 3 chronic kidney disease Precordial painCoronary artery disease involving native coronary artery of native heart with angina pectoris    Is patient permission given to speak with other caregiver?  Yes   List who call center can speak with  wife - Smitha    Medication alerts for this patient  ASA and Brilinta    Meds reviewed with patient/caregiver?  Yes   Is the patient having any side effects they believe may be caused by any medication additions or changes?  No   Does the patient have all medications ordered at discharge?  Yes   Is the patient taking all medications as directed (includes completed medication regime)?  Yes   Does the patient have a primary care provider?   Yes   Comments regarding PCP  GEORGINA Latham has an offfice visit today 02/04/19   Has the patient kept scheduled appointments due by today?  Yes   Did the patient receive a copy of their discharge instructions?  Yes   Nursing interventions  Reviewed instructions with patient, Educated on MyChart   What is the patient's perception of their health status since discharge?  Improving   Is the patient weighing daily?  Yes   Does the patient have scales?  Yes   Daily weight interventions  Education provided on importance of daily weight   Is the patient able to teach back Heart Failure diet management?  Yes   Is the patient able to teach back Heart Failure Zones?  Yes [green]   Is the patient able to teach back signs and symptoms of worsening condition? (i.e. weight gain, shortness of air, etc.)  Yes   Is the patient/caregiver able to teach back the hierarchy of who to call/visit for symptoms/problems? PCP, Specialist, Home  health nurse, Urgent Care, ED, 911  Yes   CHF Week 2 call completed?  Yes          Deisy Le RN

## 2019-02-11 NOTE — TELEPHONE ENCOUNTER
Patients wife called and said when patient coughs he has some blood mixed in with the phlegm. She said it is less than a teaspoon. He has not had any chest pain and does not have any bleeding anywhere else.  Please advise.

## 2019-02-11 NOTE — TELEPHONE ENCOUNTER
Called wife to discuss. She states that this started 3 or 4 days ago and is happening 3-4 times per day. I advised her that they should continue to monitor at this time. They were advised to contact our office if this continues to worsen, or if he notices large amounts of blood in sputum, urine or stools. Wife verbalized understanding.

## 2019-02-12 NOTE — OUTREACH NOTE
CHF Week 3 Survey      Responses   Facility patient discharged from?  [place]   Does the patient have one of the following disease processes/diagnoses(primary or secondary)?  CHF   Week 3 attempt successful?  Yes   Call start time  1235   Call end time  1237   Meds reviewed with patient/caregiver?  Yes   Is the patient taking all medications as directed (includes completed medication regime)?  Yes   Has the patient kept scheduled appointments due by today?  Yes   Comments  cardiac rehad now 2 days a week   What is the patient's perception of their health status since discharge?  Improving   Is the patient weighing daily?  Yes   Is the patient able to teach back Heart Failure Zones?  Yes [green zone]   CHF Week 3 call completed?  Yes   Revoked  No further contact(revokes)-requires comment   Graduated/Revoked comments  no new issues, going to cardiac rehab and feeling welll          Daysi Cordoba RN

## 2019-03-12 NOTE — TELEPHONE ENCOUNTER
Abril from Dr. Bah is inquiring about teeth cleaning for this pt. They want to know if pt can do this since he has been having issue with bleeding gums. They need a letter fax to their office. Pt is taking Brilinta.   Fax  [phone]

## 2019-03-12 NOTE — TELEPHONE ENCOUNTER
Unfortunately, this patient just had a drug-eluting stent placed in January 2019.  Therefore, it is unsafe for him to stop aspirin or Brilinta for a 12-month period afterwards.  If they can clean his teeth on both of these medications, that would be okay with me.  However, it is not considered safe to stop either medication at this time.

## 2019-06-04 PROBLEM — Z95.1 HX OF CABG: Status: ACTIVE | Noted: 2019-01-01

## 2019-06-04 NOTE — PROGRESS NOTES
Subjective:     Encounter Date:06/04/2019      Patient ID: Ha Agrawal is a 79 y.o. male. He presents today for routine follow up. He has a history of coronary artery disease s/p coronary artery bypass grafting and coronary artery stenting, chronic diastolic congestive heart failure, chronic atrial fibrillation on chronic anticoagulation, hypertension, hyperlipidemia, stage III chronic kidney disease, type II diabetes mellitus and obesity. He complains of a 1-2 week history of exertional, substernal chest pressure. The pressure does not radiate and is relieved with rest. He continues to complain of dyspnea with exertion and fatigue that is increasing in severity. He denies palpitations, dizziness, syncope, orthopnea, PND or swelling. He reports that blood pressure and cholesterol are well controlled. He denies any signs of bleeding. He states that overall he still does not feel well.     Chief Complaint: routine follow up  Coronary Artery Disease   Presents for follow-up visit. Symptoms include chest pain. Pertinent negatives include no chest pressure, chest tightness, dizziness, leg swelling, muscle weakness, palpitations, shortness of breath or weight gain. Risk factors include hyperlipidemia. Risk factors do not include hypertension. His past medical history is significant for CHF. The symptoms have been stable. Compliance with diet is variable. Compliance with exercise is variable. Compliance with medications is good.   Congestive Heart Failure   Presents for follow-up visit. Associated symptoms include chest pain. Pertinent negatives include no abdominal pain, chest pressure, claudication, edema, fatigue, muscle weakness, near-syncope, nocturia, orthopnea, palpitations, paroxysmal nocturnal dyspnea, shortness of breath or unexpected weight change. The symptoms have been stable. His past medical history is significant for CAD. Compliance with total regimen is 51-75%. Compliance with diet is 51-75%.  Compliance with exercise is 51-75%. Compliance with medications is %.   Atrial Fibrillation   Presents for follow-up visit. Symptoms include chest pain. Symptoms are negative for an AICD problem, bradycardia, dizziness, hemodynamic instability, hypertension, hypotension, pacemaker problem, palpitations, shortness of breath, syncope, tachycardia and weakness. The symptoms have been stable. Past medical history includes atrial fibrillation, CAD, CHF and hyperlipidemia.   Hypertension   This is a chronic problem. The current episode started more than 1 year ago. The problem is controlled. Associated symptoms include chest pain and malaise/fatigue. Pertinent negatives include no anxiety, blurred vision, headaches, orthopnea, palpitations, peripheral edema, PND, shortness of breath or sweats. Current antihypertension treatment includes beta blockers. The current treatment provides significant improvement. Hypertensive end-organ damage includes kidney disease, CAD/MI and heart failure.   Hyperlipidemia   This is a chronic problem. The current episode started more than 1 year ago. The problem is controlled. Recent lipid tests were reviewed and are normal. Associated symptoms include chest pain. Pertinent negatives include no shortness of breath. Current antihyperlipidemic treatment includes statins. The current treatment provides significant improvement of lipids. Risk factors for coronary artery disease include hypertension, male sex, obesity, diabetes mellitus and dyslipidemia.       The following portions of the patient's history were reviewed and updated as appropriate: allergies, current medications, past family history, past medical history, past social history, past surgical history and problem list.     Allergies   Allergen Reactions   • Adhesive Tape Dermatitis   • Penicillins Rash     Current outpatient and discharge medications have been reconciled for the patient.  CLARIBEL Yu    Current  Outpatient Medications:   •  amiodarone (PACERONE) 200 MG tablet, Take 200 mg by mouth Daily., Disp: , Rfl:   •  aspirin 81 MG chewable tablet, Chew 1 tablet Daily., Disp: , Rfl:   •  atorvastatin (LIPITOR) 40 MG tablet, Take 1 tablet by mouth Every Night., Disp: 30 tablet, Rfl: 2  •  azelastine (ASTELIN) 0.1 % nasal spray, 2 sprays into the nostril(s) as directed by provider 2 (Two) Times a Day As Needed for Rhinitis or Allergies. Use in each nostril as directed, Disp: , Rfl:   •  diazepam (VALIUM) 5 MG tablet, Take 5 mg by mouth Daily As Needed for Sleep., Disp: , Rfl:   •  fenofibrate (TRICOR) 145 MG tablet, Take 145 mg by mouth daily., Disp: , Rfl:   •  furosemide (LASIX) 40 MG tablet, Take 40 mg by mouth daily., Disp: , Rfl:   •  Insulin Glargine (BASAGLAR KWIKPEN) 100 UNIT/ML injection pen, Inject 80 Units under the skin into the appropriate area as directed Every Night., Disp: , Rfl:   •  insulin regular (NOVOLIN R) 100 UNIT/ML injection, Inject  under the skin into the appropriate area as directed Daily. BG - 100 divided by 20., Disp: , Rfl:   •  isosorbide mononitrate (IMDUR) 60 MG 24 hr tablet, Take 60 mg by mouth Daily., Disp: , Rfl:   •  lisinopril (PRINIVIL,ZESTRIL) 5 MG tablet, Take 5 mg by mouth Daily., Disp: , Rfl:   •  metoprolol succinate XL (TOPROL-XL) 25 MG 24 hr tablet, Take 75 mg by mouth Daily., Disp: , Rfl:   •  mometasone (ELOCON) 0.1 % lotion, Apply 1 application topically to the appropriate area as directed 2 (Two) Times a Day., Disp: , Rfl:   •  Multiple Vitamins-Minerals (MULTIVITAMIN ADULT PO), Take 1 tablet by mouth Daily., Disp: , Rfl:   •  nitroglycerin (NITROSTAT) 0.4 MG SL tablet, Place 0.4 mg under the tongue Every 5 (Five) Minutes As Needed for Chest Pain. Take no more than 3 doses in 15 minutes., Disp: , Rfl:   •  Omega-3 Fatty Acids (FISH OIL) 1200 MG capsule delayed-release, Take 1,200 mg by mouth 2 (two) times a day., Disp: , Rfl:   •  pantoprazole (PROTONIX) 20 MG EC tablet,  Take 20 mg by mouth Daily., Disp: , Rfl:   •  potassium chloride (K-DUR,KLOR-CON) 10 MEQ CR tablet, Take 10 mEq by mouth daily., Disp: , Rfl:   •  ranolazine (RANEXA) 500 MG 12 hr tablet, Take 1 tablet by mouth 2 (two) times a day., Disp: 60 tablet, Rfl: 11  •  rivaroxaban (XARELTO) 15 MG tablet, Take 15 mg by mouth Daily., Disp: , Rfl:   •  sertraline (ZOLOFT) 25 MG tablet, Take 25 mg by mouth daily., Disp: , Rfl:   •  sodium bicarbonate 650 MG tablet, Take 650 mg by mouth 2 (Two) Times a Day., Disp: , Rfl:   •  tamsulosin (FLOMAX) 0.4 MG capsule 24 hr capsule, Take 1 capsule by mouth daily., Disp: , Rfl:   •  ticagrelor (BRILINTA) 90 MG tablet tablet, Take 1 tablet by mouth 2 (Two) Times a Day., Disp: 60 tablet, Rfl: 11  •  vitamin D (ERGOCALCIFEROL) 32565 UNITS capsule capsule, Take 50,000 Units by mouth 1 (One) Time Per Week. Monday., Disp: , Rfl:   •  vitamin E 1000 UNIT capsule, Take 1,000 Units by mouth daily., Disp: , Rfl:   Past Medical History:   Diagnosis Date   • Angina pectoris (CMS/HCC)    • Aortocoronary bypass status    • Atrial fibrillation (CMS/HCC)    • CAD (coronary artery disease), native coronary artery     : CABG and PCI   • Chest pain on exertion    • Chest pain, exertional    • Chronic kidney disease    • Coronary angioplasty status    • Coronary artery disease involving native coronary artery of native heart without angina pectoris 3/10/2017   • Diabetes mellitus (CMS/HCC)    • Essential hypertension    • Hx of CABG 1/11/2018   • Hyperlipidemia    • Hypertension    • Mixed hyperlipidemia    • Paroxysmal atrial fibrillation (CMS/HCC)    • Shortness of breath    • Tobacco use      Past Surgical History:   Procedure Laterality Date   • CARDIAC CATHETERIZATION     • CARDIAC CATHETERIZATION N/A 1/25/2019    Procedure: Left Heart Cath, coronaries, grafts, possible;  Surgeon: Gabriele Weinstein MD;  Location: [facility] CATH INVASIVE LOCATION;  Service: Cardiology   • CATARACT EXTRACTION     •  CORONARY ARTERY BYPASS GRAFT     • CORONARY STENT PLACEMENT  03/2015     Family History   Problem Relation Age of Onset   • Heart attack Mother    • Heart disease Mother    • Heart attack Father    • Heart disease Father    • Heart disease Brother    • Heart disease Maternal Grandmother    • Heart attack Maternal Grandmother    • Heart disease Maternal Grandfather    • Heart attack Maternal Grandfather    • Heart disease Paternal Grandmother    • Heart attack Paternal Grandmother    • Heart disease Paternal Grandfather    • Heart attack Paternal Grandfather    • Alzheimer's disease Sister      Social History     Socioeconomic History   • Marital status:      Spouse name: Not on file   • Number of children: Not on file   • Years of education: Not on file   • Highest education level: Not on file   Tobacco Use   • Smoking status: Former Smoker     Types: Pipe     Last attempt to quit: 2004     Years since quitting: 15.4   • Smokeless tobacco: Never Used   • Tobacco comment: QUIT AT AGE 63   Substance and Sexual Activity   • Alcohol use: Yes     Comment: Socially   • Drug use: No   • Sexual activity: Defer         Review of Systems   Constitution: Positive for malaise/fatigue. Negative for chills, decreased appetite, fatigue, fever, weakness, night sweats, unexpected weight change, weight gain and weight loss.   HENT: Negative for nosebleeds.    Eyes: Negative for blurred vision and visual disturbance.   Cardiovascular: Positive for chest pain and dyspnea on exertion. Negative for claudication, leg swelling, near-syncope, orthopnea, palpitations, paroxysmal nocturnal dyspnea and syncope.   Respiratory: Negative for chest tightness, cough, hemoptysis, shortness of breath, snoring and wheezing.    Endocrine: Negative for cold intolerance and heat intolerance.   Hematologic/Lymphatic: Does not bruise/bleed easily.   Skin: Negative for rash.   Musculoskeletal: Negative for back pain, falls and muscle weakness.  "  Gastrointestinal: Negative for abdominal pain, change in bowel habit, constipation, diarrhea, dysphagia, heartburn, nausea and vomiting.   Genitourinary: Negative for hematuria and nocturia.   Neurological: Negative for dizziness, headaches and light-headedness.   Psychiatric/Behavioral: Negative for altered mental status.   Allergic/Immunologic: Negative for persistent infections.         ECG 12 Lead  Date/Time: 6/4/2019 11:24 AM  Performed by: Rocio Andrew APRN  Authorized by: Rocio Andrew APRN   Comparison: compared with previous ECG from 1/25/2019  Similar to previous ECG  Rhythm: atrial fibrillation  Rate: normal  BPM: 70  T inversion: II, III, aVF, V4, V5 and V6  QRS axis: right    Clinical impression: abnormal EKG          /68   Pulse 70   Ht 188 cm (74\")   Wt 119 kg (263 lb)   SpO2 98%   BMI 33.77 kg/m²        Objective:     Physical Exam   Constitutional: He is oriented to person, place, and time. Vital signs are normal. He appears well-developed and well-nourished. No distress.   HENT:   Head: Normocephalic and atraumatic.   Right Ear: External ear normal.   Left Ear: External ear normal.   Eyes: Conjunctivae are normal. Pupils are equal, round, and reactive to light. Right eye exhibits no discharge. Left eye exhibits no discharge.   Neck: Normal range of motion. Neck supple. No JVD present. Carotid bruit is not present. No thyromegaly present.   Cardiovascular: Normal rate, normal heart sounds and intact distal pulses. An irregularly irregular rhythm present. PMI is not displaced. Exam reveals no gallop, no friction rub and no decreased pulses.   No murmur heard.  Pulses:       Radial pulses are 2+ on the right side, and 2+ on the left side.        Dorsalis pedis pulses are 2+ on the right side, and 2+ on the left side.        Posterior tibial pulses are 2+ on the right side, and 2+ on the left side.   Pulmonary/Chest: Effort normal. No respiratory distress. He has no decreased breath " sounds. He has no wheezes. He has no rhonchi. He has rales in the right lower field and the left lower field. He exhibits no tenderness.   Abdominal: Soft. Bowel sounds are normal. He exhibits no distension. There is no tenderness.   Musculoskeletal: Normal range of motion. He exhibits no edema.   Neurological: He is alert and oriented to person, place, and time.   Skin: Skin is warm and dry. No rash noted. He is not diaphoretic. No erythema. No pallor.   Psychiatric: He has a normal mood and affect. His behavior is normal. Judgment and thought content normal.   Vitals reviewed.      Lab Review:   Lab Results   Component Value Date    WBC 6.30 01/26/2019    HGB 13.6 (L) 01/26/2019    HCT 40.6 01/26/2019    .7 (H) 01/26/2019     01/26/2019     Lab Results   Component Value Date    GLUCOSE 111 (H) 01/26/2019    BUN 22 (H) 01/26/2019    CREATININE 1.35 01/26/2019    EGFRIFNONA 51 (L) 01/26/2019    BCR 16.3 01/26/2019    K 4.3 01/26/2019    CO2 24.0 01/26/2019    CALCIUM 10.2 01/26/2019    ALBUMIN 4.20 01/23/2019    AST 30 01/23/2019    ALT 16 01/23/2019     Lab Results   Component Value Date    CHLPL 134 04/28/2015    CHLPL 159 02/28/2015     Lab Results   Component Value Date    TRIG 250 (H) 04/28/2015    TRIG 418 (H) 02/28/2015     Lab Results   Component Value Date    HDL 27 04/28/2015    HDL 26 02/28/2015     Lab Results   Component Value Date    LDL 66 04/28/2015    LDL 83 02/28/2015           Assessment:          Diagnosis Plan   1. Coronary artery disease involving native coronary artery of native heart with unstable angina pectoris  Increase Imdur to 120 mg daily. Increase Ranexa to 1000 mg twice daily.    2. Hx of CABG     3. History of coronary artery stent placement  3.0 X 12 mm Xience drug-eluting stent to the saphenous vein graft to the diagonal branch. Remains on aspirin and brilinta. Denies bleeding.    4. Essential hypertension  Well controlled.    5. Mixed hyperlipidemia  Well controlled.     6. Chronic atrial fibrillation  Rate controlled and anticoagulated.   7. Chronic anticoagulation  On xarelto. Denies signs of bleeding.    8. Stage 3 chronic kidney disease     9. Type 2 diabetes mellitus with stage 3 chronic kidney disease, with long-term current use of insulin  Managed by PCP.    10.  Chronic diastolic congestive heart failure   Compensated.           Plan:       1. Increase Imdur to 120 mg daily. Increase Ranexa to 1000 mg twice daily. Continue other medications as previously prescribed.  2. Report any worsening symptoms.  3. Report any signs of bleeding.  4. Continue heart healthy diet and regular exercise as tolerated.   5. Follow up with PCP for blood pressure and cholesterol management and routine lab work.  6. Follow up with mid-level provider in two weeks, or sooner if needed. If symptoms have not improved, consider nuclear stress test per Dr. Weinstein.   7. Reviewed signs and symptoms of CHF and what to report with the patient. Patient instructed to restrict sodium and weigh daily. Report weight gain of greater than 2 lbs overnight or 5 lbs in 1 week. Pt verbalized understanding of instructions and plan of care.

## 2019-06-09 PROBLEM — R07.9 CHEST PAIN: Status: ACTIVE | Noted: 2019-01-01

## 2019-06-10 PROBLEM — R79.89 ELEVATED TSH: Status: ACTIVE | Noted: 2019-01-01

## 2019-06-10 PROBLEM — D64.9 ANEMIA: Status: ACTIVE | Noted: 2019-01-01

## 2019-06-10 NOTE — ED PROVIDER NOTES
Subjective   Patient is a 79-year-old gentleman with a long history of coronary artery disease congestive heart failure diabetes hypertension.  He presents with a 2-hour history of substernal chest pain radiating to his left arm with some shortness of breath.  He denies any diaphoresis nausea or vomiting.  No fever no new cough or change in bowel or bladder habits.            Review of Systems   Respiratory: Positive for shortness of breath.    Cardiovascular: Positive for chest pain.   All other systems reviewed and are negative.      Past Medical History:   Diagnosis Date   • Angina pectoris (CMS/HCC)    • Aortocoronary bypass status    • Atrial fibrillation (CMS/HCC)    • CAD (coronary artery disease), native coronary artery     : CABG and PCI   • Chest pain on exertion    • Chest pain, exertional    • Chronic kidney disease    • Coronary angioplasty status    • Coronary artery disease involving native coronary artery of native heart without angina pectoris 3/10/2017   • Diabetes mellitus (CMS/HCC)    • Essential hypertension    • Hx of CABG 1/11/2018   • Hyperlipidemia    • Hypertension    • Mixed hyperlipidemia    • Paroxysmal atrial fibrillation (CMS/HCC)    • Shortness of breath    • Tobacco use        Allergies   Allergen Reactions   • Adhesive Tape Dermatitis   • Penicillins Rash       Past Surgical History:   Procedure Laterality Date   • CARDIAC CATHETERIZATION     • CARDIAC CATHETERIZATION N/A 1/25/2019    Procedure: Left Heart Cath, coronaries, grafts, possible;  Surgeon: Gabriele Weinstein MD;  Location: [facility] CATH INVASIVE LOCATION;  Service: Cardiology   • CATARACT EXTRACTION     • CORONARY ARTERY BYPASS GRAFT     • CORONARY STENT PLACEMENT  03/2015       Family History   Problem Relation Age of Onset   • Heart attack Mother    • Heart disease Mother    • Heart attack Father    • Heart disease Father    • Heart disease Brother    • Heart disease Maternal Grandmother    • Heart attack Maternal  Grandmother    • Heart disease Maternal Grandfather    • Heart attack Maternal Grandfather    • Heart disease Paternal Grandmother    • Heart attack Paternal Grandmother    • Heart disease Paternal Grandfather    • Heart attack Paternal Grandfather    • Alzheimer's disease Sister        Social History     Socioeconomic History   • Marital status:      Spouse name: Not on file   • Number of children: Not on file   • Years of education: Not on file   • Highest education level: Not on file   Tobacco Use   • Smoking status: Former Smoker     Types: Pipe     Last attempt to quit: 2004     Years since quitting: 15.4   • Smokeless tobacco: Never Used   • Tobacco comment: QUIT AT AGE 63   Substance and Sexual Activity   • Alcohol use: Yes     Comment: Socially   • Drug use: No   • Sexual activity: Defer           Objective   Physical Exam   Constitutional: He appears well-developed and well-nourished.   HENT:   Head: Normocephalic and atraumatic.   Eyes: EOM are normal.   Neck: Normal range of motion. Neck supple.   Cardiovascular: Normal rate, intact distal pulses and normal pulses. An irregularly irregular rhythm present.   Pulmonary/Chest: Effort normal and breath sounds normal.   Musculoskeletal:        Right lower leg: He exhibits edema.        Left lower leg: He exhibits edema.   Skin: Skin is warm and dry.   Psychiatric: He has a normal mood and affect.   Nursing note and vitals reviewed.      Procedures           ED Course                  MDM  Number of Diagnoses or Management Options     Amount and/or Complexity of Data Reviewed  Clinical lab tests: ordered and reviewed  Tests in the radiology section of CPT®: ordered and reviewed    Critical Care  Total time providing critical care: < 30 minutes    Patient Progress  Patient progress: stable    Patient is a profoundly high risk patient with typical symptoms.  Initial troponin is negative but will admit for rule out and possible cardiology consult patient is  stable in the ER    Final diagnoses:   Chest pain, unspecified type   Anemia, unspecified type            Alexander Rodriguez MD  06/09/19 4014

## 2019-06-10 NOTE — PLAN OF CARE
Problem: Patient Care Overview  Goal: Plan of Care Review  Outcome: Ongoing (interventions implemented as appropriate)   06/10/19 7970   Coping/Psychosocial   Plan of Care Reviewed With patient   Plan of Care Review   Progress no change   OTHER   Outcome Summary Negative stress test today. No c/o pain. C/o SOA with exertion. O2 sats WNL on 2L NC. VSS. AF 61-86 on tele. Continue to monitor.

## 2019-06-10 NOTE — H&P
"    [facility] Medicine Services  HISTORY AND PHYSICAL    Date of Admission: 6/9/2019  Primary Care Physician: Kevin Latham APRN    Subjective     Chief Complaint: Chest pain    History of Present Illness  79-year-old  male who presents emergency department with complaints of chest pain.  He was here on Tuesday, and saw his cardiologist.\"  They doubled my medication\".  He has a follow-up with cardiology on the 14th of this month.  Patient presented today with a history of substernal left chest pain that radiated into his left arm.  The pain was described as a pressure ongoing for 2 hours.  He had no diaphoresis no nausea.  He has this lingering shortness of breath.  He has chronic pitting lower extremity edema.  He has no home oxygen use.  His hemoglobin previously was 12 and it has dropped to 9.2.  Per ER, the patient was negative occult blood.  First set of troponin in the emergency department was negative.        Review of Systems   Chest pain  The patient states that he has nosebleeds, but when investigated he has mild bleeding in his nose when he blows his nose.  The patient states that he has hemoptysis, but under investigation again that he has some streaky sputum occasionally when he coughs  Chronic lower extremity edema  Dyspnea    Otherwise complete ROS reviewed and negative except as mentioned in the HPI.    Past Medical History:   Past Medical History:   Diagnosis Date   • Angina pectoris (CMS/HCC)    • Aortocoronary bypass status    • Atrial fibrillation (CMS/HCC)    • CAD (coronary artery disease), native coronary artery     : CABG and PCI   • Chest pain on exertion    • Chest pain, exertional    • Chronic kidney disease    • Coronary angioplasty status    • Coronary artery disease involving native coronary artery of native heart without angina pectoris 3/10/2017   • Diabetes mellitus (CMS/HCC)    • Essential hypertension    • Hx of CABG 1/11/2018   • " Hyperlipidemia    • Hypertension    • Mixed hyperlipidemia    • Paroxysmal atrial fibrillation (CMS/HCC)    • Shortness of breath    • Tobacco use      Past Surgical History:  Past Surgical History:   Procedure Laterality Date   • CARDIAC CATHETERIZATION     • CARDIAC CATHETERIZATION N/A 1/25/2019    Procedure: Left Heart Cath, coronaries, grafts, possible;  Surgeon: Gabriele Weinstein MD;  Location: [facility] CATH INVASIVE LOCATION;  Service: Cardiology   • CATARACT EXTRACTION     • CORONARY ARTERY BYPASS GRAFT     • CORONARY STENT PLACEMENT  03/2015     Social History:  reports that he quit smoking about 15 years ago. His smoking use included pipe. He has never used smokeless tobacco. He reports that he drinks alcohol. He reports that he does not use drugs.    Family History: family history includes Alzheimer's disease in his sister; Heart attack in his father, maternal grandfather, maternal grandmother, mother, paternal grandfather, and paternal grandmother; Heart disease in his brother, father, maternal grandfather, maternal grandmother, mother, paternal grandfather, and paternal grandmother.       Allergies:  Allergies   Allergen Reactions   • Adhesive Tape Dermatitis   • Penicillins Rash     Medications:  Prior to Admission medications    Medication Sig Start Date End Date Taking? Authorizing Provider   amiodarone (PACERONE) 200 MG tablet Take 200 mg by mouth Daily.    Provider, MD Sekou   aspirin 81 MG chewable tablet Chew 1 tablet Daily. 1/27/19   Deisy Dacosta APRN   atorvastatin (LIPITOR) 40 MG tablet Take 1 tablet by mouth Every Night. 1/26/19   Ricardo Andrade DO   azelastine (ASTELIN) 0.1 % nasal spray 2 sprays into the nostril(s) as directed by provider 2 (Two) Times a Day As Needed for Rhinitis or Allergies. Use in each nostril as directed    ProviderSekou MD   diazepam (VALIUM) 5 MG tablet Take 5 mg by mouth Daily As Needed for Sleep.    ProviderSekou MD   fenofibrate  (TRICOR) 145 MG tablet Take 145 mg by mouth daily.    Sekou Hurtado MD   furosemide (LASIX) 40 MG tablet Take 40 mg by mouth daily.    Sekou Hurtado MD   Insulin Glargine (BASAGLAR KWIKPEN) 100 UNIT/ML injection pen Inject 80 Units under the skin into the appropriate area as directed Every Night.    Sekou Hurtado MD   insulin regular (NOVOLIN R) 100 UNIT/ML injection Inject  under the skin into the appropriate area as directed Daily. BG - 100 divided by 20.    Sekuo Hurtado MD   isosorbide mononitrate (IMDUR) 120 MG 24 hr tablet Take 1 tablet by mouth Daily. 6/4/19   Rocio Andrew APRN   lisinopril (PRINIVIL,ZESTRIL) 5 MG tablet Take 5 mg by mouth Daily.    Sekou Hurtado MD   metoprolol succinate XL (TOPROL-XL) 25 MG 24 hr tablet Take 75 mg by mouth Daily.    Sekou Hurtado MD   Multiple Vitamins-Minerals (MULTIVITAMIN ADULT PO) Take 1 tablet by mouth Daily.    Sekou Hurtado MD   nitroglycerin (NITROSTAT) 0.4 MG SL tablet Place 0.4 mg under the tongue Every 5 (Five) Minutes As Needed for Chest Pain. Take no more than 3 doses in 15 minutes.    Sekou Hurtado MD   Omega-3 Fatty Acids (FISH OIL) 1200 MG capsule delayed-release Take 1,200 mg by mouth 2 (two) times a day.    Sekou Hurtado MD   pantoprazole (PROTONIX) 20 MG EC tablet Take 20 mg by mouth Daily.    Sekou Hurtado MD   potassium chloride (K-DUR,KLOR-CON) 10 MEQ CR tablet Take 10 mEq by mouth daily.    Sekou Hurtado MD   ranolazine (RANEXA) 1000 MG 12 hr tablet Take 1 tablet by mouth 2 (Two) Times a Day. 6/4/19   Rocio Andrew APRN   rivaroxaban (XARELTO) 15 MG tablet Take 15 mg by mouth Daily.    Sekou Hurtado MD   sertraline (ZOLOFT) 25 MG tablet Take 25 mg by mouth daily.    Sekou Hurtado MD   sodium bicarbonate 650 MG tablet Take 650 mg by mouth 2 (Two) Times a Day.    Sekou Hurtado MD   tamsulosin (FLOMAX) 0.4 MG capsule 24 hr capsule Take 1  "capsule by mouth daily.    Sekou Hurtado MD   ticagrelor (BRILINTA) 90 MG tablet tablet Take 1 tablet by mouth 2 (Two) Times a Day. 1/26/19   Knees, Deisy E, APRN   vitamin D (ERGOCALCIFEROL) 96826 UNITS capsule capsule Take 50,000 Units by mouth 1 (One) Time Per Week. Monday.    Sekou Hurtado MD   vitamin E 1000 UNIT capsule Take 1,000 Units by mouth daily.    Sekou Hurtado MD   mometasone (ELOCON) 0.1 % lotion Apply 1 application topically to the appropriate area as directed 2 (Two) Times a Day.  6/9/19  Sekou Hurtado MD     Objective     Vital Signs: /62 (BP Location: Right arm, Patient Position: Lying)   Pulse 78   Temp 97.7 °F (36.5 °C) (Oral)   Resp 21   Ht 185.4 cm (72.99\")   Wt 118 kg (261 lb 1 oz)   SpO2 99%   BMI 34.45 kg/m²   Physical Exam   HENT:   Head: Normocephalic and atraumatic.   Nose: Nose normal.   Mouth/Throat: Oropharynx is clear and moist.   Eyes: Conjunctivae and EOM are normal.   Neck: Normal range of motion. Neck supple.   Cardiovascular: Normal rate, regular rhythm and normal heart sounds.   Pulmonary/Chest: Effort normal and breath sounds normal.   Abdominal: Soft. Bowel sounds are normal.   Central obesity   Musculoskeletal: He exhibits edema. He exhibits no tenderness.   +2 pitting bilateral lower extremities   Neurological: He is alert. No cranial nerve deficit.   Skin: Skin is warm and dry.   Psychiatric: He has a normal mood and affect.   Vitals reviewed.          Results Reviewed:  Lab Results (last 24 hours)     Procedure Component Value Units Date/Time    BNP [[id number]]  (Normal) Collected:  06/09/19 1950    Specimen:  Blood Updated:  06/09/19 2123     proBNP 984.0 pg/mL     [place] Draw [[id number]] Collected:  06/09/19 1950    Specimen:  Blood Updated:  06/09/19 2101    Narrative:       The following orders were created for panel order [place] Draw.  Procedure                               Abnormality         Status                   "   ---------                               -----------         ------                     Light Blue Top[[id number]]                                   Final result               Green Top (Gel)[[id number]]                                  Final result               Lavender Top[[id number]]                                     Final result               Red Top[[id number]]                                          Final result                 Please view results for these tests on the individual orders.    Light Blue Top [[id number]] Collected:  06/09/19 1950    Specimen:  Blood Updated:  06/09/19 2101     Extra Tube hold for add-on     Comment: Auto resulted       Green Top (Gel) [[id number]] Collected:  06/09/19 1950    Specimen:  Blood Updated:  06/09/19 2101     Extra Tube Hold for add-ons.     Comment: Auto resulted.       Lavender Top [[id number]] Collected:  06/09/19 1950    Specimen:  Blood Updated:  06/09/19 2101     Extra Tube hold for add-on     Comment: Auto resulted       Red Top [[id number]] Collected:  06/09/19 1950    Specimen:  Blood Updated:  06/09/19 2101     Extra Tube Hold for add-ons.     Comment: Auto resulted.       POC Occult Blood Stool [[id number]]  (Normal) Collected:  06/09/19 2029    Specimen:  Stool Updated:  06/09/19 2030     Fecal Occult Blood Negative     Lot Number [id number]     Expiration Date 2/29/2020     DEVELOPER LOT NUMBER [id number]     DEVELOPER EXPIRATION DATE 2/29/2020     Positive Control Positive     Negative Control Negative    Troponin [[id number]]  (Normal) Collected:  06/09/19 1950    Specimen:  Blood Updated:  06/09/19 2029     Troponin I <0.012 ng/mL     Comprehensive Metabolic Panel [[id number]]  (Abnormal) Collected:  06/09/19 1950    Specimen:  Blood Updated:  06/09/19 2018     Glucose 137 mg/dL      BUN 30 mg/dL      Creatinine 1.65 mg/dL      Sodium 137 mmol/L      Potassium 4.3 mmol/L      Chloride 103 mmol/L      CO2 24.0 mmol/L      Calcium 9.7 mg/dL      Total Protein 7.7 g/dL       Albumin 4.30 g/dL      ALT (SGPT) 21 U/L      AST (SGOT) 47 U/L      Alkaline Phosphatase 65 U/L      Total Bilirubin 0.5 mg/dL      eGFR Non African Amer 41 mL/min/1.73      Globulin 3.4 gm/dL      A/G Ratio 1.3 g/dL      BUN/Creatinine Ratio 18.2     Anion Gap 10.0 mmol/L     Narrative:       GFR Normal >60  Chronic Kidney Disease <60  Kidney Failure <15    CBC & Differential [[id number]] Collected:  06/09/19 1950    Specimen:  Blood Updated:  06/09/19 2009    Narrative:       The following orders were created for panel order CBC & Differential.  Procedure                               Abnormality         Status                     ---------                               -----------         ------                     CBC Auto Differential[[id number]]        Abnormal            Final result                 Please view results for these tests on the individual orders.    CBC Auto Differential [[id number]]  (Abnormal) Collected:  06/09/19 1950    Specimen:  Blood Updated:  06/09/19 2009     WBC 5.26 10*3/mm3      RBC 2.95 10*6/mm3      Hemoglobin 9.2 g/dL      Hematocrit 28.0 %      MCV 94.9 fL      MCH 31.2 pg      MCHC 32.9 g/dL      RDW 15.2 %      RDW-SD 52.7 fl      MPV 9.7 fL      Platelets 240 10*3/mm3      Neutrophil % 59.6 %      Lymphocyte % 23.2 %      Monocyte % 13.3 %      Eosinophil % 2.5 %      Basophil % 0.8 %      Immature Grans % 0.6 %      Neutrophils, Absolute 3.14 10*3/mm3      Lymphocytes, Absolute 1.22 10*3/mm3      Monocytes, Absolute 0.70 10*3/mm3      Eosinophils, Absolute 0.13 10*3/mm3      Basophils, Absolute 0.04 10*3/mm3      Immature Grans, Absolute 0.03 10*3/mm3      nRBC 0.0 /100 WBC         Imaging Results (last 24 hours)     Procedure Component Value Units Date/Time    XR Chest 1 View [[id number]] Collected:  06/09/19 2005     Updated:  06/09/19 2009    Narrative:       EXAMINATION: XR CHEST 1 VW-     6/9/2019 7:57 PM CDT     HISTORY: Chest Pain Triage Protocol     1 view chest x-ray  compared with 01/23/2019.     Low lung volumes with chronic lung changes. Prominence of parenchymal  markings though overall slightly better expansion and clear lungs now as  compared with 5 months ago. There are some patchy opacity within the  retrocardiac left lower lobe though this does not appear to be any  different from the previous exam and probably represents chronic change.     Summary:  1. Chronic lung changes with no focal acute lung disease.  This report was finalized on 06/09/2019 20:06 by Dr. Krishan Hoover MD.        I have personally reviewed and interpreted the radiology studies and ECG obtained at time of admission.     Assessment / Plan     Assessment:   Active Hospital Problems    Diagnosis   • Chest pain     Impression:  1.  Chest pain  2.  Dyspnea  3.  History of coronary artery disease  4.  Insulin-dependent diabetes  5.  Anemia  6.  Mild renal insufficiency with a creatinine of 1.35-1.65 today  7.  History of A. fib on chronic anticoagulation  8.  Benign essential hypertension    Plan:   1.  Cardiac markers  2.  Telemetry  3.  Labs in the morning to include lipid, TSH, A1c  4.  Consult cardiology  5.  Serial H&H  6.  Resume home medications  7.  Sliding scale insulin with Accu-Cheks      Code Status: Full     I discussed the patient's findings and my recommendations with the patient and family at bedside    Estimated length of stay 2 to 3 days    Flako Edwards DO   06/09/19   10:22 PM

## 2019-06-10 NOTE — PROGRESS NOTES
Discharge Planning Assessment  [facility]     Patient Name: Ha Agrawal  MRN: [id number]  Today's Date: 6/10/2019    Admit Date: 6/9/2019    Discharge Needs Assessment     Row Name 06/10/19 1037       Living Environment    Lives With  spouse    Name(s) of Who Lives With Patient  Smitha    Current Living Arrangements  home/apartment/condo    Primary Care Provided by  self    Provides Primary Care For  no one    Family Caregiver if Needed  spouse    Quality of Family Relationships  helpful;involved;supportive    Able to Return to Prior Arrangements  yes       Resource/Environmental Concerns    Resource/Environmental Concerns  none    Transportation Concerns  car, none       Transition Planning    Patient/Family Anticipates Transition to  home with family    Patient/Family Anticipated Services at Transition  none    Transportation Anticipated  family or friend will provide       Discharge Needs Assessment    Readmission Within the Last 30 Days  no previous admission in last 30 days    Concerns to be Addressed  no discharge needs identified;denies needs/concerns at this time    Equipment Currently Used at Home  none    Anticipated Changes Related to Illness  none    Equipment Needed After Discharge  none    Discharge Coordination/Progress  Pt has RX coverage and a PCP. Pt states that he plans to return home at discharge with wife. Pt denies any needs or concerns at this time. SW will follow and assist with any discharge needs that may arise.         Discharge Plan    No documentation.       Destination      No service coordination in this encounter.      Durable Medical Equipment      No service coordination in this encounter.      Dialysis/Infusion      No service coordination in this encounter.      Home Medical Care      No service coordination in this encounter.      Therapy      No service coordination in this encounter.      Community Resources      No service coordination in this encounter.           Demographic Summary    No documentation.       Functional Status    No documentation.       Psychosocial    No documentation.       Abuse/Neglect    No documentation.       Legal    No documentation.       Substance Abuse    No documentation.       Patient Forms    No documentation.           Gaye Sousa

## 2019-06-10 NOTE — CONSULTS
Inpatient Cardiology Consult  Consult performed by: Gabriele Weinstein MD  Consult ordered by: Flako Edwards DO  Reason for consult: Dr. Weinstein's patient      Chief Complaint: Shortness of breath    HPI: This is a 79 year old male with CAD, s/p CABG and, most recently a PCI to SVG to OM in 1/2019, chronic diastolic dysfunction, PAF/flutter, hypertension, hyperlipidemia, diabetes mellitus, stage 3 CKD, obesity, here with complaints of progressive shortness of breath and, yesterday, left arm pain.  The patient notes that over the past few weeks, he has noticed progressive SOB and CHAPARRO, now to the point of SOB with minimal exertion.  Also, he notes intermittent chest discomfort, described to me as a fullness (not true pain), moderate in intensity, associated with his shortness of breath, no radiation.In addition, he notes generalized weakness and fatigue.  No fevers, chills, cough.  No change in weight.  No orthopnea, PND, or increase in what he describes as baseline edema.  The patient was seen in our office last week and medications were adjusted at that time, including Imdur and Ranexa - these changes did not help with his symptoms.  The patient denies palpitations, lightheadedness, dizziness, syncope.  Yesterday, he notes acute onset of left arm discomfort - no significant chest discomfort at that time. This has resolved at this time.      He presented to the ER.  No acute ST changes and cardiac markers have been negative.  CXR with chronic changes but no focal infiltrate.  BNP essentially normal.  Hgb down from last check in 1/2019 - patient denies any obvious blood loss of any significance. In addition, he does have stage 3 CKD, and creatinine increased slightly from 1.35 in 1/2019 to 1.65 currently.    Past Medical History:   Diagnosis Date   • Angina pectoris (CMS/[facility])    • Chronic kidney disease    • Coronary artery disease involving native coronary artery of native heart without angina pectoris  3/10/2017   • Diabetes mellitus (CMS/HCC)    • Essential hypertension    • Hx of CABG 1/11/2018   • Mixed hyperlipidemia    • Paroxysmal atrial fibrillation (CMS/HCC)    • Shortness of breath    • Tobacco use      Past Surgical History:   Procedure Laterality Date   • CARDIAC CATHETERIZATION     • CARDIAC CATHETERIZATION N/A 1/25/2019    Procedure: Left Heart Cath, coronaries, grafts, possible;  Surgeon: Gabriele Weinstein MD;  Location: [facility] CATH INVASIVE LOCATION;  Service: Cardiology   • CATARACT EXTRACTION     • CORONARY ARTERY BYPASS GRAFT     • CORONARY STENT PLACEMENT  03/2015       Current Facility-Administered Medications:   •  amiodarone (PACERONE) tablet 200 mg, 200 mg, Oral, Daily, Flako Edwards DO  •  aspirin chewable tablet 81 mg, 81 mg, Oral, Daily, Flako Edwards DO  •  atorvastatin (LIPITOR) tablet 40 mg, 40 mg, Oral, Nightly, Flako Edwards DO, 40 mg at 06/09/19 2351  •  dextrose (GLUTOSE) oral gel 15 g, 15 g, Oral, Q15 Min PRN, Flako Edwards DO  •  diazePAM (VALIUM) tablet 5 mg, 5 mg, Oral, Nightly PRN, Flako Edwards DO  •  fenofibrate (TRICOR) tablet 145 mg, 145 mg, Oral, Daily, Flako dEwards DO  •  furosemide (LASIX) tablet 40 mg, 40 mg, Oral, Daily, Flako Edwards DO  •  insulin detemir (LEVEMIR) injection 80 Units, 80 Units, Subcutaneous, Nightly, Flako Edwards DO, 80 Units at 06/10/19 0011  •  insulin lispro (humaLOG) injection 2-7 Units, 2-7 Units, Subcutaneous, 4x Daily With Meals & Nightly, Flako Edwards DO  •  isosorbide mononitrate (IMDUR) 24 hr tablet 120 mg, 120 mg, Oral, Daily, Flako Edwards DO  •  lisinopril (PRINIVIL,ZESTRIL) tablet 5 mg, 5 mg, Oral, Daily, Flako Edwards DO  •  metoprolol succinate XL (TOPROL-XL) 24 hr tablet 75 mg, 75 mg, Oral, Daily, Flako Edwards, DO  •  OCUVITE-LUTEIN (OCUVITE) tablet 1 tablet, 1 tablet, Oral, Daily, Flako Edwards, DO  •  ondansetron (ZOFRAN) injection 4 mg, 4 mg,  Intravenous, Q6H PRN, Flako Edwards DO  •  pantoprazole (PROTONIX) EC tablet 20 mg, 20 mg, Oral, Daily, Flako Edwards DO  •  potassium chloride (MICRO-K) CR capsule 10 mEq, 10 mEq, Oral, Daily, Flako Edwards DO  •  ranolazine (RANEXA) 12 hr tablet 1,000 mg, 1,000 mg, Oral, BID, Flako Edwards DO, 1,000 mg at 06/09/19 2351  •  rivaroxaban (XARELTO) tablet 15 mg, 15 mg, Oral, Daily With Dinner, Flako Edwards DO, 15 mg at 06/09/19 2351  •  sertraline (ZOLOFT) tablet 25 mg, 25 mg, Oral, Daily, Flako Edwards DO  •  sodium bicarbonate tablet 650 mg, 650 mg, Oral, BID, Flako Edwards DO, 650 mg at 06/09/19 2351  •  sodium chloride 0.9 % flush 10 mL, 10 mL, Intravenous, PRN, Alexander Rodriguez MD  •  sodium chloride 0.9 % flush 3 mL, 3 mL, Intravenous, Q12H, Flako Edwards DO, 3 mL at 06/09/19 2351  •  sodium chloride 0.9 % flush 3-10 mL, 3-10 mL, Intravenous, PRN, Flako Edwards DO  •  tamsulosin (FLOMAX) 24 hr capsule 0.4 mg, 0.4 mg, Oral, Daily, Flako Edwards DO  •  ticagrelor (BRILINTA) tablet 90 mg, 90 mg, Oral, BID, Flako Edwards DO, 90 mg at 06/09/19 2351  •  vitamin E capsule 1,000 Units, 1,000 Units, Oral, Daily, Flako Edwards DO    Allergies   Allergen Reactions   • Adhesive Tape Dermatitis   • Penicillins Rash     Social History     Tobacco Use   • Smoking status: Former Smoker     Types: Pipe     Last attempt to quit: 2004     Years since quitting: 15.4   • Smokeless tobacco: Never Used   • Tobacco comment: QUIT AT AGE 63   Substance Use Topics   • Alcohol use: Yes     Comment: Socially     Family History   Problem Relation Age of Onset   • Heart attack Mother    • Heart disease Mother    • Heart attack Father    • Heart disease Father    • Heart disease Brother    • Heart disease Maternal Grandmother    • Heart attack Maternal Grandmother    • Heart disease Maternal Grandfather    • Heart attack Maternal Grandfather    • Heart disease  "Paternal Grandmother    • Heart attack Paternal Grandmother    • Heart disease Paternal Grandfather    • Heart attack Paternal Grandfather    • Alzheimer's disease Sister      Physical Exam:    /72 (BP Location: Left arm, Patient Position: Lying)   Pulse 67   Temp 98.2 °F (36.8 °C) (Oral)   Resp 21   Ht 185.4 cm (72.99\")   Wt 118 kg (261 lb 1 oz)   SpO2 98%   BMI 34.45 kg/m²   Temp:  [97.4 °F (36.3 °C)-98.2 °F (36.8 °C)] 98.2 °F (36.8 °C)  Heart Rate:  [67-81] 67  Resp:  [11-23] 21  BP: ()/(46-88) 121/72    Physical Exam   Constitutional: He is oriented to person, place, and time. Vital signs are normal. He appears well-developed and well-nourished. He is cooperative.  Non-toxic appearance. No distress.   HENT:   Head: Normocephalic and atraumatic.   Right Ear: External ear normal.   Left Ear: External ear normal.   Nose: Nose normal.   Mouth/Throat: Uvula is midline, oropharynx is clear and moist and mucous membranes are normal. Mucous membranes are not pale, not dry and not cyanotic. No oropharyngeal exudate.   Eyes: EOM and lids are normal. Pupils are equal, round, and reactive to light.   Neck: Normal range of motion. Neck supple. No hepatojugular reflux and no JVD present. Carotid bruit is not present. No tracheal deviation and no edema present. No thyroid mass and no thyromegaly present.   Cardiovascular: Normal rate, S1 normal, S2 normal, normal heart sounds, intact distal pulses and normal pulses. An irregularly irregular rhythm present.  No extrasystoles are present. PMI is not displaced. Exam reveals no gallop and no friction rub.   No murmur heard.  Pulses:       Radial pulses are 2+ on the right side, and 2+ on the left side.        Femoral pulses are 2+ on the right side, and 2+ on the left side.       Dorsalis pedis pulses are 2+ on the right side, and 2+ on the left side.        Posterior tibial pulses are 2+ on the right side, and 2+ on the left side.   Pulmonary/Chest: Effort " normal. No accessory muscle usage. Tachypnea noted. No respiratory distress. He has no wheezes. He has rhonchi. He has no rales. He exhibits no tenderness.   Abdominal: Soft. Normal appearance and bowel sounds are normal. He exhibits no distension, no abdominal bruit and no pulsatile midline mass. There is no hepatosplenomegaly. There is no tenderness.   Musculoskeletal: Normal range of motion. He exhibits edema (trace bilateral). He exhibits no tenderness or deformity.   Lymphadenopathy:     He has no cervical adenopathy.   Neurological: He is oriented to person, place, and time. He has normal strength. No cranial nerve deficit.   Skin: Skin is warm, dry and intact. No rash noted. He is not diaphoretic. No cyanosis or erythema. Nails show no clubbing.   Psychiatric: He has a normal mood and affect. His speech is normal and behavior is normal. Thought content normal.   Vitals reviewed.    Diagnostic Data:    Lab Results   Component Value Date    WBC 5.34 06/10/2019    HGB 9.1 (L) 06/10/2019    HCT 28.2 (L) 06/10/2019    MCV 97.6 (H) 06/10/2019     06/10/2019     **Hgb 1/2019 ~ 12-13    Lab Results   Component Value Date    GLUCOSE 90 06/10/2019    CALCIUM 9.6 06/10/2019     06/10/2019    K 4.2 06/10/2019    CO2 24.0 06/10/2019     06/10/2019    BUN 25 (H) 06/10/2019    CREATININE 1.66 (H) 06/10/2019    EGFRIFNONA 40 (L) 06/10/2019    BCR 15.1 06/10/2019    ANIONGAP 10.0 06/10/2019      (previously 1090 on 1/23/2019)    Troponin negative on consecutive checks    ECG: afib with controlled HR, NSTT (no change from 1/2019)    CXR:  Low lung volumes with chronic lung changes. Prominence of parenchymal  markings though overall slightly better expansion and clear lungs now as  compared with 5 months ago. There are some patchy opacity within the  retrocardiac left lower lobe though this does not appear to be any  different from the previous exam and probably represents chronic change.    Echo  1/24/2019:  · Left ventricular systolic function is normal. Estimated EF appears to be in the range of 56 - 60%.  · Left ventricular wall thickness is consistent with mild concentric hypertrophy.  · No obvious valvular abnormalities.    ASSESSMENT/PLAN:    1. Shortness of breath  2. Atypical chest pain  3. CAD in native heart, native artery and bypass graft  4. Stage 3 CKD  5. Anemia  6. Essential hypertension  7. Mixed hyperlipidemia  8. Type II diabetes mellitus, insulin requiring with nephropathy  9. Chronic diastolic heart failure  10. Paroxysmal atrial fibrillation/flutter  11. Chronic anticoagulation status    - Patient admitted with increasing shortness of breath and atypical chest pain.  He has ruled out for MI so far with serial cardiac enzymes.  His BNP was essentially normal.  CXR with essentially clear lungs.  Found to be anemic with acute on chronic renal insufficiency.  He may have potentially a multitude of reasons to have his current symptoms.  - Will order a nuclear stress test to evaluate for ischemia.  Patient known to have severe native multivessel CAD, so unless a large area is ischemic, may not consider heat cath, but will see what results show and make further plans after this has resulted.  Certainly, the development of a new stenosis could explain at least some of this patient's symptoms.  Also, if EF has declined on nuclear, we will plan a formal echocardiogram to evaluate further as well.  - Will defer any work-up for anemia to the primary service - no obvious blood loss however and was hemoccult negative in ER last night, reportedly.  Certainly with previous Hgb of 12-13, now at 9, in the setting of this patient's CAD, this could be a contributor to his shortness of breath.  - Would not think that the patient is in acute CHF - rhonchi in lungs today but no significant rales, CXR without significant edema and BNP normal; however, also does not appears to have PNA.  No reported history of  COPD but is a former smoker.  No reason to consider PE as he is chronically anticoagulated.  - Patient is on amiodarone, so certainly could have developed toxicity from this medication that could explain his shortness of breath as well, although has been on a stable dose for many years (at least back to 2015).  - Thank you for this consultation, we will continue to follow closely.

## 2019-06-10 NOTE — PLAN OF CARE
Problem: Fall Risk (Adult)  Goal: Identify Related Risk Factors and Signs and Symptoms  Outcome: Ongoing (interventions implemented as appropriate)   06/10/19 0522   Fall Risk (Adult)   Related Risk Factors (Fall Risk) fatigue/slow reaction;environment unfamiliar   Signs and Symptoms (Fall Risk) presence of risk factors     Goal: Absence of Fall  Outcome: Ongoing (interventions implemented as appropriate)   06/10/19 0522   Fall Risk (Adult)   Absence of Fall achieves outcome       Problem: Patient Care Overview  Goal: Plan of Care Review  Outcome: Ongoing (interventions implemented as appropriate)   06/10/19 0522   Coping/Psychosocial   Plan of Care Reviewed With patient   Plan of Care Review   Progress no change   OTHER   Outcome Summary Pt. admitted to  from ER for chest pain. Pt. has had no c/o of chest pain since arrival to floor. Hgb 8.9 last night, repeat draw this AM. Awaiting cardiology consult today. NPO since midnight d/t possible cardiac testing. Afib/Aflutter 68-73, down to 43 with a 2.39 second pause on tele. Will continue to monitor and await MD orders.       Problem: Cardiac: ACS (Acute Coronary Syndrome) (Adult)  Goal: Signs and Symptoms of Listed Potential Problems Will be Absent, Minimized or Managed (Cardiac: ACS)  Outcome: Ongoing (interventions implemented as appropriate)   06/10/19 0522   Goal/Outcome Evaluation   Problems Assessed (Acute Coronary Syndrome) all   Problems Present (Acute Coronary Syn) dysrhythmia/arrhythmia

## 2019-06-10 NOTE — PROGRESS NOTES
[facility] Medicine Services  INPATIENT PROGRESS NOTE    Patient Name: Ha Agrawal  Date of Admission: 6/9/2019  Today's Date: 06/10/19  Length of Stay: 0  Primary Care Physician: Kevin Latham APRN    Subjective   Chief Complaint: follow up chest pain  HPI   Pt states he has no further left arm pain. Cardiology has already seen and ordered nuclear stress test. His hemoglobin in January 2019 was 13. Now 8.9. He is on Brilinta, Xarelto, and ASA. He denies any noticeable bleeding. States no prior colonoscopy. Has a history of PUDz but has no symptoms.     Review of Systems   All pertinent negatives and positives are as above. All other systems have been reviewed and are negative unless otherwise stated.     Objective    Temp:  [97.4 °F (36.3 °C)-98.2 °F (36.8 °C)] 98.2 °F (36.8 °C)  Heart Rate:  [67-81] 71  Resp:  [11-23] 21  BP: ()/(46-88) 121/72  Physical Exam   Constitutional: He is oriented to person, place, and time. He appears well-developed and well-nourished.   obese   HENT:   Head: Normocephalic and atraumatic.   Eyes: EOM are normal. Pupils are equal, round, and reactive to light. No scleral icterus.   Neck: Normal range of motion. Neck supple. No JVD present. Carotid bruit is not present. No tracheal deviation present. No thyromegaly present.   Cardiovascular: Normal rate and regular rhythm. Exam reveals no gallop and no friction rub.   No murmur heard.  Afib/aflutter 68-94. Got down to 43.    Pulmonary/Chest: Effort normal and breath sounds normal. No respiratory distress. He has no wheezes. He has no rales. He exhibits no tenderness.   Abdominal: Soft. Bowel sounds are normal. He exhibits no distension. There is no tenderness.   Musculoskeletal: Normal range of motion. He exhibits no edema.   Neurological: He is alert and oriented to person, place, and time. No cranial nerve deficit.   Skin: Skin is warm and dry. No rash noted.   Psychiatric: He has a  normal mood and affect.     Results Review:  I have reviewed the labs, radiology results, and diagnostic studies.    Laboratory Data:   Results from last 7 days   Lab Units 06/10/19  0757 06/10/19  0545 06/09/19  2228 06/09/19  1950   WBC 10*3/mm3  --  5.34  --  5.26   HEMOGLOBIN g/dL 9.1* 9.2* 8.9* 9.2*   HEMATOCRIT % 28.2* 28.7* 27.6* 28.0*   PLATELETS 10*3/mm3  --  220  --  240        Results from last 7 days   Lab Units 06/10/19  0545 06/09/19  1950   SODIUM mmol/L 138 137   POTASSIUM mmol/L 4.2 4.3   CHLORIDE mmol/L 104 103   CO2 mmol/L 24.0 24.0   BUN mg/dL 25* 30*   CREATININE mg/dL 1.66* 1.65*   CALCIUM mg/dL 9.6 9.7   BILIRUBIN mg/dL 0.5 0.5   ALK PHOS U/L 57 65   ALT (SGPT) U/L 17 21   AST (SGOT) U/L 18 47*   GLUCOSE mg/dL 90 137*     Radiology Data:   Imaging Results (last 24 hours)     Procedure Component Value Units Date/Time    XR Chest 1 View [[id number]] Collected:  06/09/19 2005     Updated:  06/09/19 2009    Narrative:       EXAMINATION: XR CHEST 1 VW-     6/9/2019 7:57 PM CDT     HISTORY: Chest Pain Triage Protocol     1 view chest x-ray compared with 01/23/2019.     Low lung volumes with chronic lung changes. Prominence of parenchymal  markings though overall slightly better expansion and clear lungs now as  compared with 5 months ago. There are some patchy opacity within the  retrocardiac left lower lobe though this does not appear to be any  different from the previous exam and probably represents chronic change.     Summary:  1. Chronic lung changes with no focal acute lung disease.  This report was finalized on 06/09/2019 20:06 by Dr. Krishan Hoover MD.        I have reviewed the patient's current medications.     Assessment/Plan     Active Hospital Problems    Diagnosis   • **Chest pain   • Anemia   • Elevated TSH   • Chronic anticoagulation   • Chronic atrial fibrillation (CMS/HCC)   • Essential hypertension   • Mixed hyperlipidemia   • Stage 3 chronic kidney disease (CMS/HCC)   • Type 2  diabetes mellitus with chronic kidney disease, with long-term current use of insulin (CMS/[facility])     Plan:  1. Await lexiscan stress test.   2. Will add atrovent nebs.   3. Will check iron panel, B12, Folate and occult blood X3  4. Repeat labs in am.   5. Will check T3, T4    Discharge Planning: I expect the patient to be discharged to home in 1 days.    CLARIBEL Neal   06/10/19   10:26 AM     I personally evaluated and examined the patient in conjunction with CLARIBEL Ward and agree with the assessment, treatment plan, and disposition of the patient as recorded by her. My history, exam, and further recommendations are:     Suspect with DAPT and xarelto patient had a GI bleed.  Lexiscan tomorrow.  Discuss with cardiology possibility of discontinuing one of the antiplatelet medications as patient is high risk for bleed while on triple therapy.  Often this is not continued long-term.  Patient has progressive shortness of breath.  Most recent echo does not comment on pulmonary hypertension which can often cause shortness of breath out of proportion to examination.    Results for orders placed during the hospital encounter of 01/23/19   Adult Transthoracic Echo Complete W/ Cont if Necessary Per Protocol    Narrative · Left ventricular systolic function is normal. Estimated EF appears to be   in the range of 56 - 60%.  · Left ventricular wall thickness is consistent with mild concentric   hypertrophy.  · No obvious valvular abnormalities.           Goran Byrne MD  06/10/19  8:03 PM

## 2019-06-11 PROBLEM — N17.9 ACUTE KIDNEY INJURY (HCC): Status: ACTIVE | Noted: 2019-01-01

## 2019-06-11 NOTE — PROGRESS NOTES
[facility] -  Progress Note     LOS: 0 days   Patient Care Team:  Kevin Latham APRN as PCP - General (Nurse Practitioner)  Gabriele Weinstein MD as Cardiologist (Cardiology)    Chief Complaint: chest pain follow up    Subjective     Interval History: denies chest pain. Endorses continued shortness of breath with exertion. Denies orthopnea, PND. AF, rate controlled. Denies BRBPR.        Review of Systems:     Review of Systems   Constitutional: Positive for activity change. Negative for appetite change, diaphoresis and fatigue.   HENT: Negative for congestion.    Respiratory: Positive for shortness of breath. Negative for chest tightness and wheezing.    Cardiovascular: Negative for chest pain, palpitations and leg swelling.   Gastrointestinal: Negative for blood in stool, constipation, diarrhea, nausea and vomiting.   Genitourinary: Negative for difficulty urinating.   Neurological: Negative for dizziness, syncope, weakness and light-headedness.   Hematological: Bruises/bleeds easily.   Psychiatric/Behavioral: Negative for agitation.     Objective     Vital Sign Min/Max for last 24 hours  Temp  Min: 97.6 °F (36.4 °C)  Max: 98.5 °F (36.9 °C)   BP  Min: 101/62  Max: 130/64   Pulse  Min: 68  Max: 82   Resp  Min: 18  Max: 24   SpO2  Min: 94 %  Max: 98 %   No Data Recorded   Weight  Min: 117 kg (258 lb 9.6 oz)  Max: 118 kg (260 lb 2.3 oz)         06/10/19  2040   Weight: 117 kg (258 lb 9.6 oz)       Physical Exam:    Physical Exam   Constitutional: He is oriented to person, place, and time. He appears well-developed and well-nourished. No distress.   HENT:   Head: Normocephalic and atraumatic.   Eyes: Conjunctivae and EOM are normal. Pupils are equal, round, and reactive to light.   Neck: Normal range of motion. Neck supple. No thyromegaly present.   Cardiovascular: Normal rate and normal heart sounds. An irregularly irregular rhythm present.   Pulmonary/Chest: Effort normal and breath  sounds normal. No respiratory distress. He has no wheezes. He has no rales.   Abdominal: Soft. Bowel sounds are normal. He exhibits no distension. There is no tenderness.   Musculoskeletal: Normal range of motion. He exhibits no edema.   Neurological: He is alert and oriented to person, place, and time.   Skin: Skin is warm, dry and intact. No rash noted. He is not diaphoretic. No erythema. No pallor.   Psychiatric: He has a normal mood and affect. His behavior is normal.   Vitals reviewed.    Results Review:   Lab Results (last 72 hours)     Procedure Component Value Units Date/Time    POC Glucose Once [[id number]]  (Normal) Collected:  06/11/19 0818    Specimen:  Blood Updated:  06/11/19 0852     Glucose 129 mg/dL      Comment: : [id number] FernandezaMeter ID: [id number]       Basic Metabolic Panel [[id number]]  (Abnormal) Collected:  06/11/19 0456    Specimen:  Blood Updated:  06/11/19 0620     Glucose 91 mg/dL      BUN 28 mg/dL      Creatinine 1.76 mg/dL      Sodium 140 mmol/L      Potassium 4.3 mmol/L      Chloride 102 mmol/L      CO2 26.0 mmol/L      Calcium 9.7 mg/dL      eGFR Non African Amer 38 mL/min/1.73      BUN/Creatinine Ratio 15.9     Anion Gap 12.0 mmol/L     Narrative:       GFR Normal >60  Chronic Kidney Disease <60  Kidney Failure <15    CBC & Differential [[id number]] Collected:  06/11/19 0456    Specimen:  Blood Updated:  06/11/19 0609    Narrative:       The following orders were created for panel order CBC & Differential.  Procedure                               Abnormality         Status                     ---------                               -----------         ------                     CBC Auto Differential[[id number]]        Abnormal            Final result                 Please view results for these tests on the individual orders.    CBC Auto Differential [[id number]]  (Abnormal) Collected:  06/11/19 0456    Specimen:  Blood Updated:  06/11/19 0609     WBC 5.40 10*3/mm3      RBC  3.02 10*6/mm3      Hemoglobin 9.6 g/dL      Hematocrit 30.1 %      MCV 99.7 fL      MCH 31.8 pg      MCHC 31.9 g/dL      RDW 15.5 %      RDW-SD 55.5 fl      MPV 10.0 fL      Platelets 255 10*3/mm3      Neutrophil % 58.1 %      Lymphocyte % 24.4 %      Monocyte % 14.1 %      Eosinophil % 2.8 %      Basophil % 0.4 %      Immature Grans % 0.2 %      Neutrophils, Absolute 3.14 10*3/mm3      Lymphocytes, Absolute 1.32 10*3/mm3      Monocytes, Absolute 0.76 10*3/mm3      Eosinophils, Absolute 0.15 10*3/mm3      Basophils, Absolute 0.02 10*3/mm3      Immature Grans, Absolute 0.01 10*3/mm3      nRBC 0.0 /100 WBC     T3, Free [[id number]] Collected:  06/09/19 1950    Specimen:  Blood Updated:  06/11/19 0510     T3, Free 2.2 pg/mL     Narrative:       Performed at:  [address] [place] [address]  [id number]  : Min Luu PhD, Phone:  [phone]    Hemoglobin & Hematocrit, Blood [[id number]]  (Abnormal) Collected:  06/10/19 2255    Specimen:  Blood Updated:  06/10/19 2300     Hemoglobin 9.5 g/dL      Hematocrit 28.9 %     POC Glucose Once [[id number]]  (Normal) Collected:  06/10/19 2041    Specimen:  Blood Updated:  06/10/19 2107     Glucose 117 mg/dL      Comment: : [id number] Denton Cedeño (Quarles) ID: [id number]       POC Glucose Once [[id number]]  (Normal) Collected:  06/10/19 1520    Specimen:  Blood Updated:  06/10/19 1550     Glucose 91 mg/dL      Comment: : [id number] Rhett Camacho ID: [id number]       Hemoglobin & Hematocrit, Blood [[id number]]  (Abnormal) Collected:  06/10/19 1531    Specimen:  Blood Updated:  06/10/19 1541     Hemoglobin 9.5 g/dL      Hematocrit 29.3 %     POC Glucose Once [[id number]]  (Normal) Collected:  06/10/19 1125    Specimen:  Blood Updated:  06/10/19 1227     Glucose 96 mg/dL      Comment: : [id number] Rhett Camacho ID: [id number]       Vitamin B12 [[id number]]  (Normal) Collected:  06/10/19 0545    Specimen:  Blood Updated:  06/10/19  1222     Vitamin B-12 352 pg/mL     Folate [[id number]]  (Normal) Collected:  06/10/19 0545    Specimen:  Blood Updated:  06/10/19 1222     Folate 12.60 ng/mL     Ferritin [[id number]]  (Normal) Collected:  06/10/19 0545    Specimen:  Blood Updated:  06/10/19 1151     Ferritin 29.50 ng/mL     T4, Free [[id number]]  (Normal) Collected:  06/10/19 0545    Specimen:  Blood Updated:  06/10/19 1133     Free T4 1.00 ng/dL     Iron Profile [[id number]] Collected:  06/10/19 0545    Specimen:  Blood Updated:  06/10/19 1057    POC Glucose Once [[id number]]  (Normal) Collected:  06/10/19 0802    Specimen:  Blood Updated:  06/10/19 0831     Glucose 93 mg/dL      Comment: : [id number] Rhett Camacho ID: [id number]       Hemoglobin & Hematocrit, Blood [[id number]]  (Abnormal) Collected:  06/10/19 0757    Specimen:  Blood Updated:  06/10/19 0819     Hemoglobin 9.1 g/dL      Hematocrit 28.2 %     Hemoglobin A1c [[id number]] Collected:  06/10/19 0545    Specimen:  Blood Updated:  06/10/19 0741     Hemoglobin A1C 6.3 %     Narrative:       Less than 6.0           Non-Diabetic Range  6.0-7.0                 ADA Therapeutic Target  Greater than 7.0        Action Suggested    TSH [[id number]]  (Abnormal) Collected:  06/10/19 0545    Specimen:  Blood Updated:  06/10/19 0730     TSH 10.800 mIU/mL     Lipid Panel [[id number]]  (Abnormal) Collected:  06/10/19 0545    Specimen:  Blood Updated:  06/10/19 0711     Total Cholesterol 120 mg/dL      Triglycerides 135 mg/dL      HDL Cholesterol 36 mg/dL      LDL Cholesterol  61 mg/dL      LDL/HDL Ratio 1.58    Troponin [[id number]]  (Normal) Collected:  06/10/19 0545    Specimen:  Blood Updated:  06/10/19 0711     Troponin I <0.012 ng/mL     Comprehensive Metabolic Panel [[id number]]  (Abnormal) Collected:  06/10/19 0545    Specimen:  Blood Updated:  06/10/19 0710     Glucose 90 mg/dL      BUN 25 mg/dL      Creatinine 1.66 mg/dL      Sodium 138 mmol/L      Potassium 4.2 mmol/L      Chloride 104 mmol/L       CO2 24.0 mmol/L      Calcium 9.6 mg/dL      Total Protein 6.9 g/dL      Albumin 3.90 g/dL      ALT (SGPT) 17 U/L      AST (SGOT) 18 U/L      Alkaline Phosphatase 57 U/L      Total Bilirubin 0.5 mg/dL      eGFR Non African Amer 40 mL/min/1.73      Globulin 3.0 gm/dL      A/G Ratio 1.3 g/dL      BUN/Creatinine Ratio 15.1     Anion Gap 10.0 mmol/L     Narrative:       GFR Normal >60  Chronic Kidney Disease <60  Kidney Failure <15    Phosphorus [[id number]]  (Normal) Collected:  06/10/19 0545    Specimen:  Blood Updated:  06/10/19 0659     Phosphorus 4.1 mg/dL     CBC Auto Differential [[id number]]  (Abnormal) Collected:  06/10/19 0545    Specimen:  Blood Updated:  06/10/19 0637     WBC 5.34 10*3/mm3      RBC 2.94 10*6/mm3      Hemoglobin 9.2 g/dL      Hematocrit 28.7 %      MCV 97.6 fL      MCH 31.3 pg      MCHC 32.1 g/dL      RDW 15.2 %      RDW-SD 53.7 fl      MPV 9.8 fL      Platelets 220 10*3/mm3      Neutrophil % 65.7 %      Lymphocyte % 18.7 %      Monocyte % 12.2 %      Eosinophil % 2.6 %      Basophil % 0.4 %      Immature Grans % 0.4 %      Neutrophils, Absolute 3.51 10*3/mm3      Lymphocytes, Absolute 1.00 10*3/mm3      Monocytes, Absolute 0.65 10*3/mm3      Eosinophils, Absolute 0.14 10*3/mm3      Basophils, Absolute 0.02 10*3/mm3      Immature Grans, Absolute 0.02 10*3/mm3      nRBC 0.0 /100 WBC     Troponin [[id number]]  (Normal) Collected:  06/09/19 2228    Specimen:  Blood Updated:  06/09/19 2305     Troponin I <0.012 ng/mL     Hemoglobin & Hematocrit, Blood [[id number]]  (Abnormal) Collected:  06/09/19 2228    Specimen:  Blood Updated:  06/09/19 2238     Hemoglobin 8.9 g/dL      Hematocrit 27.6 %     BNP [[id number]]  (Normal) Collected:  06/09/19 1950    Specimen:  Blood Updated:  06/09/19 2123     proBNP 984.0 pg/mL     POC Occult Blood Stool [[id number]]  (Normal) Collected:  06/09/19 2029    Specimen:  Stool Updated:  06/09/19 2030     Fecal Occult Blood Negative     Lot Number [id number]     Expiration Date  2/29/2020     DEVELOPER LOT NUMBER [id number]     DEVELOPER EXPIRATION DATE 2/29/2020     Positive Control Positive     Negative Control Negative    Troponin [[id number]]  (Normal) Collected:  06/09/19 1950    Specimen:  Blood Updated:  06/09/19 2029     Troponin I <0.012 ng/mL     Comprehensive Metabolic Panel [[id number]]  (Abnormal) Collected:  06/09/19 1950    Specimen:  Blood Updated:  06/09/19 2018     Glucose 137 mg/dL      BUN 30 mg/dL      Creatinine 1.65 mg/dL      Sodium 137 mmol/L      Potassium 4.3 mmol/L      Chloride 103 mmol/L      CO2 24.0 mmol/L      Calcium 9.7 mg/dL      Total Protein 7.7 g/dL      Albumin 4.30 g/dL      ALT (SGPT) 21 U/L      AST (SGOT) 47 U/L      Alkaline Phosphatase 65 U/L      Total Bilirubin 0.5 mg/dL      eGFR Non African Amer 41 mL/min/1.73      Globulin 3.4 gm/dL      A/G Ratio 1.3 g/dL      BUN/Creatinine Ratio 18.2     Anion Gap 10.0 mmol/L     Narrative:       GFR Normal >60  Chronic Kidney Disease <60  Kidney Failure <15    CBC & Differential [[id number]] Collected:  06/09/19 1950    Specimen:  Blood Updated:  06/09/19 2009    Narrative:       The following orders were created for panel order CBC & Differential.  Procedure                               Abnormality         Status                     ---------                               -----------         ------                     CBC Auto Differential[[id number]]        Abnormal            Final result                 Please view results for these tests on the individual orders.    CBC Auto Differential [[id number]]  (Abnormal) Collected:  06/09/19 1950    Specimen:  Blood Updated:  06/09/19 2009     WBC 5.26 10*3/mm3      RBC 2.95 10*6/mm3      Hemoglobin 9.2 g/dL      Hematocrit 28.0 %      MCV 94.9 fL      MCH 31.2 pg      MCHC 32.9 g/dL      RDW 15.2 %      RDW-SD 52.7 fl      MPV 9.7 fL      Platelets 240 10*3/mm3      Neutrophil % 59.6 %      Lymphocyte % 23.2 %      Monocyte % 13.3 %      Eosinophil % 2.5 %       Basophil % 0.8 %      Immature Grans % 0.6 %      Neutrophils, Absolute 3.14 10*3/mm3      Lymphocytes, Absolute 1.22 10*3/mm3      Monocytes, Absolute 0.70 10*3/mm3      Eosinophils, Absolute 0.13 10*3/mm3      Basophils, Absolute 0.04 10*3/mm3      Immature Grans, Absolute 0.03 10*3/mm3      nRBC 0.0 /100 WBC           Medication Review: yes  Current Facility-Administered Medications   Medication Dose Route Frequency Provider Last Rate Last Dose   • amiodarone (PACERONE) tablet 200 mg  200 mg Oral Daily Flako Edwards DO   200 mg at 06/11/19 0943   • aspirin chewable tablet 81 mg  81 mg Oral Daily Flako Edwards DO   81 mg at 06/11/19 0943   • atorvastatin (LIPITOR) tablet 40 mg  40 mg Oral Nightly Flako Edwards DO   40 mg at 06/10/19 2054   • dextrose (GLUTOSE) oral gel 15 g  15 g Oral Q15 Min PRN Flako Edwards DO       • diazePAM (VALIUM) tablet 5 mg  5 mg Oral Nightly PRN Flako Edwards DO       • doxycycline (ADOXA) tablet 100 mg  100 mg Oral Q12H Yue Leary APRN   100 mg at 06/11/19 0943   • fenofibrate (TRICOR) tablet 145 mg  145 mg Oral Daily Flako Edwards DO   145 mg at 06/11/19 0944   • insulin detemir (LEVEMIR) injection 80 Units  80 Units Subcutaneous Nightly Flaok Edwards DO   80 Units at 06/10/19 2054   • insulin lispro (humaLOG) injection 2-7 Units  2-7 Units Subcutaneous 4x Daily With Meals & Nightly Flako Edwards DO       • ipratropium (ATROVENT) nebulizer solution 0.5 mg  0.5 mg Nebulization 4x Daily - RT Yue Leary APRN   0.5 mg at 06/11/19 0726   • isosorbide mononitrate (IMDUR) 24 hr tablet 120 mg  120 mg Oral Daily Flako Edwards DO   120 mg at 06/11/19 0943   • lisinopril (PRINIVIL,ZESTRIL) tablet 5 mg  5 mg Oral Daily Flako Edwards DO   5 mg at 06/11/19 0943   • metoprolol succinate XL (TOPROL-XL) 24 hr tablet 75 mg  75 mg Oral Daily Flako Edwards DO   75 mg at 06/11/19 0943   • OCUVITE-LUTEIN (OCUVITE)  tablet 1 tablet  1 tablet Oral Daily Flako Edwards DO   1 tablet at 06/11/19 0944   • ondansetron (ZOFRAN) injection 4 mg  4 mg Intravenous Q6H PRN Flako Edwards DO       • pantoprazole (PROTONIX) EC tablet 40 mg  40 mg Oral Daily Yue Leary, APRN   40 mg at 06/11/19 0943   • potassium chloride (MICRO-K) CR capsule 10 mEq  10 mEq Oral Daily Flako Edwards DO   10 mEq at 06/11/19 0943   • ranolazine (RANEXA) 12 hr tablet 1,000 mg  1,000 mg Oral BID Flako Edwards DO   1,000 mg at 06/11/19 0943   • rivaroxaban (XARELTO) tablet 15 mg  15 mg Oral Daily With Dinner Flako Edwards DO   15 mg at 06/10/19 1733   • sertraline (ZOLOFT) tablet 25 mg  25 mg Oral Daily Flako Edwards DO   25 mg at 06/11/19 0943   • sodium bicarbonate tablet 650 mg  650 mg Oral BID Flako Edwards DO   650 mg at 06/11/19 0943   • sodium chloride 0.9 % flush 10 mL  10 mL Intravenous PRN Alexander Rodriguez MD       • sodium chloride 0.9 % flush 3 mL  3 mL Intravenous Q12H Flako Edwards DO   3 mL at 06/11/19 0943   • sodium chloride 0.9 % flush 3-10 mL  3-10 mL Intravenous PRN Flako Edwards DO       • sodium chloride 0.9 % infusion  125 mL/hr Intravenous Continuous Yue Leary, APRN 125 mL/hr at 06/11/19 0942 125 mL/hr at 06/11/19 0942   • tamsulosin (FLOMAX) 24 hr capsule 0.4 mg  0.4 mg Oral Daily Flako Edwards DO   0.4 mg at 06/11/19 0943   • ticagrelor (BRILINTA) tablet 90 mg  90 mg Oral BID Flako Edwards DO   90 mg at 06/11/19 0943   • vitamin E capsule 1,000 Units  1,000 Units Oral Daily Flako Edwards, DO   1,000 Units at 06/11/19 0943     Interpretation Summary   Myocardial Perfusion Study - 06.10.2019  · Myocardial perfusion imaging indicates a normal myocardial perfusion study with no evidence of ischemia.  · Left ventricular ejection fraction is normal (Calculated EF = 67%).  · Impressions are consistent with a low risk study.       Assessment/Plan      1. Shortness of breath  2. Atypical chest pain: low risk nuclear stress result   3. CAD in native heart, native artery and bypass graft  4. Stage 3 CKD: CrCl 58.17 today  5. Anemia  6. Essential hypertension  7. Mixed hyperlipidemia  8. Type II diabetes mellitus, with current insulin use  9. Chronic diastolic heart failure  10. Chronic atrial fibrillation/flutter  11. Chronic anticoagulation status: Xarelto    Plan    - discontinue aspirin (discussed with CLARIBEL Ward) as he has been on triple therapy since stent placement in January and has anemia. Previous plans outlined stopping aspirin after one month, however, patient has continued aspirin.   - change Xartelto to 20 mg daily as patient CrCl >50  - echo ordered for today  - discussed plans for overnight pulse oximetry due to hypoxia on walking ox today as well as outpt sleep study and PFT or high res CT  - ongoing workup for anemia per primary team  - discontinue amiodarone as patient is chronic AF - monitor rate control, continue beta blocker            CLARIBEL Tang  06/11/19  9:47 AM

## 2019-06-11 NOTE — PROGRESS NOTES
Exercise Oximetry    Patient Name:Ha Agrawal   MRN: [id number]   Date: 06/11/19             ROOM AIR BASELINE   SpO2% 95   Heart Rate 71   Blood Pressure      EXERCISE ON ROOM AIR SpO2% EXERCISE ON O2 @ 2 LPM SpO2%   1 MINUTE  1 MINUTE    2 MINUTES  2 MINUTES    3 MINUTES  3 MINUTES    4 MINUTES  4 MINUTES    5 MINUTES  5 MINUTES    6 MINUTES  6 MINUTES               Distance Walked  350 FT. Distance Walked 100 FT   Dyspnea ([place] Scale) 3 Dyspnea ([place] Scale) 2   Fatigue ([place] Scale)   Fatigue ([place] Scale)   SpO2% Post Exercise  87 SpO2% Post Exercise 98   HR Post Exercise  122 HR Post Exercise 105   Time to Recovery   Time to Recovery     Comments:

## 2019-06-11 NOTE — PLAN OF CARE
Problem: Patient Care Overview  Goal: Plan of Care Review  Outcome: Ongoing (interventions implemented as appropriate)   06/11/19 2914   Coping/Psychosocial   Plan of Care Reviewed With patient   Plan of Care Review   Progress no change   OTHER   Outcome Summary no c/o pain soa w/ exertion et qualified for hm o2 2l nc sat 87% w/ exertion to have sleep study this pm ob sent tel on

## 2019-06-11 NOTE — PROGRESS NOTES
[facility] Medicine Services  INPATIENT PROGRESS NOTE    Patient Name: Ha Agrawal  Date of Admission: 6/9/2019  Today's Date: 06/11/19  Length of Stay: 0  Primary Care Physician: Kevin Latham APRN    Subjective   Chief Complaint: follow up shortness of breath  HPI   Pt is awake and alert. Remains on oxygen at 2L. States he still has exertional shortness of breath. No significant cough or production of sputum. No further left arm pain. No chest pain. States he still feels generalized weakness.     Review of Systems   All pertinent negatives and positives are as above. All other systems have been reviewed and are negative unless otherwise stated.     Objective    Temp:  [97.6 °F (36.4 °C)-98.5 °F (36.9 °C)] 97.7 °F (36.5 °C)  Heart Rate:  [68-82] 77  Resp:  [18-24] 18  BP: (101-130)/(52-64) 130/64  Physical Exam   Constitutional: He is oriented to person, place, and time. He appears well-developed.   Morbidly obese.    HENT:   Head: Normocephalic and atraumatic.   Eyes: EOM are normal. Pupils are equal, round, and reactive to light. No scleral icterus.   Neck: Normal range of motion. Neck supple. No JVD present. Carotid bruit is not present. No tracheal deviation present. No thyromegaly present.   Cardiovascular: Normal rate and regular rhythm. Exam reveals no gallop and no friction rub.   No murmur heard.  afib 76-93   Pulmonary/Chest: Effort normal. No respiratory distress. He has no wheezes. He has no rales. He exhibits no tenderness.   Coarse bilaterally.    Abdominal: Soft. Bowel sounds are normal. He exhibits no distension. There is no tenderness.   Musculoskeletal: Normal range of motion. He exhibits no edema.   Neurological: He is alert and oriented to person, place, and time. No cranial nerve deficit.   Skin: Skin is warm and dry. No rash noted.   Psychiatric: He has a normal mood and affect.     Results Review:  I have reviewed the labs, radiology results, and  diagnostic studies.    Laboratory Data:   Results from last 7 days   Lab Units 06/11/19  0456 06/10/19  2255 06/10/19  1531  06/10/19  0545  06/09/19  1950   WBC 10*3/mm3 5.40  --   --   --  5.34  --  5.26   HEMOGLOBIN g/dL 9.6* 9.5* 9.5*   < > 9.2*   < > 9.2*   HEMATOCRIT % 30.1* 28.9* 29.3*   < > 28.7*   < > 28.0*   PLATELETS 10*3/mm3 255  --   --   --  220  --  240    < > = values in this interval not displayed.        Results from last 7 days   Lab Units 06/11/19 0456 06/10/19  0545 06/09/19  1950   SODIUM mmol/L 140 138 137   POTASSIUM mmol/L 4.3 4.2 4.3   CHLORIDE mmol/L 102 104 103   CO2 mmol/L 26.0 24.0 24.0   BUN mg/dL 28* 25* 30*   CREATININE mg/dL 1.76* 1.66* 1.65*   CALCIUM mg/dL 9.7 9.6 9.7   BILIRUBIN mg/dL  --  0.5 0.5   ALK PHOS U/L  --  57 65   ALT (SGPT) U/L  --  17 21   AST (SGOT) U/L  --  18 47*   GLUCOSE mg/dL 91 90 137*     Radiology Data:   Imaging Results (last 24 hours)     ** No results found for the last 24 hours. **          I have reviewed the patient's current medications.     Assessment/Plan     Active Hospital Problems    Diagnosis   • **Chest pain   • Acute kidney injury (CMS/HCC)   • Anemia   • Elevated TSH   • Chronic anticoagulation   • Chronic atrial fibrillation (CMS/HCC)   • Essential hypertension   • Mixed hyperlipidemia   • Stage 3 chronic kidney disease (CMS/HCC)   • Type 2 diabetes mellitus with chronic kidney disease, with long-term current use of insulin (CMS/HCC)     Plan:  1. Will give him 500 ml over 4 hours  2. Will check overnight oximetry tonight and a walk ox.   3. He will likely need outpatient sleep study and PFT's. There is some question about possible Amiodarone toxicity  4. Will hold ASA  5. Iron profile is pending. Will give empiric Venofer for 1 dose  6. Will give 1 dose of IV steroids and start doxycycline for possible infectious process.   7. Suggest given his gradual onset of shortness of breath, this is more of a chronic process.   8. T3 and T4 are  normal. Will need repeat TFT's in 4 weeks.   9. Repeat labs in am.   10. Will check room air ABG    Discharge Planning: I expect the patient to be discharged to home in 1 days.    CLARIBEL Neal   06/11/19   10:36 AM     I personally evaluated and examined the patient in conjunction with CLARIBEL Ward and agree with the assessment, treatment plan, and disposition of the patient as recorded by her. My history, exam, and further recommendations are:     Stop ASA, has completed enough triple therapy.      Recommend high-resolution CT, outpatient sleep study, and PFTs - Agree with plan developed by Alena SANFORD and Holli SANFODR.  Overnight pulse ox.    Outpatient endoscopy needed but would prefer patient to be further out from his recent stent.    Goran Byrne MD  06/11/19  9:34 PM

## 2019-06-12 PROBLEM — J96.91 RESPIRATORY FAILURE WITH HYPOXIA (HCC): Status: ACTIVE | Noted: 2019-01-01

## 2019-06-12 NOTE — PLAN OF CARE
Problem: Fall Risk (Adult)  Goal: Absence of Fall  Outcome: Ongoing (interventions implemented as appropriate)   06/12/19 0638   Fall Risk (Adult)   Absence of Fall achieves outcome       Problem: Patient Care Overview  Goal: Plan of Care Review  Outcome: Ongoing (interventions implemented as appropriate)   06/12/19 0638   Coping/Psychosocial   Plan of Care Reviewed With patient   Plan of Care Review   Progress improving   OTHER   Outcome Summary Pt states he has been breathing better. Had overnight pulse ox - study was completed but not able to download at this time. RT will notify dayshift supervisor for assistance. O2 sat in low to mid-90s on room air all shift. Pt remains in AF on tele, rate controlled 70-80s. VSS. Pt should d/c home today with home O2.        Problem: Cardiac: ACS (Acute Coronary Syndrome) (Adult)  Goal: Signs and Symptoms of Listed Potential Problems Will be Absent, Minimized or Managed (Cardiac: ACS)  Outcome: Ongoing (interventions implemented as appropriate)   06/12/19 0638   Goal/Outcome Evaluation   Problems Assessed (Acute Coronary Syndrome) all   Problems Present (Acute Coronary Syn) dysrhythmia/arrhythmia       Problem: Breathing Pattern Ineffective (Adult)  Goal: Effective Oxygenation/Ventilation  Outcome: Ongoing (interventions implemented as appropriate)   06/12/19 0638   Breathing Pattern Ineffective (Adult)   Effective Oxygenation/Ventilation making progress toward outcome     Goal: Anxiety/Fear Reduction  Outcome: Ongoing (interventions implemented as appropriate)   06/12/19 0638   Breathing Pattern Ineffective (Adult)   Anxiety/Fear Reduction achieves outcome

## 2019-06-12 NOTE — PROGRESS NOTES
"[facility] -  Progress Note     LOS: 0 days   Patient Care Team:  Kevin Latham APRN as PCP - General (Nurse Practitioner)  Gabriele Weinstein MD as Cardiologist (Cardiology)    Chief Complaint:chest pain follow up    Subjective     Interval History:   Patient did well overnight. He states he is being discharged today. He states the \"fullness\" in his chest is better. Continues complaint of CHAPARRO. Vitals remained stable overnight-AF rate controlled. Labs are stable today.    Review of Systems:   Review of Systems   Constitution: Negative for weakness, malaise/fatigue, weight gain and weight loss.   Cardiovascular: Positive for dyspnea on exertion. Negative for chest pain, irregular heartbeat, leg swelling, near-syncope, orthopnea, palpitations, paroxysmal nocturnal dyspnea and syncope.   Respiratory: Negative for cough, shortness of breath, sleep disturbances due to breathing, sputum production and wheezing.    Skin: Negative for dry skin, flushing, itching and rash.   Gastrointestinal: Negative for hematemesis and hematochezia.   Neurological: Negative for dizziness, light-headedness and loss of balance.        Objective     Vital Sign Min/Max for last 24 hours  Temp  Min: 97.5 °F (36.4 °C)  Max: 98.4 °F (36.9 °C)   BP  Min: 110/62  Max: 140/56   Pulse  Min: 63  Max: 80   Resp  Min: 16  Max: 18   SpO2  Min: 92 %  Max: 99 %   No Data Recorded   Weight  Min: 117 kg (259 lb)  Max: 117 kg (259 lb)     Telemetry: afib 70-80         06/11/19  1931   Weight: 117 kg (259 lb)         Intake/Output Summary (Last 24 hours) at 6/12/2019 0949  Last data filed at 6/11/2019 1800  Gross per 24 hour   Intake 480 ml   Output 1200 ml   Net -720 ml         Physical Exam:  Physical Exam   Constitutional: He is oriented to person, place, and time. He appears well-developed and well-nourished. No distress.   HENT:   Head: Normocephalic.   Mouth/Throat: No oropharyngeal exudate.   Eyes: Conjunctivae and EOM " are normal. Pupils are equal, round, and reactive to light. No scleral icterus.   Neck: Normal range of motion. Neck supple.   Cardiovascular: Normal rate, normal heart sounds and intact distal pulses. An irregular rhythm present.   No murmur heard.  Pulmonary/Chest: Effort normal and breath sounds normal. No respiratory distress. He has no wheezes. He has no rales. He exhibits no tenderness.   Abdominal: Soft. Bowel sounds are normal. He exhibits no distension. There is no tenderness.   Musculoskeletal: Normal range of motion. He exhibits no edema.   Neurological: He is alert and oriented to person, place, and time. He has normal reflexes.   Skin: Skin is warm and dry. No rash noted. He is not diaphoretic. No erythema. No pallor.   Psychiatric: He has a normal mood and affect. His behavior is normal.   Vitals reviewed.       Results Review:   Lab Results (last 72 hours)     Procedure Component Value Units Date/Time    Basic Metabolic Panel [[id number]]  (Abnormal) Collected:  06/12/19 0528    Specimen:  Blood Updated:  06/12/19 0620     Glucose 117 mg/dL      BUN 26 mg/dL      Creatinine 1.49 mg/dL      Sodium 142 mmol/L      Potassium 4.3 mmol/L      Chloride 106 mmol/L      CO2 25.0 mmol/L      Calcium 9.9 mg/dL      eGFR Non African Amer 46 mL/min/1.73      BUN/Creatinine Ratio 17.4     Anion Gap 11.0 mmol/L     Narrative:       GFR Normal >60  Chronic Kidney Disease <60  Kidney Failure <15    CBC & Differential [[id number]] Collected:  06/12/19 0528    Specimen:  Blood Updated:  06/12/19 0610    Narrative:       The following orders were created for panel order CBC & Differential.  Procedure                               Abnormality         Status                     ---------                               -----------         ------                     CBC Auto Differential[[id number]]        Abnormal            Final result                 Please view results for these tests on the individual orders.    CBC Auto  Differential [[id number]]  (Abnormal) Collected:  06/12/19 0528    Specimen:  Blood Updated:  06/12/19 0610     WBC 8.44 10*3/mm3      RBC 2.93 10*6/mm3      Hemoglobin 9.1 g/dL      Hematocrit 28.4 %      MCV 96.9 fL      MCH 31.1 pg      MCHC 32.0 g/dL      RDW 15.5 %      RDW-SD 55.0 fl      MPV 9.5 fL      Platelets 253 10*3/mm3      Neutrophil % 76.0 %      Lymphocyte % 10.7 %      Monocyte % 12.7 %      Eosinophil % 0.1 %      Basophil % 0.0 %      Immature Grans % 0.5 %      Neutrophils, Absolute 6.42 10*3/mm3      Lymphocytes, Absolute 0.90 10*3/mm3      Monocytes, Absolute 1.07 10*3/mm3      Eosinophils, Absolute 0.01 10*3/mm3      Basophils, Absolute 0.00 10*3/mm3      Immature Grans, Absolute 0.04 10*3/mm3      nRBC 0.0 /100 WBC     POC Glucose Once [[id number]]  (Abnormal) Collected:  06/11/19 1934    Specimen:  Blood Updated:  06/11/19 1946     Glucose 199 mg/dL      Comment: : [id number] Campbell CraigMeter ID: [id number]       POC Glucose Once [[id number]]  (Abnormal) Collected:  06/11/19 1626    Specimen:  Blood Updated:  06/11/19 1656     Glucose 153 mg/dL      Comment: : [id number] Rhett USERJOY TechnologyndaMeter ID: [id number]       Occult Blood X 1, Stool - Stool, Per Rectum [[id number]]  (Abnormal) Collected:  06/11/19 1432    Specimen:  Stool from Per Rectum Updated:  06/11/19 1613     Fecal Occult Blood Positive    POC Glucose Once [[id number]]  (Normal) Collected:  06/11/19 1132    Specimen:  Blood Updated:  06/11/19 1325     Glucose 126 mg/dL      Comment: : [id number] Rhett BrendaMeter ID: [id number]       Iron Profile [[id number]]  (Abnormal) Collected:  06/10/19 0545    Specimen:  Blood Updated:  06/11/19 1222     Iron 45 mcg/dL      TIBC 446 mcg/dL      Iron Saturation 10 %     Blood Gas, Arterial [[id number]]  (Abnormal) Collected:  06/11/19 1155    Specimen:  Arterial Blood Updated:  06/11/19 1156     Site Left Radial     Valentin's Test Positive     pH, Arterial 7.447 pH units      pCO2, Arterial  36.2 mm Hg      pO2, Arterial 64.3 mm Hg      Comment: 84 Value below reference range        HCO3, Arterial 24.9 mmol/L      Base Excess, Arterial 1.0 mmol/L      O2 Saturation, Arterial 91.4 %      Comment: 84 Value below reference range        Temperature 37.0 C      Barometric Pressure for Blood Gas 755 mmHg      Modality Room Air     FIO2 21 %      Ventilator Mode NA     Collected by [id number]     Comment: Meter: [id number]     :  [id number]       POC Glucose Once [[id number]]  (Normal) Collected:  06/11/19 0818    Specimen:  Blood Updated:  06/11/19 0852     Glucose 129 mg/dL      Comment: : [id number] Rhett Camacho ID: [id number]       Basic Metabolic Panel [[id number]]  (Abnormal) Collected:  06/11/19 0456    Specimen:  Blood Updated:  06/11/19 0620     Glucose 91 mg/dL      BUN 28 mg/dL      Creatinine 1.76 mg/dL      Sodium 140 mmol/L      Potassium 4.3 mmol/L      Chloride 102 mmol/L      CO2 26.0 mmol/L      Calcium 9.7 mg/dL      eGFR Non African Amer 38 mL/min/1.73      BUN/Creatinine Ratio 15.9     Anion Gap 12.0 mmol/L     Narrative:       GFR Normal >60  Chronic Kidney Disease <60  Kidney Failure <15    CBC & Differential [[id number]] Collected:  06/11/19 0456    Specimen:  Blood Updated:  06/11/19 0609    Narrative:       The following orders were created for panel order CBC & Differential.  Procedure                               Abnormality         Status                     ---------                               -----------         ------                     CBC Auto Differential[[id number]]        Abnormal            Final result                 Please view results for these tests on the individual orders.    CBC Auto Differential [[id number]]  (Abnormal) Collected:  06/11/19 0456    Specimen:  Blood Updated:  06/11/19 0609     WBC 5.40 10*3/mm3      RBC 3.02 10*6/mm3      Hemoglobin 9.6 g/dL      Hematocrit 30.1 %      MCV 99.7 fL      MCH 31.8 pg      MCHC 31.9 g/dL      RDW 15.5  %      RDW-SD 55.5 fl      MPV 10.0 fL      Platelets 255 10*3/mm3      Neutrophil % 58.1 %      Lymphocyte % 24.4 %      Monocyte % 14.1 %      Eosinophil % 2.8 %      Basophil % 0.4 %      Immature Grans % 0.2 %      Neutrophils, Absolute 3.14 10*3/mm3      Lymphocytes, Absolute 1.32 10*3/mm3      Monocytes, Absolute 0.76 10*3/mm3      Eosinophils, Absolute 0.15 10*3/mm3      Basophils, Absolute 0.02 10*3/mm3      Immature Grans, Absolute 0.01 10*3/mm3      nRBC 0.0 /100 WBC     T3, Free [[id number]] Collected:  06/09/19 1950    Specimen:  Blood Updated:  06/11/19 0510     T3, Free 2.2 pg/mL     Narrative:       Performed at:  [address] [place] [address]  [id number]  : Min Luu PhD, Phone:  [phone]    Hemoglobin & Hematocrit, Blood [[id number]]  (Abnormal) Collected:  06/10/19 2255    Specimen:  Blood Updated:  06/10/19 2300     Hemoglobin 9.5 g/dL      Hematocrit 28.9 %     POC Glucose Once [[id number]]  (Normal) Collected:  06/10/19 2041    Specimen:  Blood Updated:  06/10/19 2107     Glucose 117 mg/dL      Comment: : [id number] Denton ManleyMarlton Rehabilitation HospitalNona Conchis Cedeño ID: [id number]       POC Glucose Once [[id number]]  (Normal) Collected:  06/10/19 1520    Specimen:  Blood Updated:  06/10/19 1550     Glucose 91 mg/dL      Comment: : [id number] Rhett Camacho ID: [id number]       Hemoglobin & Hematocrit, Blood [[id number]]  (Abnormal) Collected:  06/10/19 1531    Specimen:  Blood Updated:  06/10/19 1541     Hemoglobin 9.5 g/dL      Hematocrit 29.3 %     POC Glucose Once [[id number]]  (Normal) Collected:  06/10/19 1125    Specimen:  Blood Updated:  06/10/19 1227     Glucose 96 mg/dL      Comment: : [id number] Rhett Camacho ID: [id number]       Vitamin B12 [[id number]]  (Normal) Collected:  06/10/19 0545    Specimen:  Blood Updated:  06/10/19 1222     Vitamin B-12 352 pg/mL     Folate [[id number]]  (Normal) Collected:  06/10/19 0545    Specimen:  Blood Updated:  06/10/19  1222     Folate 12.60 ng/mL     Ferritin [[id number]]  (Normal) Collected:  06/10/19 0545    Specimen:  Blood Updated:  06/10/19 1151     Ferritin 29.50 ng/mL     T4, Free [[id number]]  (Normal) Collected:  06/10/19 0545    Specimen:  Blood Updated:  06/10/19 1133     Free T4 1.00 ng/dL     POC Glucose Once [[id number]]  (Normal) Collected:  06/10/19 0802    Specimen:  Blood Updated:  06/10/19 0831     Glucose 93 mg/dL      Comment: : [id number] Rhett TaylorPeggyter ID: [id number]       Hemoglobin & Hematocrit, Blood [[id number]]  (Abnormal) Collected:  06/10/19 0757    Specimen:  Blood Updated:  06/10/19 0819     Hemoglobin 9.1 g/dL      Hematocrit 28.2 %     Hemoglobin A1c [[id number]] Collected:  06/10/19 0545    Specimen:  Blood Updated:  06/10/19 0741     Hemoglobin A1C 6.3 %     Narrative:       Less than 6.0           Non-Diabetic Range  6.0-7.0                 ADA Therapeutic Target  Greater than 7.0        Action Suggested    TSH [[id number]]  (Abnormal) Collected:  06/10/19 0545    Specimen:  Blood Updated:  06/10/19 0730     TSH 10.800 mIU/mL     Lipid Panel [[id number]]  (Abnormal) Collected:  06/10/19 0545    Specimen:  Blood Updated:  06/10/19 0711     Total Cholesterol 120 mg/dL      Triglycerides 135 mg/dL      HDL Cholesterol 36 mg/dL      LDL Cholesterol  61 mg/dL      LDL/HDL Ratio 1.58    Troponin [[id number]]  (Normal) Collected:  06/10/19 0545    Specimen:  Blood Updated:  06/10/19 0711     Troponin I <0.012 ng/mL     Comprehensive Metabolic Panel [[id number]]  (Abnormal) Collected:  06/10/19 0545    Specimen:  Blood Updated:  06/10/19 0710     Glucose 90 mg/dL      BUN 25 mg/dL      Creatinine 1.66 mg/dL      Sodium 138 mmol/L      Potassium 4.2 mmol/L      Chloride 104 mmol/L      CO2 24.0 mmol/L      Calcium 9.6 mg/dL      Total Protein 6.9 g/dL      Albumin 3.90 g/dL      ALT (SGPT) 17 U/L      AST (SGOT) 18 U/L      Alkaline Phosphatase 57 U/L      Total Bilirubin 0.5 mg/dL      eGFR Non   Amer 40 mL/min/1.73      Globulin 3.0 gm/dL      A/G Ratio 1.3 g/dL      BUN/Creatinine Ratio 15.1     Anion Gap 10.0 mmol/L     Narrative:       GFR Normal >60  Chronic Kidney Disease <60  Kidney Failure <15    Phosphorus [[id number]]  (Normal) Collected:  06/10/19 0545    Specimen:  Blood Updated:  06/10/19 0659     Phosphorus 4.1 mg/dL     CBC Auto Differential [[id number]]  (Abnormal) Collected:  06/10/19 0545    Specimen:  Blood Updated:  06/10/19 0637     WBC 5.34 10*3/mm3      RBC 2.94 10*6/mm3      Hemoglobin 9.2 g/dL      Hematocrit 28.7 %      MCV 97.6 fL      MCH 31.3 pg      MCHC 32.1 g/dL      RDW 15.2 %      RDW-SD 53.7 fl      MPV 9.8 fL      Platelets 220 10*3/mm3      Neutrophil % 65.7 %      Lymphocyte % 18.7 %      Monocyte % 12.2 %      Eosinophil % 2.6 %      Basophil % 0.4 %      Immature Grans % 0.4 %      Neutrophils, Absolute 3.51 10*3/mm3      Lymphocytes, Absolute 1.00 10*3/mm3      Monocytes, Absolute 0.65 10*3/mm3      Eosinophils, Absolute 0.14 10*3/mm3      Basophils, Absolute 0.02 10*3/mm3      Immature Grans, Absolute 0.02 10*3/mm3      nRBC 0.0 /100 WBC     Troponin [[id number]]  (Normal) Collected:  06/09/19 2228    Specimen:  Blood Updated:  06/09/19 2305     Troponin I <0.012 ng/mL     Hemoglobin & Hematocrit, Blood [[id number]]  (Abnormal) Collected:  06/09/19 2228    Specimen:  Blood Updated:  06/09/19 2238     Hemoglobin 8.9 g/dL      Hematocrit 27.6 %     BNP [[id number]]  (Normal) Collected:  06/09/19 1950    Specimen:  Blood Updated:  06/09/19 2123     proBNP 984.0 pg/mL     [place] Draw [[id number]] Collected:  06/09/19 1950    Specimen:  Blood Updated:  06/09/19 2101    Narrative:       The following orders were created for panel order [place] Draw.  Procedure                               Abnormality         Status                     ---------                               -----------         ------                     Light Blue Top[[id number]]                                    Final result               Green Top (Gel)[[id number]]                                  Final result               Lavender Top[[id number]]                                     Final result               Red Top[[id number]]                                          Final result                 Please view results for these tests on the individual orders.    Light Blue Top [[id number]] Collected:  06/09/19 1950    Specimen:  Blood Updated:  06/09/19 2101     Extra Tube hold for add-on     Comment: Auto resulted       Green Top (Gel) [[id number]] Collected:  06/09/19 1950    Specimen:  Blood Updated:  06/09/19 2101     Extra Tube Hold for add-ons.     Comment: Auto resulted.       Lavender Top [[id number]] Collected:  06/09/19 1950    Specimen:  Blood Updated:  06/09/19 2101     Extra Tube hold for add-on     Comment: Auto resulted       Red Top [[id number]] Collected:  06/09/19 1950    Specimen:  Blood Updated:  06/09/19 2101     Extra Tube Hold for add-ons.     Comment: Auto resulted.       POC Occult Blood Stool [[id number]]  (Normal) Collected:  06/09/19 2029    Specimen:  Stool Updated:  06/09/19 2030     Fecal Occult Blood Negative     Lot Number [id number]     Expiration Date 2/29/2020     DEVELOPER LOT NUMBER [id number]     DEVELOPER EXPIRATION DATE 2/29/2020     Positive Control Positive     Negative Control Negative    Troponin [[id number]]  (Normal) Collected:  06/09/19 1950    Specimen:  Blood Updated:  06/09/19 2029     Troponin I <0.012 ng/mL     Comprehensive Metabolic Panel [[id number]]  (Abnormal) Collected:  06/09/19 1950    Specimen:  Blood Updated:  06/09/19 2018     Glucose 137 mg/dL      BUN 30 mg/dL      Creatinine 1.65 mg/dL      Sodium 137 mmol/L      Potassium 4.3 mmol/L      Chloride 103 mmol/L      CO2 24.0 mmol/L      Calcium 9.7 mg/dL      Total Protein 7.7 g/dL      Albumin 4.30 g/dL      ALT (SGPT) 21 U/L      AST (SGOT) 47 U/L      Alkaline Phosphatase 65 U/L      Total Bilirubin 0.5 mg/dL      eGFR Non   Amer 41 mL/min/1.73      Globulin 3.4 gm/dL      A/G Ratio 1.3 g/dL      BUN/Creatinine Ratio 18.2     Anion Gap 10.0 mmol/L     Narrative:       GFR Normal >60  Chronic Kidney Disease <60  Kidney Failure <15    CBC & Differential [[id number]] Collected:  06/09/19 1950    Specimen:  Blood Updated:  06/09/19 2009    Narrative:       The following orders were created for panel order CBC & Differential.  Procedure                               Abnormality         Status                     ---------                               -----------         ------                     CBC Auto Differential[[id number]]        Abnormal            Final result                 Please view results for these tests on the individual orders.    CBC Auto Differential [[id number]]  (Abnormal) Collected:  06/09/19 1950    Specimen:  Blood Updated:  06/09/19 2009     WBC 5.26 10*3/mm3      RBC 2.95 10*6/mm3      Hemoglobin 9.2 g/dL      Hematocrit 28.0 %      MCV 94.9 fL      MCH 31.2 pg      MCHC 32.9 g/dL      RDW 15.2 %      RDW-SD 52.7 fl      MPV 9.7 fL      Platelets 240 10*3/mm3      Neutrophil % 59.6 %      Lymphocyte % 23.2 %      Monocyte % 13.3 %      Eosinophil % 2.5 %      Basophil % 0.8 %      Immature Grans % 0.6 %      Neutrophils, Absolute 3.14 10*3/mm3      Lymphocytes, Absolute 1.22 10*3/mm3      Monocytes, Absolute 0.70 10*3/mm3      Eosinophils, Absolute 0.13 10*3/mm3      Basophils, Absolute 0.04 10*3/mm3      Immature Grans, Absolute 0.03 10*3/mm3      nRBC 0.0 /100 WBC               Echo EF Estimated  No results found for: ECHOEFEST      Cath Ejection Fraction Quantitative  No results found for: CATHEF        Medication Review: yes  Current Facility-Administered Medications   Medication Dose Route Frequency Provider Last Rate Last Dose   • atorvastatin (LIPITOR) tablet 40 mg  40 mg Oral Nightly Flako Edwards DO   40 mg at 06/11/19 2023   • dextrose (GLUTOSE) oral gel 15 g  15 g Oral Q15 Min PRN Flako Edwards  DO Kelly       • diazePAM (VALIUM) tablet 5 mg  5 mg Oral Nightly PRN Flako Edwards DO       • doxycycline (ADOXA) tablet 100 mg  100 mg Oral Q12H Yue Leary APRN   100 mg at 06/12/19 0828   • fenofibrate (TRICOR) tablet 145 mg  145 mg Oral Daily Flako Edwards DO   145 mg at 06/12/19 0828   • insulin detemir (LEVEMIR) injection 80 Units  80 Units Subcutaneous Nightly Flako Edwards DO   80 Units at 06/11/19 2022   • insulin lispro (humaLOG) injection 2-7 Units  2-7 Units Subcutaneous 4x Daily With Meals & Nightly Flako Edwards DO   2 Units at 06/11/19 1719   • ipratropium (ATROVENT) nebulizer solution 0.5 mg  0.5 mg Nebulization 4x Daily - RT Yue Leary APRN   0.5 mg at 06/12/19 0810   • isosorbide mononitrate (IMDUR) 24 hr tablet 120 mg  120 mg Oral Daily Flako Edwards DO   120 mg at 06/12/19 0828   • lisinopril (PRINIVIL,ZESTRIL) tablet 5 mg  5 mg Oral Daily Flako Edwards DO   5 mg at 06/12/19 0829   • metoprolol succinate XL (TOPROL-XL) 24 hr tablet 75 mg  75 mg Oral Daily Flako Edwards DO   75 mg at 06/12/19 0828   • OCUVITE-LUTEIN (OCUVITE) tablet 1 tablet  1 tablet Oral Daily Flako Edwards DO   1 tablet at 06/12/19 0828   • ondansetron (ZOFRAN) injection 4 mg  4 mg Intravenous Q6H PRN Flako Edwards DO       • pantoprazole (PROTONIX) EC tablet 40 mg  40 mg Oral Daily Yue Leary APRN   40 mg at 06/12/19 0828   • potassium chloride (MICRO-K) CR capsule 10 mEq  10 mEq Oral Daily Flako Edwards DO   10 mEq at 06/12/19 0828   • ranolazine (RANEXA) 12 hr tablet 1,000 mg  1,000 mg Oral BID Flako Edwards DO   1,000 mg at 06/12/19 0828   • rivaroxaban (XARELTO) tablet 20 mg  20 mg Oral Daily With Dinner Alena Humphries APRN   20 mg at 06/11/19 1721   • sertraline (ZOLOFT) tablet 25 mg  25 mg Oral Daily Flako Edwards DO   25 mg at 06/12/19 0828   • sodium bicarbonate tablet 650 mg  650 mg Oral BID Jerry  Flako Mendoza DO   650 mg at 06/12/19 0828   • sodium chloride 0.9 % flush 10 mL  10 mL Intravenous PRN Alexander Rodriguez MD       • sodium chloride 0.9 % flush 3 mL  3 mL Intravenous Q12H Flako Edwards DO   3 mL at 06/12/19 0827   • sodium chloride 0.9 % flush 3-10 mL  3-10 mL Intravenous PRN Flako Edwards DO       • tamsulosin (FLOMAX) 24 hr capsule 0.4 mg  0.4 mg Oral Daily Flako Edwards DO   0.4 mg at 06/12/19 0829   • ticagrelor (BRILINTA) tablet 90 mg  90 mg Oral BID Flako Edwards DO   90 mg at 06/12/19 0829   • vitamin E capsule 1,000 Units  1,000 Units Oral Daily Flako Edwards DO   1,000 Units at 06/12/19 0828         Assessment/Plan       Chest pain    Stage 3 chronic kidney disease (CMS/HCC)    Mixed hyperlipidemia    Type 2 diabetes mellitus with chronic kidney disease, with long-term current use of insulin (CMS/[facility])    Essential hypertension    Chronic atrial fibrillation (CMS/HCC)    Chronic anticoagulation    Anemia    Elevated TSH    Acute kidney injury (CMS/HCC)    Respiratory failure with hypoxia (CMS/[facility])      Plan:  Ok with cardiology to d/c home.   Echo showed EF of 61-65% with mild mitral and tricuspid valve regurgitation. No need for heart cath at this time.  Walking oximetry completed. PFTs scheduled for today. Will defer to primary.    Continue Xarelto 20mg.   D/c ASA and Amiodarone.   Continue cardiac meds including Toprolol, lisinopril, Imdur, Ranexa, lipitor, and lasix.  Keep f/u with NP on 6/19.     Further recommendations per Dr. Weinstein.    Roberta Sprague, APRN  06/12/19  9:49 AM

## 2019-06-12 NOTE — DISCHARGE SUMMARY
[facility] Medicine Services  DISCHARGE SUMMARY       Date of Admission: 6/9/2019  Date of Discharge:  6/12/2019  Primary Care Physician: Kevin Latham APRN    Presenting Problem/History of Present Illness:  Progressive shortness of breath.     Final Discharge Diagnoses:  • **Chest pain   • Acute kidney injury (CMS/HCC)   • Anemia   • Elevated TSH-normal T3, T4   • Chronic anticoagulation   • Chronic atrial fibrillation (CMS/HCC)   • Essential hypertension   • Mixed hyperlipidemia   • Stage 3 chronic kidney disease (CMS/HCC)   • Type 2 diabetes mellitus with chronic kidney disease, with long-term current use of insulin (CMS/HCC)    *Respiratory failure with hypoxia    *Hemoccult positive stool.      Consults:   1. Dr. Gabriele Weinstein-cardiology    Procedures Performed:     Pertinent Test Results:   Imaging Results (last 7 days)     Procedure Component Value Units Date/Time    XR Chest 1 View [[id number]] Collected:  06/09/19 2005     Updated:  06/09/19 2009    Narrative:       EXAMINATION: XR CHEST 1 VW-     6/9/2019 7:57 PM CDT     HISTORY: Chest Pain Triage Protocol     1 view chest x-ray compared with 01/23/2019.     Low lung volumes with chronic lung changes. Prominence of parenchymal  markings though overall slightly better expansion and clear lungs now as  compared with 5 months ago. There are some patchy opacity within the  retrocardiac left lower lobe though this does not appear to be any  different from the previous exam and probably represents chronic change.     Summary:  1. Chronic lung changes with no focal acute lung disease.  This report was finalized on 06/09/2019 20:06 by Dr. Krishan Hoover MD.        Lab Results (last 7 days)     Procedure Component Value Units Date/Time    Basic Metabolic Panel [[id number]]  (Abnormal) Collected:  06/12/19 0528    Specimen:  Blood Updated:  06/12/19 0620     Glucose 117 mg/dL      BUN 26 mg/dL      Creatinine 1.49 mg/dL       Sodium 142 mmol/L      Potassium 4.3 mmol/L      Chloride 106 mmol/L      CO2 25.0 mmol/L      Calcium 9.9 mg/dL      eGFR Non African Amer 46 mL/min/1.73      BUN/Creatinine Ratio 17.4     Anion Gap 11.0 mmol/L     Narrative:       GFR Normal >60  Chronic Kidney Disease <60  Kidney Failure <15    CBC & Differential [[id number]] Collected:  06/12/19 0528    Specimen:  Blood Updated:  06/12/19 0610    Narrative:       The following orders were created for panel order CBC & Differential.  Procedure                               Abnormality         Status                     ---------                               -----------         ------                     CBC Auto Differential[[id number]]        Abnormal            Final result                 Please view results for these tests on the individual orders.    CBC Auto Differential [[id number]]  (Abnormal) Collected:  06/12/19 0528    Specimen:  Blood Updated:  06/12/19 0610     WBC 8.44 10*3/mm3      RBC 2.93 10*6/mm3      Hemoglobin 9.1 g/dL      Hematocrit 28.4 %      MCV 96.9 fL      MCH 31.1 pg      MCHC 32.0 g/dL      RDW 15.5 %      RDW-SD 55.0 fl      MPV 9.5 fL      Platelets 253 10*3/mm3      Neutrophil % 76.0 %      Lymphocyte % 10.7 %      Monocyte % 12.7 %      Eosinophil % 0.1 %      Basophil % 0.0 %      Immature Grans % 0.5 %      Neutrophils, Absolute 6.42 10*3/mm3      Lymphocytes, Absolute 0.90 10*3/mm3      Monocytes, Absolute 1.07 10*3/mm3      Eosinophils, Absolute 0.01 10*3/mm3      Basophils, Absolute 0.00 10*3/mm3      Immature Grans, Absolute 0.04 10*3/mm3      nRBC 0.0 /100 WBC     POC Glucose Once [[id number]]  (Abnormal) Collected:  06/11/19 1934    Specimen:  Blood Updated:  06/11/19 1946     Glucose 199 mg/dL      Comment: : [id number] Shannan CraigMeter ID: [id number]       POC Glucose Once [[id number]]  (Abnormal) Collected:  06/11/19 1626    Specimen:  Blood Updated:  06/11/19 1656     Glucose 153 mg/dL      Comment: :  [id number] Rhett FernandezaMeter ID: [id number]       Occult Blood X 1, Stool - Stool, Per Rectum [[id number]]  (Abnormal) Collected:  06/11/19 1432    Specimen:  Stool from Per Rectum Updated:  06/11/19 1613     Fecal Occult Blood Positive    POC Glucose Once [[id number]]  (Normal) Collected:  06/11/19 1132    Specimen:  Blood Updated:  06/11/19 1325     Glucose 126 mg/dL      Comment: : [id number] Rhett FernandezaMeter ID: [id number]       Iron Profile [[id number]]  (Abnormal) Collected:  06/10/19 0545    Specimen:  Blood Updated:  06/11/19 1222     Iron 45 mcg/dL      TIBC 446 mcg/dL      Iron Saturation 10 %     Blood Gas, Arterial [[id number]]  (Abnormal) Collected:  06/11/19 1155    Specimen:  Arterial Blood Updated:  06/11/19 1156     Site Left Radial     Valentin's Test Positive     pH, Arterial 7.447 pH units      pCO2, Arterial 36.2 mm Hg      pO2, Arterial 64.3 mm Hg      Comment: 84 Value below reference range        HCO3, Arterial 24.9 mmol/L      Base Excess, Arterial 1.0 mmol/L      O2 Saturation, Arterial 91.4 %      Comment: 84 Value below reference range        Temperature 37.0 C      Barometric Pressure for Blood Gas 755 mmHg      Modality Room Air     FIO2 21 %      Ventilator Mode NA     Collected by [id number]     Comment: Meter: [id number]     :  [id number]       POC Glucose Once [[id number]]  (Normal) Collected:  06/11/19 0818    Specimen:  Blood Updated:  06/11/19 0852     Glucose 129 mg/dL      Comment: : [id number] Rhett FernandezaMeter ID: [id number]       Basic Metabolic Panel [[id number]]  (Abnormal) Collected:  06/11/19 0456    Specimen:  Blood Updated:  06/11/19 0620     Glucose 91 mg/dL      BUN 28 mg/dL      Creatinine 1.76 mg/dL      Sodium 140 mmol/L      Potassium 4.3 mmol/L      Chloride 102 mmol/L      CO2 26.0 mmol/L      Calcium 9.7 mg/dL      eGFR Non African Amer 38 mL/min/1.73      BUN/Creatinine Ratio 15.9     Anion Gap 12.0 mmol/L     Narrative:       GFR Normal >60  Chronic  Kidney Disease <60  Kidney Failure <15    CBC & Differential [[id number]] Collected:  06/11/19 0456    Specimen:  Blood Updated:  06/11/19 0609    Narrative:       The following orders were created for panel order CBC & Differential.  Procedure                               Abnormality         Status                     ---------                               -----------         ------                     CBC Auto Differential[[id number]]        Abnormal            Final result                 Please view results for these tests on the individual orders.    CBC Auto Differential [[id number]]  (Abnormal) Collected:  06/11/19 0456    Specimen:  Blood Updated:  06/11/19 0609     WBC 5.40 10*3/mm3      RBC 3.02 10*6/mm3      Hemoglobin 9.6 g/dL      Hematocrit 30.1 %      MCV 99.7 fL      MCH 31.8 pg      MCHC 31.9 g/dL      RDW 15.5 %      RDW-SD 55.5 fl      MPV 10.0 fL      Platelets 255 10*3/mm3      Neutrophil % 58.1 %      Lymphocyte % 24.4 %      Monocyte % 14.1 %      Eosinophil % 2.8 %      Basophil % 0.4 %      Immature Grans % 0.2 %      Neutrophils, Absolute 3.14 10*3/mm3      Lymphocytes, Absolute 1.32 10*3/mm3      Monocytes, Absolute 0.76 10*3/mm3      Eosinophils, Absolute 0.15 10*3/mm3      Basophils, Absolute 0.02 10*3/mm3      Immature Grans, Absolute 0.01 10*3/mm3      nRBC 0.0 /100 WBC     T3, Free [[id number]] Collected:  06/09/19 1950    Specimen:  Blood Updated:  06/11/19 0510     T3, Free 2.2 pg/mL     Narrative:       Performed at:  [address] [place] [address]  [id number]  : Min Luu PhD, Phone:  [phone]    Hemoglobin & Hematocrit, Blood [[id number]]  (Abnormal) Collected:  06/10/19 2255    Specimen:  Blood Updated:  06/10/19 2300     Hemoglobin 9.5 g/dL      Hematocrit 28.9 %     POC Glucose Once [[id number]]  (Normal) Collected:  06/10/19 2041    Specimen:  Blood Updated:  06/10/19 2107     Glucose 117 mg/dL      Comment: : [id number] Chawla (Quarles)  Conchis Cedeño ID: [id number]       POC Glucose Once [[id number]]  (Normal) Collected:  06/10/19 1520    Specimen:  Blood Updated:  06/10/19 1550     Glucose 91 mg/dL      Comment: : [id number] Rhett Camacho ID: [id number]       Hemoglobin & Hematocrit, Blood [[id number]]  (Abnormal) Collected:  06/10/19 1531    Specimen:  Blood Updated:  06/10/19 1541     Hemoglobin 9.5 g/dL      Hematocrit 29.3 %     POC Glucose Once [[id number]]  (Normal) Collected:  06/10/19 1125    Specimen:  Blood Updated:  06/10/19 1227     Glucose 96 mg/dL      Comment: : [id number] Rhett Camacho ID: [id number]       Vitamin B12 [[id number]]  (Normal) Collected:  06/10/19 0545    Specimen:  Blood Updated:  06/10/19 1222     Vitamin B-12 352 pg/mL     Folate [[id number]]  (Normal) Collected:  06/10/19 0545    Specimen:  Blood Updated:  06/10/19 1222     Folate 12.60 ng/mL     Ferritin [[id number]]  (Normal) Collected:  06/10/19 0545    Specimen:  Blood Updated:  06/10/19 1151     Ferritin 29.50 ng/mL     T4, Free [[id number]]  (Normal) Collected:  06/10/19 0545    Specimen:  Blood Updated:  06/10/19 1133     Free T4 1.00 ng/dL     POC Glucose Once [[id number]]  (Normal) Collected:  06/10/19 0802    Specimen:  Blood Updated:  06/10/19 0831     Glucose 93 mg/dL      Comment: : [id number] Rhett Camacho ID: [id number]       Hemoglobin & Hematocrit, Blood [[id number]]  (Abnormal) Collected:  06/10/19 0757    Specimen:  Blood Updated:  06/10/19 0819     Hemoglobin 9.1 g/dL      Hematocrit 28.2 %     Hemoglobin A1c [[id number]] Collected:  06/10/19 0545    Specimen:  Blood Updated:  06/10/19 0741     Hemoglobin A1C 6.3 %     Narrative:       Less than 6.0           Non-Diabetic Range  6.0-7.0                 ADA Therapeutic Target  Greater than 7.0        Action Suggested    TSH [[id number]]  (Abnormal) Collected:  06/10/19 0545    Specimen:  Blood Updated:  06/10/19 0730     TSH 10.800 mIU/mL     Lipid Panel [[id number]]  (Abnormal)  Collected:  06/10/19 0545    Specimen:  Blood Updated:  06/10/19 0711     Total Cholesterol 120 mg/dL      Triglycerides 135 mg/dL      HDL Cholesterol 36 mg/dL      LDL Cholesterol  61 mg/dL      LDL/HDL Ratio 1.58    Troponin [[id number]]  (Normal) Collected:  06/10/19 0545    Specimen:  Blood Updated:  06/10/19 0711     Troponin I <0.012 ng/mL     Comprehensive Metabolic Panel [[id number]]  (Abnormal) Collected:  06/10/19 0545    Specimen:  Blood Updated:  06/10/19 0710     Glucose 90 mg/dL      BUN 25 mg/dL      Creatinine 1.66 mg/dL      Sodium 138 mmol/L      Potassium 4.2 mmol/L      Chloride 104 mmol/L      CO2 24.0 mmol/L      Calcium 9.6 mg/dL      Total Protein 6.9 g/dL      Albumin 3.90 g/dL      ALT (SGPT) 17 U/L      AST (SGOT) 18 U/L      Alkaline Phosphatase 57 U/L      Total Bilirubin 0.5 mg/dL      eGFR Non African Amer 40 mL/min/1.73      Globulin 3.0 gm/dL      A/G Ratio 1.3 g/dL      BUN/Creatinine Ratio 15.1     Anion Gap 10.0 mmol/L     Narrative:       GFR Normal >60  Chronic Kidney Disease <60  Kidney Failure <15    Phosphorus [[id number]]  (Normal) Collected:  06/10/19 0545    Specimen:  Blood Updated:  06/10/19 0659     Phosphorus 4.1 mg/dL     CBC Auto Differential [[id number]]  (Abnormal) Collected:  06/10/19 0545    Specimen:  Blood Updated:  06/10/19 0637     WBC 5.34 10*3/mm3      RBC 2.94 10*6/mm3      Hemoglobin 9.2 g/dL      Hematocrit 28.7 %      MCV 97.6 fL      MCH 31.3 pg      MCHC 32.1 g/dL      RDW 15.2 %      RDW-SD 53.7 fl      MPV 9.8 fL      Platelets 220 10*3/mm3      Neutrophil % 65.7 %      Lymphocyte % 18.7 %      Monocyte % 12.2 %      Eosinophil % 2.6 %      Basophil % 0.4 %      Immature Grans % 0.4 %      Neutrophils, Absolute 3.51 10*3/mm3      Lymphocytes, Absolute 1.00 10*3/mm3      Monocytes, Absolute 0.65 10*3/mm3      Eosinophils, Absolute 0.14 10*3/mm3      Basophils, Absolute 0.02 10*3/mm3      Immature Grans, Absolute 0.02 10*3/mm3      nRBC 0.0 /100 WBC      Troponin [[id number]]  (Normal) Collected:  06/09/19 2228    Specimen:  Blood Updated:  06/09/19 2305     Troponin I <0.012 ng/mL     Hemoglobin & Hematocrit, Blood [[id number]]  (Abnormal) Collected:  06/09/19 2228    Specimen:  Blood Updated:  06/09/19 2238     Hemoglobin 8.9 g/dL      Hematocrit 27.6 %     BNP [[id number]]  (Normal) Collected:  06/09/19 1950    Specimen:  Blood Updated:  06/09/19 2123     proBNP 984.0 pg/mL     [place] Draw [[id number]] Collected:  06/09/19 1950    Specimen:  Blood Updated:  06/09/19 2101    Narrative:       The following orders were created for panel order [place] Draw.  Procedure                               Abnormality         Status                     ---------                               -----------         ------                     Light Blue Top[[id number]]                                   Final result               Green Top (Gel)[[id number]]                                  Final result               Lavender Top[[id number]]                                     Final result               Red Top[[id number]]                                          Final result                 Please view results for these tests on the individual orders.    Light Blue Top [[id number]] Collected:  06/09/19 1950    Specimen:  Blood Updated:  06/09/19 2101     Extra Tube hold for add-on     Comment: Auto resulted       Green Top (Gel) [[id number]] Collected:  06/09/19 1950    Specimen:  Blood Updated:  06/09/19 2101     Extra Tube Hold for add-ons.     Comment: Auto resulted.       Lavender Top [[id number]] Collected:  06/09/19 1950    Specimen:  Blood Updated:  06/09/19 2101     Extra Tube hold for add-on     Comment: Auto resulted       Red Top [[id number]] Collected:  06/09/19 1950    Specimen:  Blood Updated:  06/09/19 2101     Extra Tube Hold for add-ons.     Comment: Auto resulted.       POC Occult Blood Stool [[id number]]  (Normal) Collected:  06/09/19 2029    Specimen:  Stool Updated:   06/09/19 2030     Fecal Occult Blood Negative     Lot Number [id number]     Expiration Date 2/29/2020     DEVELOPER LOT NUMBER [id number]     DEVELOPER EXPIRATION DATE 2/29/2020     Positive Control Positive     Negative Control Negative    Troponin [[id number]]  (Normal) Collected:  06/09/19 1950    Specimen:  Blood Updated:  06/09/19 2029     Troponin I <0.012 ng/mL     Comprehensive Metabolic Panel [[id number]]  (Abnormal) Collected:  06/09/19 1950    Specimen:  Blood Updated:  06/09/19 2018     Glucose 137 mg/dL      BUN 30 mg/dL      Creatinine 1.65 mg/dL      Sodium 137 mmol/L      Potassium 4.3 mmol/L      Chloride 103 mmol/L      CO2 24.0 mmol/L      Calcium 9.7 mg/dL      Total Protein 7.7 g/dL      Albumin 4.30 g/dL      ALT (SGPT) 21 U/L      AST (SGOT) 47 U/L      Alkaline Phosphatase 65 U/L      Total Bilirubin 0.5 mg/dL      eGFR Non African Amer 41 mL/min/1.73      Globulin 3.4 gm/dL      A/G Ratio 1.3 g/dL      BUN/Creatinine Ratio 18.2     Anion Gap 10.0 mmol/L     Narrative:       GFR Normal >60  Chronic Kidney Disease <60  Kidney Failure <15    CBC & Differential [[id number]] Collected:  06/09/19 1950    Specimen:  Blood Updated:  06/09/19 2009    Narrative:       The following orders were created for panel order CBC & Differential.  Procedure                               Abnormality         Status                     ---------                               -----------         ------                     CBC Auto Differential[[id number]]        Abnormal            Final result                 Please view results for these tests on the individual orders.    CBC Auto Differential [[id number]]  (Abnormal) Collected:  06/09/19 1950    Specimen:  Blood Updated:  06/09/19 2009     WBC 5.26 10*3/mm3      RBC 2.95 10*6/mm3      Hemoglobin 9.2 g/dL      Hematocrit 28.0 %      MCV 94.9 fL      MCH 31.2 pg      MCHC 32.9 g/dL      RDW 15.2 %      RDW-SD 52.7 fl      MPV 9.7 fL      Platelets 240 10*3/mm3      Neutrophil % 59.6  %      Lymphocyte % 23.2 %      Monocyte % 13.3 %      Eosinophil % 2.5 %      Basophil % 0.8 %      Immature Grans % 0.6 %      Neutrophils, Absolute 3.14 10*3/mm3      Lymphocytes, Absolute 1.22 10*3/mm3      Monocytes, Absolute 0.70 10*3/mm3      Eosinophils, Absolute 0.13 10*3/mm3      Basophils, Absolute 0.04 10*3/mm3      Immature Grans, Absolute 0.03 10*3/mm3      nRBC 0.0 /100 WBC         Hospital Course:  The patient is a 79 y.o. male who follows with Zoila Latham for his primary care.  He has a past medical history significant for coronary artery disease, chronic kidney disease stage III, chronic atrial fibrillation on chronic anticoagulation with Xarelto, hypertension, hyperlipidemia, and insulin-dependent diabetes mellitus.  Patient presented to the emergency department on 6/9/2019 with complaints of chest pain.  Patient actually admits to having progressive shortness of breath over the last 2 to 3 days.  He started having some substernal chest pain radiating to the left arm on day of presentation.  In the emergency department, he was noted to have a hemoglobin of 9.2 was Hemoccult negative.  His troponin was negative.  He was admitted to the hospitalist service for further evaluation and management.    He was noted to have some acute kidney injury we continue to monitor ultimately gave him 500 mL of fluid over about 4 hours yesterday.  His creatinine is 1.48 is back to his baseline.  He was seen in consultation by Dr. Gabriele Weinstein with cardiology.  He underwent Lexiscan which did not reveal any new ischemia.  Please note the patient recently had coronary artery catheterization with stenting in January.  He has been on dual antiplatelet therapy as well as anticoagulation since that time.    It was noted that he had had such a decline in his hemoglobin which continued to decline.  Initial Hemoccult stool was negative repeat was Hemoccult positive.  This was discussed with the patient in detail  "given the fact that he has just had PCI.  He will not be able to stop his Brilinta for a minimum of 1 year.  We have held his aspirin this hospitalization as greater than 1 month since intervention.  He will need to have frequent monitoring of his blood counts.  I have given him IV Venofer x1 he will recheck over-the-counter iron post discharge.  He felt like he likely has AVMs or a \"slow leak\" from his GI tract given the dual antiplatelet therapy as well as anticoagulation. Again, the ASA has been discontinued.     Medications have been altered including discontinuing of amiodarone given its potential for pulmonary toxicity.  I did give him a dose of steroids yesterday and started him on oral doxycycline despite a negative chest x-ray    He was given breathing treatments and tells me that his breathing does feel somewhat better since admission.  Resting and exercise pulse oximetry does reveal need for 2 L of exertional oxygen.  He did drop to 87%.Given his central obesity and thick neck, he is at risk for sleep apnea. He does not qualify for nocturnal oxygen per sleep study but would still recommend sleep study.     His TSH was elevated; however T3, T4 were both normal. No changes were made.     Physical Exam on Discharge:  /61 (BP Location: Right arm, Patient Position: Lying)   Pulse 79   Temp 98 °F (36.7 °C) (Oral)   Resp 16   Ht 185.4 cm (73\")   Wt 117 kg (259 lb)   SpO2 95%   BMI 34.17 kg/m²   Physical Exam   Constitutional: He is oriented to person, place, and time. He appears well-developed and well-nourished.   obese   HENT:   Head: Normocephalic and atraumatic.   Eyes: EOM are normal. Pupils are equal, round, and reactive to light. No scleral icterus.   Neck: Normal range of motion. Neck supple. No JVD present. Carotid bruit is not present. No tracheal deviation present. No thyromegaly present.   Cardiovascular: Normal rate. Exam reveals no gallop and no friction rub.   No murmur heard.  afib " 71-80   Pulmonary/Chest: Effort normal. No respiratory distress. He has no wheezes. He has rales. He exhibits no tenderness.   Abdominal: Soft. Bowel sounds are normal. He exhibits no distension. There is no tenderness.   Musculoskeletal: Normal range of motion. He exhibits no edema.   Neurological: He is alert and oriented to person, place, and time. No cranial nerve deficit.   Skin: Skin is warm and dry. No rash noted.   Psychiatric: He has a normal mood and affect.     Condition on Discharge: stable.     Discharge Disposition:  Home or Self Care    Discharge Medications:     Discharge Medications      New Medications      Instructions Start Date   doxycycline 100 MG tablet  Commonly known as:  ADOXA   100 mg, Oral, Every 12 Hours Scheduled         Changes to Medications      Instructions Start Date   rivaroxaban 20 MG tablet  Commonly known as:  XARELTO  What changed:    · medication strength  · how much to take  · when to take this   20 mg, Oral, Daily With Dinner         Continue These Medications      Instructions Start Date   atorvastatin 40 MG tablet  Commonly known as:  LIPITOR   40 mg, Oral, Nightly      azelastine 0.1 % nasal spray  Commonly known as:  ASTELIN   2 sprays, Nasal, 2 Times Daily PRN, Use in each nostril as directed       diazePAM 5 MG tablet  Commonly known as:  VALIUM   5 mg, Oral, Daily PRN      fenofibrate 145 MG tablet  Commonly known as:  TRICOR   145 mg, Oral, Daily      Fish Oil 1200 MG capsule delayed-release   1,200 mg, Oral, 2 Times Daily      furosemide 40 MG tablet  Commonly known as:  LASIX   40 mg, Oral, Daily      Insulin Glargine 100 UNIT/ML injection pen  Commonly known as:  BASAGLAR KWIKPEN   80 Units, Subcutaneous, Nightly      isosorbide mononitrate 120 MG 24 hr tablet  Commonly known as:  IMDUR   120 mg, Oral, Daily      lisinopril 5 MG tablet  Commonly known as:  PRINIVIL,ZESTRIL   5 mg, Oral, Daily      metoprolol succinate XL 25 MG 24 hr tablet  Commonly known as:   TOPROL-XL   75 mg, Oral, Daily      MULTIVITAMIN ADULT PO   1 tablet, Oral, Daily      nitroglycerin 0.4 MG SL tablet  Commonly known as:  NITROSTAT   0.4 mg, Sublingual, Every 5 Minutes PRN, Take no more than 3 doses in 15 minutes.      NOVOLIN R 100 UNIT/ML injection  Generic drug:  insulin regular   Subcutaneous, Daily, BG - 100 divided by 20.      pantoprazole 20 MG EC tablet  Commonly known as:  PROTONIX   20 mg, Oral, Daily      potassium chloride 10 MEQ CR tablet  Commonly known as:  K-DUR,KLOR-CON   10 mEq, Oral, Daily      ranolazine 1000 MG 12 hr tablet  Commonly known as:  RANEXA   1,000 mg, Oral, 2 Times Daily      sertraline 25 MG tablet  Commonly known as:  ZOLOFT   25 mg, Oral, Daily      sodium bicarbonate 650 MG tablet   650 mg, Oral, 2 Times Daily      tamsulosin 0.4 MG capsule 24 hr capsule  Commonly known as:  FLOMAX   1 capsule, Oral, Daily      ticagrelor 90 MG tablet tablet  Commonly known as:  BRILINTA   90 mg, Oral, 2 Times Daily      vitamin D 95167 units capsule capsule  Commonly known as:  ERGOCALCIFEROL   50,000 Units, Oral, Weekly, Monday.      vitamin E 1000 UNIT capsule   1,000 Units, Oral, Daily         Stop These Medications    amiodarone 200 MG tablet  Commonly known as:  PACERONE     aspirin 81 MG chewable tablet          Discharge Diet:   Diet Instructions     Diet: Consistent Carbohydrate, Cardiac; Thin      Discharge Diet:   Consistent Carbohydrate  Cardiac       Fluid Consistency:  Thin        Activity at Discharge:   Activity Instructions     Activity as Tolerated            Follow-up Appointments:   Future Appointments   Date Time Provider Department Center   6/13/2019  1:00 PM PHASE II - CARD REHAB  [facility]  [facility]   6/17/2019  1:00 PM PHASE II - CARD REHAB [facility]   6/19/2019 11:00 AM [facility]   6/20/2019  1:00 PM PHASE II - CARD REHAB [facility]     1. Dr. Weinstein-GEORGINA 6/19  2. Kevin Latham-1 week. He  will need oupatient sleep study as well as routine CBC  3. Outpatient PFT's in 1 month.     Test Results Pending at Discharge: none    CLARIBEL Neal  06/12/19  3:59 PM    Time: 45 minutes.     I personally evaluated and examined the patient in conjunction with CLARIBEL Ward and agree with the assessment, treatment plan, and disposition of the patient as recorded by her. My history, exam, and further recommendations are:     Agree with plan as above.  Patient feeling some better but still having exertional shortness of breath.  Patient denies any chest pain.  Patient understands plan.  Examination overall benign.    Goran Byrne MD  06/12/19  9:36 PM

## 2019-06-12 NOTE — PROGRESS NOTES
Continued Stay Note  [facility]     Patient Name: Ha Agrawal  MRN: [id number]  Today's Date: 6/12/2019    Admit Date: 6/9/2019    Discharge Plan     Row Name 06/12/19 1027       Plan    Patient/Family in Agreement with Plan  yes    Final Discharge Disposition Code  01 - home or self-care    Final Note  Pt was provided a list of DME agenices and chose [facility].  SW has sent referral, they will bring portable tank to pts room shortly.  Once tank is recieved, pt can dc.  BHARGAVI Dos Santos.    Row Name 06/12/19 1026       Plan    Patient/Family in Agreement with Plan  yes    Final Discharge Disposition Code  01 - home or self-care        Discharge Codes    No documentation.       Expected Discharge Date and Time     Expected Discharge Date Expected Discharge Time    Jun 12, 2019             BHARGAVI Don

## 2019-06-12 NOTE — DISCHARGE PLACEMENT REQUEST
"From:  Carly aBss, BHARGAVI  [phone]    Aroldo Rodríguez (79 y.o. Male)     Date of Birth Social Security Number Address Home Phone MRN    1940  [address] [place] [address] [phone] [id number]    Taoist Marital Status          Judaism        Admission Date Admission Type Admitting Provider Attending Provider Department, Room/Bed    6/9/19 Emergency Goran Byrne MD Fleming, John Eric, MD [address], 447/1    Discharge Date Discharge Disposition Discharge Destination         Home or Self Care              Attending Provider:  Goran Byrne MD    Allergies:  Adhesive Tape, Penicillins    Isolation:  None   Infection:  None   Code Status:  CPR    Ht:  185.4 cm (73\")   Wt:  117 kg (259 lb)    Admission Cmt:  None   Principal Problem:  Chest pain [R07.9]                 Active Insurance as of 6/9/2019     Primary Coverage     Payor Plan Insurance Group Employer/Plan Group    ANTHEM MEDICARE REPLACEMENT ANTH MEDICARE ADVANTAGE [id number]     Payor Plan Address Payor Plan Phone Number Payor Plan Fax Number Effective Dates    [address] [phone]  1/1/2016 - None Entered    [facility] [address]       Subscriber Name Subscriber Birth Date Member ID       AROLDO RODRÍGUEZ 1940 [id number]                 Emergency Contacts      (Rel.) Home Phone Work Phone Mobile Phone    Smitha Rodríguez (Spouse) [phone] -- [phone]        Home Oxygen Therapy [EQ60] (Order [id number])   Order   Date: 6/12/2019 Department: [address] Ordering/Authorizing: Yue Leary APRN   Lab and Collection     Home Oxygen Therapy (Order: [id number]) - 6/12/2019   Order History   Outpatient   Date/Time Action Taken User Additional Information   06/12/19 0723 Pend Yue Leary APRN    06/12/19 0728 Pend Yue Leary APRN    06/12/19 0831 Sign Yue Leary APRN    Order Details     Frequency " Duration Priority Order Class   None None Routine External   Start Date/Time     Start Date   06/12/19   Order Questions     Question Answer Comment   Delivery Modality Nasal Cannula    Liters Per Minute: 2    Duration: With Exertion    Equipment  Oxygen Concentrator &  &  Portable Gaseous Oxygen System & Portable Oxygen Contents Gaseous    Length of Need (99 Months = Lifetime) 99 Months = Lifetime    Note:  Custom values to enter length of need for DME          Source Order Set / Preference List     Order Set    IP GEN EXPRESS DISCHARGE [[id number]]   Clinical Indications      ICD-10-CM ICD-9-CM   Respiratory failure with hypoxia, unspecified chronicity (CMS/[facility])     J96.91 518.81   Reprint Order Requisition     [facility] (Order #[id number]) on 6/12/19   Encounter-Level Cardiology Documents:     There are no encounter-level cardiology documents.   Encounter     View Encounter       Order Provider Info         Office phone Pager E-mail   Ordering User Yue Leary APRN [phone] -- --   Authorizing Provider Yue Leary APRN [phone] -- --   Attending Provider Goran Byrne [phone] -- --   Pended By (on 06/12/2019 0723) Yue Leary APRN [phone] -- --   Pended By (on 06/12/2019 0728) Yue Leary APRN [phone] -- --   Linked Charges     No charges linked to this procedure   Order Transmittal Tracking     [facility]GEN [facility] (Order #[id number]) on 6/12/19   Authorized by:  CLARIBEL Neal  (NPI: [id number])       Lab Component SmartPhrase Guide     OXYGEN THERAPY (Order #[id number]) on 6/12/19            History & Physical      Flako Edwards DO at 6/9/2019 10:22 PM              [facility] Medicine Services  HISTORY AND PHYSICAL    Date of Admission: 6/9/2019  Primary Care Physician: Kevin Latham APRN    Subjective     Chief Complaint: Chest pain    History of Present  "Illness  79-year-old  male who presents emergency department with complaints of chest pain.  He was here on Tuesday, and saw his cardiologist.\"  They doubled my medication\".  He has a follow-up with cardiology on the 14th of this month.  Patient presented today with a history of substernal left chest pain that radiated into his left arm.  The pain was described as a pressure ongoing for 2 hours.  He had no diaphoresis no nausea.  He has this lingering shortness of breath.  He has chronic pitting lower extremity edema.  He has no home oxygen use.  His hemoglobin previously was 12 and it has dropped to 9.2.  Per ER, the patient was negative occult blood.  First set of troponin in the emergency department was negative.        Review of Systems   Chest pain  The patient states that he has nosebleeds, but when investigated he has mild bleeding in his nose when he blows his nose.  The patient states that he has hemoptysis, but under investigation again that he has some streaky sputum occasionally when he coughs  Chronic lower extremity edema  Dyspnea    Otherwise complete ROS reviewed and negative except as mentioned in the HPI.    Past Medical History:   Past Medical History:   Diagnosis Date   • Angina pectoris (CMS/HCC)    • Aortocoronary bypass status    • Atrial fibrillation (CMS/HCC)    • CAD (coronary artery disease), native coronary artery     : CABG and PCI   • Chest pain on exertion    • Chest pain, exertional    • Chronic kidney disease    • Coronary angioplasty status    • Coronary artery disease involving native coronary artery of native heart without angina pectoris 3/10/2017   • Diabetes mellitus (CMS/HCC)    • Essential hypertension    • Hx of CABG 1/11/2018   • Hyperlipidemia    • Hypertension    • Mixed hyperlipidemia    • Paroxysmal atrial fibrillation (CMS/HCC)    • Shortness of breath    • Tobacco use      Past Surgical History:  Past Surgical History:   Procedure Laterality Date   • CARDIAC " CATHETERIZATION     • CARDIAC CATHETERIZATION N/A 1/25/2019    Procedure: Left Heart Cath, coronaries, grafts, possible;  Surgeon: Gabriele Weinstein MD;  Location: [facility] CATH INVASIVE LOC[facility];  Service: Cardiology   • CATARACT EXTRACTION     • CORONARY ARTERY BYPASS GRAFT     • CORONARY STENT PLACEMENT  03/2015     Social History:  reports that he quit smoking about 15 years ago. His smoking use included pipe. He has never used smokeless tobacco. He reports that he drinks alcohol. He reports that he does not use drugs.    Family History: family history includes Alzheimer's disease in his sister; Heart attack in his father, maternal grandfather, maternal grandmother, mother, paternal grandfather, and paternal grandmother; Heart disease in his brother, father, maternal grandfather, maternal grandmother, mother, paternal grandfather, and paternal grandmother.       Allergies:  Allergies   Allergen Reactions   • Adhesive Tape Dermatitis   • Penicillins Rash     Medications:  Prior to Admission medications    Medication Sig Start Date End Date Taking? Authorizing Provider   amiodarone (PACERONE) 200 MG tablet Take 200 mg by mouth Daily.    ProviderSekou MD   aspirin 81 MG chewable tablet Chew 1 tablet Daily. 1/27/19   Praneeth, CLARIBEL Starks   atorvastatin (LIPITOR) 40 MG tablet Take 1 tablet by mouth Every Night. 1/26/19   Ricardo Andrade DO   azelastine (ASTELIN) 0.1 % nasal spray 2 sprays into the nostril(s) as directed by provider 2 (Two) Times a Day As Needed for Rhinitis or Allergies. Use in each nostril as directed    Sekou Hurtado MD   diazepam (VALIUM) 5 MG tablet Take 5 mg by mouth Daily As Needed for Sleep.    Sekou Hurtado MD   fenofibrate (TRICOR) 145 MG tablet Take 145 mg by mouth daily.    Sekou Hurtado MD   furosemide (LASIX) 40 MG tablet Take 40 mg by mouth daily.    Sekou Hurtado MD   Insulin Glargine (BASAGLAR KWIKPEN) 100 UNIT/ML injection pen Inject 80  Units under the skin into the appropriate area as directed Every Night.    Sekou Hurtado MD   insulin regular (NOVOLIN R) 100 UNIT/ML injection Inject  under the skin into the appropriate area as directed Daily. BG - 100 divided by 20.    Sekou Hurtado MD   isosorbide mononitrate (IMDUR) 120 MG 24 hr tablet Take 1 tablet by mouth Daily. 6/4/19   Rocio Andrew APRN   lisinopril (PRINIVIL,ZESTRIL) 5 MG tablet Take 5 mg by mouth Daily.    Sekou Hurtado MD   metoprolol succinate XL (TOPROL-XL) 25 MG 24 hr tablet Take 75 mg by mouth Daily.    Sekou Hurtado MD   Multiple Vitamins-Minerals (MULTIVITAMIN ADULT PO) Take 1 tablet by mouth Daily.    Sekou Hurtado MD   nitroglycerin (NITROSTAT) 0.4 MG SL tablet Place 0.4 mg under the tongue Every 5 (Five) Minutes As Needed for Chest Pain. Take no more than 3 doses in 15 minutes.    Sekou Hurtado MD   Omega-3 Fatty Acids (FISH OIL) 1200 MG capsule delayed-release Take 1,200 mg by mouth 2 (two) times a day.    Sekou Hurtado MD   pantoprazole (PROTONIX) 20 MG EC tablet Take 20 mg by mouth Daily.    Sekou Hurtado MD   potassium chloride (K-DUR,KLOR-CON) 10 MEQ CR tablet Take 10 mEq by mouth daily.    Sekou Hurtado MD   ranolazine (RANEXA) 1000 MG 12 hr tablet Take 1 tablet by mouth 2 (Two) Times a Day. 6/4/19   Rocio Andrew APRN   rivaroxaban (XARELTO) 15 MG tablet Take 15 mg by mouth Daily.    Sekou Hurtado MD   sertraline (ZOLOFT) 25 MG tablet Take 25 mg by mouth daily.    Sekou Hurtado MD   sodium bicarbonate 650 MG tablet Take 650 mg by mouth 2 (Two) Times a Day.    Sekou Hurtado MD   tamsulosin (FLOMAX) 0.4 MG capsule 24 hr capsule Take 1 capsule by mouth daily.    Sekou Hurtado MD   ticagrelor (BRILINTA) 90 MG tablet tablet Take 1 tablet by mouth 2 (Two) Times a Day. 1/26/19   Knees, Deisy E, APRBRANDON   vitamin D (ERGOCALCIFEROL) 11962 UNITS capsule capsule Take 50,000  "Units by mouth 1 (One) Time Per Week. Monday.    ProviderSekou MD   vitamin E 1000 UNIT capsule Take 1,000 Units by mouth daily.    ProviderSekou MD   mometasone (ELOCON) 0.1 % lotion Apply 1 application topically to the appropriate area as directed 2 (Two) Times a Day.  6/9/19  ProviderSekou MD     Objective     Vital Signs: /62 (BP Location: Right arm, Patient Position: Lying)   Pulse 78   Temp 97.7 °F (36.5 °C) (Oral)   Resp 21   Ht 185.4 cm (72.99\")   Wt 118 kg (261 lb 1 oz)   SpO2 99%   BMI 34.45 kg/m²    Physical Exam   HENT:   Head: Normocephalic and atraumatic.   Nose: Nose normal.   Mouth/Throat: Oropharynx is clear and moist.   Eyes: Conjunctivae and EOM are normal.   Neck: Normal range of motion. Neck supple.   Cardiovascular: Normal rate, regular rhythm and normal heart sounds.   Pulmonary/Chest: Effort normal and breath sounds normal.   Abdominal: Soft. Bowel sounds are normal.   Central obesity   Musculoskeletal: He exhibits edema. He exhibits no tenderness.   +2 pitting bilateral lower extremities   Neurological: He is alert. No cranial nerve deficit.   Skin: Skin is warm and dry.   Psychiatric: He has a normal mood and affect.   Vitals reviewed.          Results Reviewed:  Lab Results (last 24 hours)     Procedure Component Value Units Date/Time    BNP [[id number]]  (Normal) Collected:  06/09/19 1950    Specimen:  Blood Updated:  06/09/19 2123     proBNP 984.0 pg/mL     [place] Draw [[id number]] Collected:  06/09/19 1950    Specimen:  Blood Updated:  06/09/19 2101    Narrative:       The following orders were created for panel order [place] Draw.  Procedure                               Abnormality         Status                     ---------                               -----------         ------                     Light Blue Top[[id number]]                                   Final result               Green Top (Gel)[[id number]]                                  " Final result               Lavender Top[[id number]]                                     Final result               Red Top[[id number]]                                          Final result                 Please view results for these tests on the individual orders.    Light Blue Top [[id number]] Collected:  06/09/19 1950    Specimen:  Blood Updated:  06/09/19 2101     Extra Tube hold for add-on     Comment: Auto resulted       Green Top (Gel) [[id number]] Collected:  06/09/19 1950    Specimen:  Blood Updated:  06/09/19 2101     Extra Tube Hold for add-ons.     Comment: Auto resulted.       Lavender Top [[id number]] Collected:  06/09/19 1950    Specimen:  Blood Updated:  06/09/19 2101     Extra Tube hold for add-on     Comment: Auto resulted       Red Top [[id number]] Collected:  06/09/19 1950    Specimen:  Blood Updated:  06/09/19 2101     Extra Tube Hold for add-ons.     Comment: Auto resulted.       POC Occult Blood Stool [[id number]]  (Normal) Collected:  06/09/19 2029    Specimen:  Stool Updated:  06/09/19 2030     Fecal Occult Blood Negative     Lot Number [id number]     Expiration Date 2/29/2020     DEVELOPER LOT NUMBER [id number]     DEVELOPER EXPIRATION DATE 2/29/2020     Positive Control Positive     Negative Control Negative    Troponin [[id number]]  (Normal) Collected:  06/09/19 1950    Specimen:  Blood Updated:  06/09/19 2029     Troponin I <0.012 ng/mL     Comprehensive Metabolic Panel [[id number]]  (Abnormal) Collected:  06/09/19 1950    Specimen:  Blood Updated:  06/09/19 2018     Glucose 137 mg/dL      BUN 30 mg/dL      Creatinine 1.65 mg/dL      Sodium 137 mmol/L      Potassium 4.3 mmol/L      Chloride 103 mmol/L      CO2 24.0 mmol/L      Calcium 9.7 mg/dL      Total Protein 7.7 g/dL      Albumin 4.30 g/dL      ALT (SGPT) 21 U/L      AST (SGOT) 47 U/L      Alkaline Phosphatase 65 U/L      Total Bilirubin 0.5 mg/dL      eGFR Non African Amer 41 mL/min/1.73      Globulin 3.4 gm/dL      A/G Ratio 1.3 g/dL       BUN/Creatinine Ratio 18.2     Anion Gap 10.0 mmol/L     Narrative:       GFR Normal >60  Chronic Kidney Disease <60  Kidney Failure <15    CBC & Differential [[id number]] Collected:  06/09/19 1950    Specimen:  Blood Updated:  06/09/19 2009    Narrative:       The following orders were created for panel order CBC & Differential.  Procedure                               Abnormality         Status                     ---------                               -----------         ------                     CBC Auto Differential[[id number]]        Abnormal            Final result                 Please view results for these tests on the individual orders.    CBC Auto Differential [[id number]]  (Abnormal) Collected:  06/09/19 1950    Specimen:  Blood Updated:  06/09/19 2009     WBC 5.26 10*3/mm3      RBC 2.95 10*6/mm3      Hemoglobin 9.2 g/dL      Hematocrit 28.0 %      MCV 94.9 fL      MCH 31.2 pg      MCHC 32.9 g/dL      RDW 15.2 %      RDW-SD 52.7 fl      MPV 9.7 fL      Platelets 240 10*3/mm3      Neutrophil % 59.6 %      Lymphocyte % 23.2 %      Monocyte % 13.3 %      Eosinophil % 2.5 %      Basophil % 0.8 %      Immature Grans % 0.6 %      Neutrophils, Absolute 3.14 10*3/mm3      Lymphocytes, Absolute 1.22 10*3/mm3      Monocytes, Absolute 0.70 10*3/mm3      Eosinophils, Absolute 0.13 10*3/mm3      Basophils, Absolute 0.04 10*3/mm3      Immature Grans, Absolute 0.03 10*3/mm3      nRBC 0.0 /100 WBC         Imaging Results (last 24 hours)     Procedure Component Value Units Date/Time    XR Chest 1 View [[id number]] Collected:  06/09/19 2005     Updated:  06/09/19 2009    Narrative:       EXAMINATION: XR CHEST 1 VW-     6/9/2019 7:57 PM CDT     HISTORY: Chest Pain Triage Protocol     1 view chest x-ray compared with 01/23/2019.     Low lung volumes with chronic lung changes. Prominence of parenchymal  markings though overall slightly better expansion and clear lungs now as  compared with 5 months ago. There are some patchy  "opacity within the  retrocardiac left lower lobe though this does not appear to be any  different from the previous exam and probably represents chronic change.     Summary:  1. Chronic lung changes with no focal acute lung disease.  This report was finalized on 06/09/2019 20:06 by Dr. Krishan Hoover MD.        I have personally reviewed and interpreted the radiology studies and ECG obtained at time of admission.     Assessment / Plan     Assessment:   Active Hospital Problems    Diagnosis   • Chest pain     Impression:  1.  Chest pain  2.  Dyspnea  3.  History of coronary artery disease  4.  Insulin-dependent diabetes  5.  Anemia  6.  Mild renal insufficiency with a creatinine of 1.35-1.65 today  7.  History of A. fib on chronic anticoagulation  8.  Benign essential hypertension    Plan:   1.  Cardiac markers  2.  Telemetry  3.  Labs in the morning to include lipid, TSH, A1c  4.  Consult cardiology  5.  Serial H&H  6.  Resume home medications  7.  Sliding scale insulin with Accu-Cheks      Code Status: Full     I discussed the patient's findings and my recommendations with the patient and family at bedside    Estimated length of stay 2 to 3 days    Flako Edwards DO   06/09/19   10:22 PM              Electronically signed by Flako Edwards DO at 6/9/2019 10:27 PM            Physician Progress Notes (most recent note)      Roberta Sprague APRN at 6/12/2019  9:49 AM          [facility] -  Progress Note     LOS: 0 days   Patient Care Team:  Kevin Latham APRN as PCP - General (Nurse Practitioner)  Gabriele Weinstein MD as Cardiologist (Cardiology)    Chief Complaint:chest pain follow up    Subjective     Interval History:   Patient did well overnight. He states he is being discharged today. He states the \"fullness\" in his chest is better. Continues complaint of CHAPARRO. Vitals remained stable overnight-AF rate controlled. Labs are stable today.    Review of Systems:   Review of " Systems   Constitution: Negative for weakness, malaise/fatigue, weight gain and weight loss.   Cardiovascular: Positive for dyspnea on exertion. Negative for chest pain, irregular heartbeat, leg swelling, near-syncope, orthopnea, palpitations, paroxysmal nocturnal dyspnea and syncope.   Respiratory: Negative for cough, shortness of breath, sleep disturbances due to breathing, sputum production and wheezing.    Skin: Negative for dry skin, flushing, itching and rash.   Gastrointestinal: Negative for hematemesis and hematochezia.   Neurological: Negative for dizziness, light-headedness and loss of balance.        Objective     Vital Sign Min/Max for last 24 hours  Temp  Min: 97.5 °F (36.4 °C)  Max: 98.4 °F (36.9 °C)   BP  Min: 110/62  Max: 140/56   Pulse  Min: 63  Max: 80   Resp  Min: 16  Max: 18   SpO2  Min: 92 %  Max: 99 %   No Data Recorded   Weight  Min: 117 kg (259 lb)  Max: 117 kg (259 lb)     Telemetry: afib 70-80         06/11/19  1931   Weight: 117 kg (259 lb)         Intake/Output Summary (Last 24 hours) at 6/12/2019 0949  Last data filed at 6/11/2019 1800  Gross per 24 hour   Intake 480 ml   Output 1200 ml   Net -720 ml         Physical Exam:  Physical Exam   Constitutional: He is oriented to person, place, and time. He appears well-developed and well-nourished. No distress.   HENT:   Head: Normocephalic.   Mouth/Throat: No oropharyngeal exudate.   Eyes: Conjunctivae and EOM are normal. Pupils are equal, round, and reactive to light. No scleral icterus.   Neck: Normal range of motion. Neck supple.   Cardiovascular: Normal rate, normal heart sounds and intact distal pulses. An irregular rhythm present.   No murmur heard.  Pulmonary/Chest: Effort normal and breath sounds normal. No respiratory distress. He has no wheezes. He has no rales. He exhibits no tenderness.   Abdominal: Soft. Bowel sounds are normal. He exhibits no distension. There is no tenderness.   Musculoskeletal: Normal range of motion. He  exhibits no edema.   Neurological: He is alert and oriented to person, place, and time. He has normal reflexes.   Skin: Skin is warm and dry. No rash noted. He is not diaphoretic. No erythema. No pallor.   Psychiatric: He has a normal mood and affect. His behavior is normal.   Vitals reviewed.       Results Review:   Lab Results (last 72 hours)     Procedure Component Value Units Date/Time    Basic Metabolic Panel [[id number]]  (Abnormal) Collected:  06/12/19 0528    Specimen:  Blood Updated:  06/12/19 0620     Glucose 117 mg/dL      BUN 26 mg/dL      Creatinine 1.49 mg/dL      Sodium 142 mmol/L      Potassium 4.3 mmol/L      Chloride 106 mmol/L      CO2 25.0 mmol/L      Calcium 9.9 mg/dL      eGFR Non African Amer 46 mL/min/1.73      BUN/Creatinine Ratio 17.4     Anion Gap 11.0 mmol/L     Narrative:       GFR Normal >60  Chronic Kidney Disease <60  Kidney Failure <15    CBC & Differential [[id number]] Collected:  06/12/19 0528    Specimen:  Blood Updated:  06/12/19 0610    Narrative:       The following orders were created for panel order CBC & Differential.  Procedure                               Abnormality         Status                     ---------                               -----------         ------                     CBC Auto Differential[[id number]]        Abnormal            Final result                 Please view results for these tests on the individual orders.    CBC Auto Differential [[id number]]  (Abnormal) Collected:  06/12/19 0528    Specimen:  Blood Updated:  06/12/19 0610     WBC 8.44 10*3/mm3      RBC 2.93 10*6/mm3      Hemoglobin 9.1 g/dL      Hematocrit 28.4 %      MCV 96.9 fL      MCH 31.1 pg      MCHC 32.0 g/dL      RDW 15.5 %      RDW-SD 55.0 fl      MPV 9.5 fL      Platelets 253 10*3/mm3      Neutrophil % 76.0 %      Lymphocyte % 10.7 %      Monocyte % 12.7 %      Eosinophil % 0.1 %      Basophil % 0.0 %      Immature Grans % 0.5 %      Neutrophils, Absolute 6.42 10*3/mm3       Lymphocytes, Absolute 0.90 10*3/mm3      Monocytes, Absolute 1.07 10*3/mm3      Eosinophils, Absolute 0.01 10*3/mm3      Basophils, Absolute 0.00 10*3/mm3      Immature Grans, Absolute 0.04 10*3/mm3      nRBC 0.0 /100 WBC     POC Glucose Once [[id number]]  (Abnormal) Collected:  06/11/19 1934    Specimen:  Blood Updated:  06/11/19 1946     Glucose 199 mg/dL      Comment: : [id number] Shannan CraigMeter ID: [id number]       POC Glucose Once [[id number]]  (Abnormal) Collected:  06/11/19 1626    Specimen:  Blood Updated:  06/11/19 1656     Glucose 153 mg/dL      Comment: : [id number] Rhett TaylorndaMeter ID: [id number]       Occult Blood X 1, Stool - Stool, Per Rectum [[id number]]  (Abnormal) Collected:  06/11/19 1432    Specimen:  Stool from Per Rectum Updated:  06/11/19 1613     Fecal Occult Blood Positive    POC Glucose Once [[id number]]  (Normal) Collected:  06/11/19 1132    Specimen:  Blood Updated:  06/11/19 1325     Glucose 126 mg/dL      Comment: : [id number] Rhett Sofa LabsndaMeter ID: [id number]       Iron Profile [[id number]]  (Abnormal) Collected:  06/10/19 0545    Specimen:  Blood Updated:  06/11/19 1222     Iron 45 mcg/dL      TIBC 446 mcg/dL      Iron Saturation 10 %     Blood Gas, Arterial [[id number]]  (Abnormal) Collected:  06/11/19 1155    Specimen:  Arterial Blood Updated:  06/11/19 1156     Site Left Radial     Valentin's Test Positive     pH, Arterial 7.447 pH units      pCO2, Arterial 36.2 mm Hg      pO2, Arterial 64.3 mm Hg      Comment: 84 Value below reference range        HCO3, Arterial 24.9 mmol/L      Base Excess, Arterial 1.0 mmol/L      O2 Saturation, Arterial 91.4 %      Comment: 84 Value below reference range        Temperature 37.0 C      Barometric Pressure for Blood Gas 755 mmHg      Modality Room Air     FIO2 21 %      Ventilator Mode NA     Collected by [id number]     Comment: Meter: [id number]     :  [id number]       POC Glucose Once [[id number]]  (Normal) Collected:  06/11/19  0818    Specimen:  Blood Updated:  06/11/19 0852     Glucose 129 mg/dL      Comment: : [id number] FernandezaMeter ID: [id number]       Basic Metabolic Panel [[id number]]  (Abnormal) Collected:  06/11/19 0456    Specimen:  Blood Updated:  06/11/19 0620     Glucose 91 mg/dL      BUN 28 mg/dL      Creatinine 1.76 mg/dL      Sodium 140 mmol/L      Potassium 4.3 mmol/L      Chloride 102 mmol/L      CO2 26.0 mmol/L      Calcium 9.7 mg/dL      eGFR Non African Amer 38 mL/min/1.73      BUN/Creatinine Ratio 15.9     Anion Gap 12.0 mmol/L     Narrative:       GFR Normal >60  Chronic Kidney Disease <60  Kidney Failure <15    CBC & Differential [[id number]] Collected:  06/11/19 0456    Specimen:  Blood Updated:  06/11/19 0609    Narrative:       The following orders were created for panel order CBC & Differential.  Procedure                               Abnormality         Status                     ---------                               -----------         ------                     CBC Auto Differential[[id number]]        Abnormal            Final result                 Please view results for these tests on the individual orders.    CBC Auto Differential [[id number]]  (Abnormal) Collected:  06/11/19 0456    Specimen:  Blood Updated:  06/11/19 0609     WBC 5.40 10*3/mm3      RBC 3.02 10*6/mm3      Hemoglobin 9.6 g/dL      Hematocrit 30.1 %      MCV 99.7 fL      MCH 31.8 pg      MCHC 31.9 g/dL      RDW 15.5 %      RDW-SD 55.5 fl      MPV 10.0 fL      Platelets 255 10*3/mm3      Neutrophil % 58.1 %      Lymphocyte % 24.4 %      Monocyte % 14.1 %      Eosinophil % 2.8 %      Basophil % 0.4 %      Immature Grans % 0.2 %      Neutrophils, Absolute 3.14 10*3/mm3      Lymphocytes, Absolute 1.32 10*3/mm3      Monocytes, Absolute 0.76 10*3/mm3      Eosinophils, Absolute 0.15 10*3/mm3      Basophils, Absolute 0.02 10*3/mm3      Immature Grans, Absolute 0.01 10*3/mm3      nRBC 0.0 /100 WBC     T3, Free [[id number]] Collected:   06/09/19 1950    Specimen:  Blood Updated:  06/11/19 0510     T3, Free 2.2 pg/mL     Narrative:       Performed at:  [address] [place] [address]  [id number]  : Min Luu PhD, Phone:  [phone]    Hemoglobin & Hematocrit, Blood [[id number]]  (Abnormal) Collected:  06/10/19 2255    Specimen:  Blood Updated:  06/10/19 2300     Hemoglobin 9.5 g/dL      Hematocrit 28.9 %     POC Glucose Once [[id number]]  (Normal) Collected:  06/10/19 2041    Specimen:  Blood Updated:  06/10/19 2107     Glucose 117 mg/dL      Comment: : [id number] [place] ([facility]) Conchis Cedeño ID: [id number]       POC Glucose Once [[id number]]  (Normal) Collected:  06/10/19 1520    Specimen:  Blood Updated:  06/10/19 1550     Glucose 91 mg/dL      Comment: : [id number] Rhett Camacho ID: [id number]       Hemoglobin & Hematocrit, Blood [[id number]]  (Abnormal) Collected:  06/10/19 1531    Specimen:  Blood Updated:  06/10/19 1541     Hemoglobin 9.5 g/dL      Hematocrit 29.3 %     POC Glucose Once [[id number]]  (Normal) Collected:  06/10/19 1125    Specimen:  Blood Updated:  06/10/19 1227     Glucose 96 mg/dL      Comment: : [id number] Rhett Camacho ID: [id number]       Vitamin B12 [[id number]]  (Normal) Collected:  06/10/19 0545    Specimen:  Blood Updated:  06/10/19 1222     Vitamin B-12 352 pg/mL     Folate [[id number]]  (Normal) Collected:  06/10/19 0545    Specimen:  Blood Updated:  06/10/19 1222     Folate 12.60 ng/mL     Ferritin [[id number]]  (Normal) Collected:  06/10/19 0545    Specimen:  Blood Updated:  06/10/19 1151     Ferritin 29.50 ng/mL     T4, Free [[id number]]  (Normal) Collected:  06/10/19 0545    Specimen:  Blood Updated:  06/10/19 1133     Free T4 1.00 ng/dL     POC Glucose Once [[id number]]  (Normal) Collected:  06/10/19 0802    Specimen:  Blood Updated:  06/10/19 0831     Glucose 93 mg/dL      Comment: : [id number] Rhett Camacho ID: [id number]       Hemoglobin & Hematocrit,  Blood [[id number]]  (Abnormal) Collected:  06/10/19 0757    Specimen:  Blood Updated:  06/10/19 0819     Hemoglobin 9.1 g/dL      Hematocrit 28.2 %     Hemoglobin A1c [[id number]] Collected:  06/10/19 0545    Specimen:  Blood Updated:  06/10/19 0741     Hemoglobin A1C 6.3 %     Narrative:       Less than 6.0           Non-Diabetic Range  6.0-7.0                 ADA Therapeutic Target  Greater than 7.0        Action Suggested    TSH [[id number]]  (Abnormal) Collected:  06/10/19 0545    Specimen:  Blood Updated:  06/10/19 0730     TSH 10.800 mIU/mL     Lipid Panel [[id number]]  (Abnormal) Collected:  06/10/19 0545    Specimen:  Blood Updated:  06/10/19 0711     Total Cholesterol 120 mg/dL      Triglycerides 135 mg/dL      HDL Cholesterol 36 mg/dL      LDL Cholesterol  61 mg/dL      LDL/HDL Ratio 1.58    Troponin [[id number]]  (Normal) Collected:  06/10/19 0545    Specimen:  Blood Updated:  06/10/19 0711     Troponin I <0.012 ng/mL     Comprehensive Metabolic Panel [[id number]]  (Abnormal) Collected:  06/10/19 0545    Specimen:  Blood Updated:  06/10/19 0710     Glucose 90 mg/dL      BUN 25 mg/dL      Creatinine 1.66 mg/dL      Sodium 138 mmol/L      Potassium 4.2 mmol/L      Chloride 104 mmol/L      CO2 24.0 mmol/L      Calcium 9.6 mg/dL      Total Protein 6.9 g/dL      Albumin 3.90 g/dL      ALT (SGPT) 17 U/L      AST (SGOT) 18 U/L      Alkaline Phosphatase 57 U/L      Total Bilirubin 0.5 mg/dL      eGFR Non African Amer 40 mL/min/1.73      Globulin 3.0 gm/dL      A/G Ratio 1.3 g/dL      BUN/Creatinine Ratio 15.1     Anion Gap 10.0 mmol/L     Narrative:       GFR Normal >60  Chronic Kidney Disease <60  Kidney Failure <15    Phosphorus [[id number]]  (Normal) Collected:  06/10/19 0545    Specimen:  Blood Updated:  06/10/19 0659     Phosphorus 4.1 mg/dL     CBC Auto Differential [[id number]]  (Abnormal) Collected:  06/10/19 0545    Specimen:  Blood Updated:  06/10/19 0637     WBC 5.34 10*3/mm3      RBC 2.94 10*6/mm3       Hemoglobin 9.2 g/dL      Hematocrit 28.7 %      MCV 97.6 fL      MCH 31.3 pg      MCHC 32.1 g/dL      RDW 15.2 %      RDW-SD 53.7 fl      MPV 9.8 fL      Platelets 220 10*3/mm3      Neutrophil % 65.7 %      Lymphocyte % 18.7 %      Monocyte % 12.2 %      Eosinophil % 2.6 %      Basophil % 0.4 %      Immature Grans % 0.4 %      Neutrophils, Absolute 3.51 10*3/mm3      Lymphocytes, Absolute 1.00 10*3/mm3      Monocytes, Absolute 0.65 10*3/mm3      Eosinophils, Absolute 0.14 10*3/mm3      Basophils, Absolute 0.02 10*3/mm3      Immature Grans, Absolute 0.02 10*3/mm3      nRBC 0.0 /100 WBC     Troponin [[id number]]  (Normal) Collected:  06/09/19 2228    Specimen:  Blood Updated:  06/09/19 2305     Troponin I <0.012 ng/mL     Hemoglobin & Hematocrit, Blood [[id number]]  (Abnormal) Collected:  06/09/19 2228    Specimen:  Blood Updated:  06/09/19 2238     Hemoglobin 8.9 g/dL      Hematocrit 27.6 %     BNP [[id number]]  (Normal) Collected:  06/09/19 1950    Specimen:  Blood Updated:  06/09/19 2123     proBNP 984.0 pg/mL     [place] Draw [[id number]] Collected:  06/09/19 1950    Specimen:  Blood Updated:  06/09/19 2101    Narrative:       The following orders were created for panel order [place] Draw.  Procedure                               Abnormality         Status                     ---------                               -----------         ------                     Light Blue Top[[id number]]                                   Final result               Green Top (Gel)[[id number]]                                  Final result               Lavender Top[[id number]]                                     Final result               Red Top[[id number]]                                          Final result                 Please view results for these tests on the individual orders.    Light Blue Top [[id number]] Collected:  06/09/19 1950    Specimen:  Blood Updated:  06/09/19 2101     Extra Tube hold for add-on     Comment: Auto  resulted       Green Top (Gel) [[id number]] Collected:  06/09/19 1950    Specimen:  Blood Updated:  06/09/19 2101     Extra Tube Hold for add-ons.     Comment: Auto resulted.       Lavender Top [[id number]] Collected:  06/09/19 1950    Specimen:  Blood Updated:  06/09/19 2101     Extra Tube hold for add-on     Comment: Auto resulted       Red Top [[id number]] Collected:  06/09/19 1950    Specimen:  Blood Updated:  06/09/19 2101     Extra Tube Hold for add-ons.     Comment: Auto resulted.       POC Occult Blood Stool [[id number]]  (Normal) Collected:  06/09/19 2029    Specimen:  Stool Updated:  06/09/19 2030     Fecal Occult Blood Negative     Lot Number [id number]     Expiration Date 2/29/2020     DEVELOPER LOT NUMBER [id number]     DEVELOPER EXPIRATION DATE 2/29/2020     Positive Control Positive     Negative Control Negative    Troponin [[id number]]  (Normal) Collected:  06/09/19 1950    Specimen:  Blood Updated:  06/09/19 2029     Troponin I <0.012 ng/mL     Comprehensive Metabolic Panel [[id number]]  (Abnormal) Collected:  06/09/19 1950    Specimen:  Blood Updated:  06/09/19 2018     Glucose 137 mg/dL      BUN 30 mg/dL      Creatinine 1.65 mg/dL      Sodium 137 mmol/L      Potassium 4.3 mmol/L      Chloride 103 mmol/L      CO2 24.0 mmol/L      Calcium 9.7 mg/dL      Total Protein 7.7 g/dL      Albumin 4.30 g/dL      ALT (SGPT) 21 U/L      AST (SGOT) 47 U/L      Alkaline Phosphatase 65 U/L      Total Bilirubin 0.5 mg/dL      eGFR Non African Amer 41 mL/min/1.73      Globulin 3.4 gm/dL      A/G Ratio 1.3 g/dL      BUN/Creatinine Ratio 18.2     Anion Gap 10.0 mmol/L     Narrative:       GFR Normal >60  Chronic Kidney Disease <60  Kidney Failure <15    CBC & Differential [[id number]] Collected:  06/09/19 1950    Specimen:  Blood Updated:  06/09/19 2009    Narrative:       The following orders were created for panel order CBC & Differential.  Procedure                               Abnormality         Status                     ---------                                -----------         ------                     CBC Auto Differential[[id number]]        Abnormal            Final result                 Please view results for these tests on the individual orders.    CBC Auto Differential [[id number]]  (Abnormal) Collected:  06/09/19 1950    Specimen:  Blood Updated:  06/09/19 2009     WBC 5.26 10*3/mm3      RBC 2.95 10*6/mm3      Hemoglobin 9.2 g/dL      Hematocrit 28.0 %      MCV 94.9 fL      MCH 31.2 pg      MCHC 32.9 g/dL      RDW 15.2 %      RDW-SD 52.7 fl      MPV 9.7 fL      Platelets 240 10*3/mm3      Neutrophil % 59.6 %      Lymphocyte % 23.2 %      Monocyte % 13.3 %      Eosinophil % 2.5 %      Basophil % 0.8 %      Immature Grans % 0.6 %      Neutrophils, Absolute 3.14 10*3/mm3      Lymphocytes, Absolute 1.22 10*3/mm3      Monocytes, Absolute 0.70 10*3/mm3      Eosinophils, Absolute 0.13 10*3/mm3      Basophils, Absolute 0.04 10*3/mm3      Immature Grans, Absolute 0.03 10*3/mm3      nRBC 0.0 /100 WBC               Echo EF Estimated  No results found for: ECHOEFEST      Cath Ejection Fraction Quantitative  No results found for: CATHEF        Medication Review: yes  Current Facility-Administered Medications   Medication Dose Route Frequency Provider Last Rate Last Dose   • atorvastatin (LIPITOR) tablet 40 mg  40 mg Oral Nightly Flako Edwards DO   40 mg at 06/11/19 2023   • dextrose (GLUTOSE) oral gel 15 g  15 g Oral Q15 Min PRN Flako Edwards DO       • diazePAM (VALIUM) tablet 5 mg  5 mg Oral Nightly PRN Flako Edwards DO       • doxycycline (ADOXA) tablet 100 mg  100 mg Oral Q12H Yue Leary APRN   100 mg at 06/12/19 0828   • fenofibrate (TRICOR) tablet 145 mg  145 mg Oral Daily Flako Edwards DO   145 mg at 06/12/19 0828   • insulin detemir (LEVEMIR) injection 80 Units  80 Units Subcutaneous Nightly Flako Edwards DO   80 Units at 06/11/19 2022   • insulin lispro (humaLOG) injection 2-7 Units  2-7 Units  Subcutaneous 4x Daily With Meals & Nightly Flako Edwards DO   2 Units at 06/11/19 1719   • ipratropium (ATROVENT) nebulizer solution 0.5 mg  0.5 mg Nebulization 4x Daily - RT Yue Leary APRN   0.5 mg at 06/12/19 0810   • isosorbide mononitrate (IMDUR) 24 hr tablet 120 mg  120 mg Oral Daily Flako Edwards DO   120 mg at 06/12/19 0828   • lisinopril (PRINIVIL,ZESTRIL) tablet 5 mg  5 mg Oral Daily Flako Edwards DO   5 mg at 06/12/19 0829   • metoprolol succinate XL (TOPROL-XL) 24 hr tablet 75 mg  75 mg Oral Daily Flako Edwards DO   75 mg at 06/12/19 0828   • OCUVITE-LUTEIN (OCUVITE) tablet 1 tablet  1 tablet Oral Daily Flako Edwards DO   1 tablet at 06/12/19 0828   • ondansetron (ZOFRAN) injection 4 mg  4 mg Intravenous Q6H PRN Flako Edwards DO       • pantoprazole (PROTONIX) EC tablet 40 mg  40 mg Oral Daily Yue Leary APRN   40 mg at 06/12/19 0828   • potassium chloride (MICRO-K) CR capsule 10 mEq  10 mEq Oral Daily Flako Edwards DO   10 mEq at 06/12/19 0828   • ranolazine (RANEXA) 12 hr tablet 1,000 mg  1,000 mg Oral BID Flako Edwards DO   1,000 mg at 06/12/19 0828   • rivaroxaban (XARELTO) tablet 20 mg  20 mg Oral Daily With Dinner Alena Humphries APRN   20 mg at 06/11/19 1721   • sertraline (ZOLOFT) tablet 25 mg  25 mg Oral Daily Flako Edwards DO   25 mg at 06/12/19 0828   • sodium bicarbonate tablet 650 mg  650 mg Oral BID Flako Edwards DO   650 mg at 06/12/19 0828   • sodium chloride 0.9 % flush 10 mL  10 mL Intravenous PRN Alexander Rodriguez MD       • sodium chloride 0.9 % flush 3 mL  3 mL Intravenous Q12H Flako Edwards DO   3 mL at 06/12/19 0827   • sodium chloride 0.9 % flush 3-10 mL  3-10 mL Intravenous PRN Flako Edwards DO       • tamsulosin (FLOMAX) 24 hr capsule 0.4 mg  0.4 mg Oral Daily Flako Edwards DO   0.4 mg at 06/12/19 0829   • ticagrelor (BRILINTA) tablet 90 mg  90 mg Oral BID Flako Edwards  DO Kelly   90 mg at 06/12/19 0829   • vitamin E capsule 1,000 Units  1,000 Units Oral Daily Flako Edwards DO   1,000 Units at 06/12/19 0828         Assessment/Plan       Chest pain    Stage 3 chronic kidney disease (CMS/HCC)    Mixed hyperlipidemia    Type 2 diabetes mellitus with chronic kidney disease, with long-term current use of insulin (CMS/HCC)    Essential hypertension    Chronic atrial fibrillation (CMS/HCC)    Chronic anticoagulation    Anemia    Elevated TSH    Acute kidney injury (CMS/HCC)    Respiratory failure with hypoxia (CMS/[facility])      Plan:  Ok with cardiology to d/c home.   Echo showed EF of 61-65% with mild mitral and tricuspid valve regurgitation. No need for heart cath at this time.  Walking oximetry completed. PFTs scheduled for today. Will defer to primary.    Continue Xarelto 20mg.   D/c ASA and Amiodarone.   Continue cardiac meds including Toprolol, lisinopril, Imdur, Ranexa, lipitor, and lasix.  Keep f/u with NP on 6/19.     Further recommendations per Dr. Weinstein.    CLARIBEL Conti  06/12/19  9:49 AM                    Electronically signed by Roberta Sprague APRN at 6/12/2019  9:58 AM          Consult Notes (most recent note)      Gabriele Weinstein MD at 6/10/2019  9:01 AM      Consult Orders    1. Inpatient Cardiology Consult [[id number]] ordered by Flako Edwards DO at 06/09/19 2222                Inpatient Cardiology Consult  Consult performed by: Gabriele Weinstein MD  Consult ordered by: Flako Edwards DO  Reason for consult: Dr. Weinstein's patient      Chief Complaint: Shortness of breath    HPI: This is a 79 year old male with CAD, s/p CABG and, most recently a PCI to SVG to OM in 1/2019, chronic diastolic dysfunction, PAF/flutter, hypertension, hyperlipidemia, diabetes mellitus, stage 3 CKD, obesity, here with complaints of progressive shortness of breath and, yesterday, left arm pain.  The patient notes that over the past few weeks, he has  noticed progressive SOB and CHAPARRO, now to the point of SOB with minimal exertion.  Also, he notes intermittent chest discomfort, described to me as a fullness (not true pain), moderate in intensity, associated with his shortness of breath, no radiation.In addition, he notes generalized weakness and fatigue.  No fevers, chills, cough.  No change in weight.  No orthopnea, PND, or increase in what he describes as baseline edema.  The patient was seen in our office last week and medications were adjusted at that time, including Imdur and Ranexa - these changes did not help with his symptoms.  The patient denies palpitations, lightheadedness, dizziness, syncope.  Yesterday, he notes acute onset of left arm discomfort - no significant chest discomfort at that time. This has resolved at this time.      He presented to the ER.  No acute ST changes and cardiac markers have been negative.  CXR with chronic changes but no focal infiltrate.  BNP essentially normal.  Hgb down from last check in 1/2019 - patient denies any obvious blood loss of any significance. In addition, he does have stage 3 CKD, and creatinine increased slightly from 1.35 in 1/2019 to 1.65 currently.    Past Medical History:   Diagnosis Date   • Angina pectoris (CMS/[facility])    • Chronic kidney disease    • Coronary artery disease involving native coronary artery of native heart without angina pectoris 3/10/2017   • Diabetes mellitus (CMS/HCC)    • Essential hypertension    • Hx of CABG 1/11/2018   • Mixed hyperlipidemia    • Paroxysmal atrial fibrillation (CMS/HCC)    • Shortness of breath    • Tobacco use      Past Surgical History:   Procedure Laterality Date   • CARDIAC CATHETERIZATION     • CARDIAC CATHETERIZATION N/A 1/25/2019    Procedure: Left Heart Cath, coronaries, grafts, possible;  Surgeon: Gabriele Weinstein MD;  Location: [facility] CATH INVASIVE LOCATION;  Service: Cardiology   • CATARACT EXTRACTION     • CORONARY ARTERY BYPASS GRAFT     • CORONARY  STENT PLACEMENT  03/2015       Current Facility-Administered Medications:   •  amiodarone (PACERONE) tablet 200 mg, 200 mg, Oral, Daily, Flako Edwards DO  •  aspirin chewable tablet 81 mg, 81 mg, Oral, Daily, Flako Edwards DO  •  atorvastatin (LIPITOR) tablet 40 mg, 40 mg, Oral, Nightly, Flako Edwards DO, 40 mg at 06/09/19 2351  •  dextrose (GLUTOSE) oral gel 15 g, 15 g, Oral, Q15 Min PRN, Flako Edwards DO  •  diazePAM (VALIUM) tablet 5 mg, 5 mg, Oral, Nightly PRN, Flako Edwards DO  •  fenofibrate (TRICOR) tablet 145 mg, 145 mg, Oral, Daily, Flako Edwards DO  •  furosemide (LASIX) tablet 40 mg, 40 mg, Oral, Daily, Flako Edwards,   •  insulin detemir (LEVEMIR) injection 80 Units, 80 Units, Subcutaneous, Nightly, Flako Edwards DO, 80 Units at 06/10/19 0011  •  insulin lispro (humaLOG) injection 2-7 Units, 2-7 Units, Subcutaneous, 4x Daily With Meals & Nightly, Flako Edwards DO  •  isosorbide mononitrate (IMDUR) 24 hr tablet 120 mg, 120 mg, Oral, Daily, Flako Edwards DO  •  lisinopril (PRINIVIL,ZESTRIL) tablet 5 mg, 5 mg, Oral, Daily, Flako Edwards DO  •  metoprolol succinate XL (TOPROL-XL) 24 hr tablet 75 mg, 75 mg, Oral, Daily, Flako Edwadrs DO  •  OCUVITE-LUTEIN (OCUVITE) tablet 1 tablet, 1 tablet, Oral, Daily, Flako Edwards DO  •  ondansetron (ZOFRAN) injection 4 mg, 4 mg, Intravenous, Q6H PRN, Flako Edwards,   •  pantoprazole (PROTONIX) EC tablet 20 mg, 20 mg, Oral, Daily, Flako Edwards,   •  potassium chloride (MICRO-K) CR capsule 10 mEq, 10 mEq, Oral, Daily, Flako Edwards DO  •  ranolazine (RANEXA) 12 hr tablet 1,000 mg, 1,000 mg, Oral, BID, Flako Edwards DO, 1,000 mg at 06/09/19 2351  •  rivaroxaban (XARELTO) tablet 15 mg, 15 mg, Oral, Daily With Dinner, Flako Edwards DO, 15 mg at 06/09/19 2351  •  sertraline (ZOLOFT) tablet 25 mg, 25 mg, Oral, Daily, Flako Edwards DO  •  sodium bicarbonate  "tablet 650 mg, 650 mg, Oral, BID, Flako Edwards DO, 650 mg at 06/09/19 2351  •  sodium chloride 0.9 % flush 10 mL, 10 mL, Intravenous, PRN, Alexander Rodriguez MD  •  sodium chloride 0.9 % flush 3 mL, 3 mL, Intravenous, Q12H, Flako Edwards DO, 3 mL at 06/09/19 2351  •  sodium chloride 0.9 % flush 3-10 mL, 3-10 mL, Intravenous, PRN, Flako Edwards DO  •  tamsulosin (FLOMAX) 24 hr capsule 0.4 mg, 0.4 mg, Oral, Daily, Flako Edwards DO  •  ticagrelor (BRILINTA) tablet 90 mg, 90 mg, Oral, BID, Flako Edwards DO, 90 mg at 06/09/19 2351  •  vitamin E capsule 1,000 Units, 1,000 Units, Oral, Daily, Flako Edwards DO    Allergies   Allergen Reactions   • Adhesive Tape Dermatitis   • Penicillins Rash     Social History     Tobacco Use   • Smoking status: Former Smoker     Types: Pipe     Last attempt to quit: 2004     Years since quitting: 15.4   • Smokeless tobacco: Never Used   • Tobacco comment: QUIT AT AGE 63   Substance Use Topics   • Alcohol use: Yes     Comment: Socially     Family History   Problem Relation Age of Onset   • Heart attack Mother    • Heart disease Mother    • Heart attack Father    • Heart disease Father    • Heart disease Brother    • Heart disease Maternal Grandmother    • Heart attack Maternal Grandmother    • Heart disease Maternal Grandfather    • Heart attack Maternal Grandfather    • Heart disease Paternal Grandmother    • Heart attack Paternal Grandmother    • Heart disease Paternal Grandfather    • Heart attack Paternal Grandfather    • Alzheimer's disease Sister      Physical Exam:    /72 (BP Location: Left arm, Patient Position: Lying)   Pulse 67   Temp 98.2 °F (36.8 °C) (Oral)   Resp 21   Ht 185.4 cm (72.99\")   Wt 118 kg (261 lb 1 oz)   SpO2 98%   BMI 34.45 kg/m²    Temp:  [97.4 °F (36.3 °C)-98.2 °F (36.8 °C)] 98.2 °F (36.8 °C)  Heart Rate:  [67-81] 67  Resp:  [11-23] 21  BP: ()/(46-88) 121/72    Physical Exam   Constitutional: He is " oriented to person, place, and time. Vital signs are normal. He appears well-developed and well-nourished. He is cooperative.  Non-toxic appearance. No distress.   HENT:   Head: Normocephalic and atraumatic.   Right Ear: External ear normal.   Left Ear: External ear normal.   Nose: Nose normal.   Mouth/Throat: Uvula is midline, oropharynx is clear and moist and mucous membranes are normal. Mucous membranes are not pale, not dry and not cyanotic. No oropharyngeal exudate.   Eyes: EOM and lids are normal. Pupils are equal, round, and reactive to light.   Neck: Normal range of motion. Neck supple. No hepatojugular reflux and no JVD present. Carotid bruit is not present. No tracheal deviation and no edema present. No thyroid mass and no thyromegaly present.   Cardiovascular: Normal rate, S1 normal, S2 normal, normal heart sounds, intact distal pulses and normal pulses. An irregularly irregular rhythm present.  No extrasystoles are present. PMI is not displaced. Exam reveals no gallop and no friction rub.   No murmur heard.  Pulses:       Radial pulses are 2+ on the right side, and 2+ on the left side.        Femoral pulses are 2+ on the right side, and 2+ on the left side.       Dorsalis pedis pulses are 2+ on the right side, and 2+ on the left side.        Posterior tibial pulses are 2+ on the right side, and 2+ on the left side.   Pulmonary/Chest: Effort normal. No accessory muscle usage. Tachypnea noted. No respiratory distress. He has no wheezes. He has rhonchi. He has no rales. He exhibits no tenderness.   Abdominal: Soft. Normal appearance and bowel sounds are normal. He exhibits no distension, no abdominal bruit and no pulsatile midline mass. There is no hepatosplenomegaly. There is no tenderness.   Musculoskeletal: Normal range of motion. He exhibits edema (trace bilateral). He exhibits no tenderness or deformity.   Lymphadenopathy:     He has no cervical adenopathy.   Neurological: He is oriented to person,  place, and time. He has normal strength. No cranial nerve deficit.   Skin: Skin is warm, dry and intact. No rash noted. He is not diaphoretic. No cyanosis or erythema. Nails show no clubbing.   Psychiatric: He has a normal mood and affect. His speech is normal and behavior is normal. Thought content normal.   Vitals reviewed.    Diagnostic Data:    Lab Results   Component Value Date    WBC 5.34 06/10/2019    HGB 9.1 (L) 06/10/2019    HCT 28.2 (L) 06/10/2019    MCV 97.6 (H) 06/10/2019     06/10/2019     **Hgb 1/2019 ~ 12-13    Lab Results   Component Value Date    GLUCOSE 90 06/10/2019    CALCIUM 9.6 06/10/2019     06/10/2019    K 4.2 06/10/2019    CO2 24.0 06/10/2019     06/10/2019    BUN 25 (H) 06/10/2019    CREATININE 1.66 (H) 06/10/2019    EGFRIFNONA 40 (L) 06/10/2019    BCR 15.1 06/10/2019    ANIONGAP 10.0 06/10/2019      (previously 1090 on 1/23/2019)    Troponin negative on consecutive checks    ECG: afib with controlled HR, NSTT (no change from 1/2019)    CXR:  Low lung volumes with chronic lung changes. Prominence of parenchymal  markings though overall slightly better expansion and clear lungs now as  compared with 5 months ago. There are some patchy opacity within the  retrocardiac left lower lobe though this does not appear to be any  different from the previous exam and probably represents chronic change.    Echo 1/24/2019:  · Left ventricular systolic function is normal. Estimated EF appears to be in the range of 56 - 60%.  · Left ventricular wall thickness is consistent with mild concentric hypertrophy.  · No obvious valvular abnormalities.    ASSESSMENT/PLAN:    1. Shortness of breath  2. Atypical chest pain  3. CAD in native heart, native artery and bypass graft  4. Stage 3 CKD  5. Anemia  6. Essential hypertension  7. Mixed hyperlipidemia  8. Type II diabetes mellitus, insulin requiring with nephropathy  9. Chronic diastolic heart failure  10. Paroxysmal atrial  fibrillation/flutter  11. Chronic anticoagulation status    - Patient admitted with increasing shortness of breath and atypical chest pain.  He has ruled out for MI so far with serial cardiac enzymes.  His BNP was essentially normal.  CXR with essentially clear lungs.  Found to be anemic with acute on chronic renal insufficiency.  He may have potentially a multitude of reasons to have his current symptoms.  - Will order a nuclear stress test to evaluate for ischemia.  Patient known to have severe native multivessel CAD, so unless a large area is ischemic, may not consider heat cath, but will see what results show and make further plans after this has resulted.  Certainly, the development of a new stenosis could explain at least some of this patient's symptoms.  Also, if EF has declined on nuclear, we will plan a formal echocardiogram to evaluate further as well.  - Will defer any work-up for anemia to the primary service - no obvious blood loss however and was hemoccult negative in ER last night, reportedly.  Certainly with previous Hgb of 12-13, now at 9, in the setting of this patient's CAD, this could be a contributor to his shortness of breath.  - Would not think that the patient is in acute CHF - rhonchi in lungs today but no significant rales, CXR without significant edema and BNP normal; however, also does not appears to have PNA.  No reported history of COPD but is a former smoker.  No reason to consider PE as he is chronically anticoagulated.  - Patient is on amiodarone, so certainly could have developed toxicity from this medication that could explain his shortness of breath as well, although has been on a stable dose for many years (at least back to 2015).  - Thank you for this consultation, we will continue to follow closely.      Electronically signed by Gabriele Weinstein MD at 6/10/2019 12:04 PM       Physical Therapy Notes (most recent note)     No notes of this type exist for this encounter.            Respiratory Therapy Notes (most recent note)      Siena Ordaz CRT at 6/11/2019  9:22 AM          Exercise Oximetry    Patient Name:Ha Agrawal   MRN: [id number]   Date: 06/11/19             ROOM AIR BASELINE   SpO2% 95   Heart Rate 71   Blood Pressure      EXERCISE ON ROOM AIR SpO2% EXERCISE ON O2 @ 2 LPM SpO2%   1 MINUTE  1 MINUTE    2 MINUTES  2 MINUTES    3 MINUTES  3 MINUTES    4 MINUTES  4 MINUTES    5 MINUTES  5 MINUTES    6 MINUTES  6 MINUTES               Distance Walked  350 FT. Distance Walked 100 FT   Dyspnea ([place]by Scale) 3 Dyspnea (Corby Scale) 2   Fatigue ([place] Scale)   Fatigue ([place] Scale)   SpO2% Post Exercise  87 SpO2% Post Exercise 98   HR Post Exercise  122 HR Post Exercise 105   Time to Recovery   Time to Recovery     Comments:     Electronically signed by Siena Ordaz CRT at 6/11/2019  9:26 AM       Discharge Summary     No notes of this type exist for this encounter.

## 2019-06-13 NOTE — OUTREACH NOTE
Prep Survey      Responses   Facility patient discharged from?  [facility]   Is patient eligible?  Yes   Discharge diagnosis  Chest pain   Does the patient have one of the following disease processes/diagnoses(primary or secondary)?  Other   Does the patient have Home health ordered?  No   Is there a DME ordered?  Yes   What DME was ordered?  Oxygen per legacy    Prep survey completed?  Yes          Chuyita Reeves RN

## 2019-06-14 NOTE — OUTREACH NOTE
Medical Week 1 Survey      Responses   Facility patient discharged from?  [facility]   Does the patient have one of the following disease processes/diagnoses(primary or secondary)?  Other   Is there a successful TCM telephone encounter documented?  No   Week 1 attempt successful?  Yes   Call start time  1622   Call end time  1626   Discharge diagnosis  Chest pain   Meds reviewed with patient/caregiver?  Yes   Is the patient having any side effects they believe may be caused by any medication additions or changes?  No   Does the patient have all medications ordered at discharge?  Yes   Is the patient taking all medications as directed (includes completed medication regime)?  Yes   Does the patient have a primary care provider?   Yes   Does the patient have an appointment with their PCP within 7 days of discharge?  Yes   Has the patient kept scheduled appointments due by today?  Yes   Comments  not ready for cardiac rehab yet   Has home health visited the patient within 72 hours of discharge?  N/A   What DME was ordered?  Oxygen per legacy    Has all DME been delivered?  Yes   Psychosocial issues?  No   Did the patient receive a copy of their discharge instructions?  Yes   Nursing interventions  Reviewed instructions with patient   What is the patient's perception of their health status since discharge?  Improving   Is the patient/caregiver able to teach back signs and symptoms related to disease process for when to call PCP?  Yes   Is the patient/caregiver able to teach back signs and symptoms related to disease process for when to call 911?  Yes   Is the patient/caregiver able to teach back the hierarchy of who to call/visit for symptoms/problems? PCP, Specialist, Home health nurse, Urgent Care, ED, 911  Yes   Additional teach back comments  Wearing 2L O2 and says he is doing well he says as long as he wears the O2.   Week 1 call completed?  Yes          Ambika Mahoney RN

## 2019-06-19 NOTE — PROGRESS NOTES
Subjective:     Encounter Date:06/19/2019      Patient ID: Ha Agrawal is a 79 y.o. male.    Chief Complaint: Shortness of Breath. Fatigue.   Shortness of Breath   This is a recurrent problem. The current episode started more than 1 month ago. The problem occurs intermittently. The problem has been unchanged. Pertinent negatives include no abdominal pain, chest pain, fever, headaches, hemoptysis, leg swelling, orthopnea, PND, rash, syncope or vomiting. The symptoms are aggravated by any activity, exercise and fumes. Treatments tried: oxygen. His past medical history is significant for CAD.   Coronary Artery Disease   Presents for follow-up visit. Symptoms include muscle weakness and shortness of breath. Pertinent negatives include no chest pain, chest pressure, chest tightness, dizziness, leg swelling, palpitations or weight gain. Risk factors include hyperlipidemia. The symptoms have been stable. Compliance with diet is good. Compliance with exercise is poor. Compliance with medications is good.   Atrial Fibrillation   Presents for follow-up visit. Symptoms include shortness of breath and weakness. Symptoms are negative for chest pain, dizziness, palpitations and syncope. The symptoms have been stable. Past medical history includes atrial fibrillation, CAD and hyperlipidemia. There are no medication compliance problems.   Hypertension   This is a chronic problem. The current episode started more than 1 year ago. The problem is controlled. Associated symptoms include malaise/fatigue and shortness of breath. Pertinent negatives include no chest pain, headaches, orthopnea, palpitations or PND. Current antihypertension treatment includes ACE inhibitors and beta blockers. The current treatment provides significant improvement. There are no compliance problems.    Hyperlipidemia   This is a chronic problem. The current episode started more than 1 year ago. The problem is controlled. Recent lipid tests were  reviewed and are normal. Associated symptoms include shortness of breath. Pertinent negatives include no chest pain. Current antihyperlipidemic treatment includes statins. The current treatment provides significant improvement of lipids. There are no compliance problems.    Fatigue   This is a recurrent problem. The current episode started more than 1 month ago. The problem occurs intermittently. The problem has been unchanged. Associated symptoms include a change in bowel habit, fatigue and weakness. Pertinent negatives include no abdominal pain, chest pain, chills, coughing, fever, headaches, nausea, rash or vomiting. The symptoms are aggravated by exertion, bending and walking. Treatments tried: oxygen      Patient presents today for follow up after recent hospital admission. Patient was seen for left arm pain, shortness of breath and fatigue. Patient was found to have hypoxia and nocturnal hypoxia and is now on continuous oxygen. Patient was also found to have anemia- DILLON with positive occult blood. Hemoglobin was 9 with a drop from 13. Patient had a low risk stress after ruling out with negative troponins and no acute ekg changes. He had an echo which showed a normal EF, normal RV size and function, mild MR, trace-mild TR. BNP was normal. CXR essentially normal. Patient was started on doxycycline per primary team. Patient was set up to see his PCP with close follow up but due to vacation cannot get in to see her until July. This is first follow up from hospital and patient has had no blood work. He continues to have issues with yellow, liquid stools- that he states is like a babies. He states that he has not seen anymore blood in stool but does report he did have one episode prior to admission. He has never had a colonoscopy or seen GI. He is wearing oxygen which he states has helped some. Still with shortness of breath and weakness. He was given an Iron transfusion in hospital- but he is very curious if his  anemia is any worse today. He did have an overnight oxymetry done while in the hospital with was abnormal and qualified him for nocturnal oxygen. He is aware he needs to discuss further testing with PCP. Patient had aspirin stopped during hospital stay as he is on Brilinta and Xarelto. He had his amiodarone stopped as he is chronic a-fib and shortness of breath could be related to toxicity.   Patient has been followed for history of coronary artery disease with CABG and PCI to SVG to  in January, chronic atrial fibrillation, chronic diastolic congestive heart failure, chronic anticoagulation, hypertension, hyperlipidemia, stage III chronic disease, Type II Dm and obesity. He was actually seen in the office 6/4/19 for angina, dyspnea on exertion and fatigue. He had Imdur and Ranexa increased with minimal improvement. He has been asymptomatic per his report to his A-fib. He has had previous attempts at maintaining NSR with CV unsuccessful. Lipids are well controlled.   The following portions of the patient's history were reviewed and updated as appropriate: allergies, current medications, past family history, past medical history, past social history, past surgical history and problem list.   Prior to Admission medications    Medication Sig Start Date End Date Taking? Authorizing Provider   atorvastatin (LIPITOR) 40 MG tablet Take 1 tablet by mouth Every Night. 1/26/19  Yes Ricardo Andrade DO   diazepam (VALIUM) 5 MG tablet Take 5 mg by mouth Daily As Needed for Sleep.   Yes Sekou Hurtado MD   fenofibrate (TRICOR) 145 MG tablet Take 145 mg by mouth daily.   Yes Sekou Hurtado MD   furosemide (LASIX) 40 MG tablet Take 40 mg by mouth daily.   Yes Sekou Hurtado MD   Insulin Glargine (BASAGLAR KWIKPEN) 100 UNIT/ML injection pen Inject 80 Units under the skin into the appropriate area as directed Every Night.   Yes Sekou Hurtado MD   insulin regular (NOVOLIN R) 100 UNIT/ML injection  Inject  under the skin into the appropriate area as directed Daily. BG - 100 divided by 20.   Yes Sekou Hurtado MD   isosorbide mononitrate (IMDUR) 120 MG 24 hr tablet Take 1 tablet by mouth Daily. 6/4/19  Yes Rocio Andrew APRN   lisinopril (PRINIVIL,ZESTRIL) 5 MG tablet Take 5 mg by mouth Daily.   Yes Sekou Hurtado MD   metoprolol succinate XL (TOPROL-XL) 25 MG 24 hr tablet Take 75 mg by mouth Daily.   Yes Sekou Hurtado MD   Multiple Vitamins-Minerals (MULTIVITAMIN ADULT PO) Take 1 tablet by mouth Daily.   Yes Sekou Hurtado MD   nitroglycerin (NITROSTAT) 0.4 MG SL tablet Place 0.4 mg under the tongue Every 5 (Five) Minutes As Needed for Chest Pain. Take no more than 3 doses in 15 minutes.   Yes Sekou Hurtado MD   Omega-3 Fatty Acids (FISH OIL) 1200 MG capsule delayed-release Take 1,200 mg by mouth 2 (two) times a day.   Yes Sekou Hurtado MD   pantoprazole (PROTONIX) 20 MG EC tablet Take 20 mg by mouth Daily.   Yes ProviderSekou MD   potassium chloride (K-DUR,KLOR-CON) 10 MEQ CR tablet Take 20 mEq by mouth Daily.   Yes Sekou Hurtado MD   ranolazine (RANEXA) 1000 MG 12 hr tablet Take 1 tablet by mouth 2 (Two) Times a Day. 6/4/19  Yes Rocio Andrew APRN   rivaroxaban (XARELTO) 20 MG tablet Take 1 tablet by mouth Daily With Dinner. 6/12/19  Yes Yue Leary APRN   sertraline (ZOLOFT) 25 MG tablet Take 25 mg by mouth daily.   Yes Sekou Hurtado MD   sodium bicarbonate 650 MG tablet Take 650 mg by mouth 2 (Two) Times a Day.   Yes Sekou Hurtado MD   tamsulosin (FLOMAX) 0.4 MG capsule 24 hr capsule Take 1 capsule by mouth daily.   Yes Sekou Hurtado MD   ticagrelor (BRILINTA) 90 MG tablet tablet Take 1 tablet by mouth 2 (Two) Times a Day. 1/26/19  Yes Praneeth Deisy ECLARIBEL   vitamin D (ERGOCALCIFEROL) 28031 UNITS capsule capsule Take 50,000 Units by mouth 1 (One) Time Per Week. Monday.   Yes Provider, MD Sekou    vitamin E 1000 UNIT capsule Take 1,000 Units by mouth daily.   Yes Provider, MD Sekou   azelastine (ASTELIN) 0.1 % nasal spray 2 sprays into the nostril(s) as directed by provider 2 (Two) Times a Day As Needed for Rhinitis or Allergies. Use in each nostril as directed    ProviderSekou MD   doxycycline (ADOXA) 100 MG tablet Take 1 tablet by mouth Every 12 (Twelve) Hours for 12 doses. 6/12/19 6/18/19  Yue Leary APRN     Past Medical History:   Diagnosis Date   • Angina pectoris (CMS/[facility])    • Chronic kidney disease    • Coronary artery disease involving native coronary artery of native heart without angina pectoris 3/10/2017   • Diabetes mellitus (CMS/[facility])    • Essential hypertension    • Hx of CABG 1/11/2018   • Mixed hyperlipidemia    • Paroxysmal atrial fibrillation (CMS/[facility])    • Shortness of breath    • Tobacco use        Review of Systems   Constitution: Positive for fatigue, weakness and malaise/fatigue. Negative for chills, decreased appetite, fever, weight gain and weight loss.   HENT: Negative for nosebleeds.    Eyes: Negative for visual disturbance.   Cardiovascular: Positive for dyspnea on exertion. Negative for chest pain, leg swelling, near-syncope, orthopnea, palpitations, paroxysmal nocturnal dyspnea and syncope.   Respiratory: Positive for shortness of breath and sleep disturbances due to breathing. Negative for chest tightness, cough, hemoptysis and snoring.    Endocrine: Negative for cold intolerance and heat intolerance.   Hematologic/Lymphatic: Negative for bleeding problem. Does not bruise/bleed easily.   Skin: Negative for rash.   Musculoskeletal: Positive for muscle weakness. Negative for back pain and falls.   Gastrointestinal: Positive for bloating, change in bowel habit, diarrhea and hematochezia. Negative for abdominal pain, constipation, heartburn, melena, nausea and vomiting.   Genitourinary: Negative for hematuria.   Neurological: Negative for dizziness,  "headaches and light-headedness.   Psychiatric/Behavioral: Negative for altered mental status.   Allergic/Immunologic: Negative for persistent infections.       Procedures       Objective:     Physical Exam   Constitutional: He is oriented to person, place, and time. He appears well-developed and well-nourished.   obese   HENT:   Head: Normocephalic and atraumatic.   Eyes: Pupils are equal, round, and reactive to light.   Neck: Normal range of motion. Neck supple. No JVD present. Carotid bruit is not present.   Cardiovascular: Normal rate, normal heart sounds and intact distal pulses. An irregular rhythm present.   Pulmonary/Chest: Effort normal. Tachypnea noted. He has decreased breath sounds.   Nasal cannula    Abdominal: Soft. Bowel sounds are normal.   obese   Musculoskeletal: Normal range of motion. He exhibits no edema.   Neurological: He is alert and oriented to person, place, and time. He has normal reflexes.   Skin: Skin is warm and dry.   Psychiatric: He has a normal mood and affect. His behavior is normal. Judgment and thought content normal.     Blood pressure 126/62, pulse 71, height 185.4 cm (73\"), weight 118 kg (261 lb), SpO2 98 %.      Lab Review:       Assessment:          Diagnosis Plan   1. Shortness of breath  Full Pulmonary Function Test & ABG With Bronchodilator   2. Blood in stool  Ambulatory Referral to Gastroenterology   3. Change in bowel habit  Ambulatory Referral to Gastroenterology   4. Iron deficiency anemia, unspecified iron deficiency anemia type     5. Coronary artery disease involving native coronary artery of native heart with angina pectoris (CMS/HCC)     6. Chronic diastolic congestive heart failure (CMS/HCC)     7. Chronic atrial fibrillation (CMS/HCC)     8. Essential hypertension     9. Mixed hyperlipidemia     10. Respiratory failure with hypoxia, unspecified chronicity (CMS/HCC)            Plan:       1. Shortness of Breath- likely multifactorial- obesity, chronic diastolic " "congestive heart failure, anemia, hypoxia, chronic coronary artery disease. Recent negative stress test during admission and essentially normal BNP with no edema on CXR. Was placed on continuous oxygen during recent hospital stay. Will check PFTs- likely will need referral to pulmonary. Has not been since discharge and PCP cannot see patient until July as she is on vacation. Will check repeat CBC to evaluate anemia.  2/3. Occult positive stool with anemia during recent admission. Notes \"poop is like baby poop\" had noticed blood in stool. Was given Iron infusion during admission. Will refer to GI as patient is not able to see PCP until July. Wishes to see Dr. Dumont. Has never had colonoscopy or endoscopy.   4. DILLON- new diagnosis during hospitalization. Hemoglobin 9 down from 13 in January. Was given Iron transfusion. Will repeat CBC today as patient is pale and short of breath. Referral to GI. Needs to follow up with PCP as soon as able in July.   5. Coronary Artery Disease- history of CABG and stent to SVG to  1/25/19. Negative Nuclear. On Brilinta and Xarelto- aspirin stopped during recent hospital stay. No chest pain. On Imdur and Ranexa.  6. Chronic diastolic congestive heart failure- euvolemic, no edema on C-XRay, BNP normal. Euvolemic.  7. Chronic a-fib- patient states \" I can never tell if I'm out of rhythm or not\". Chronic. Anticoagulated with Xarelto. Multiple unsuccessful CV.   8. Blood Pressure- controlled.   9. Mixed Hyperlipidemia- LDL at goal of less than 70 on statin  10. Respiratory Failure with Hypoxia- now on oxygen at home. Needs sleep study- will defer to PCP. Check PFTs consider referral to pulmonary. May consider CT of chest in future- will address once results of PFTs are back.     Reviewed last cath, echo and stress with patient. Reviewed most recent lab work.   Current outpatient and discharge medications have been reconciled for the patient.  Reviewed by: CLARIBEL Urias         "

## 2019-06-22 NOTE — OUTREACH NOTE
Medical Week 2 Survey      Responses   Facility patient discharged from?  [place]   Does the patient have one of the following disease processes/diagnoses(primary or secondary)?  Other   Week 2 attempt successful?  Yes   Call start time  1321   Discharge diagnosis  Chest pain   Call end time  1322   Is patient permission given to speak with other caregiver?  Yes   List who call center can speak with  [place]- Wife   Person spoke with today (if not patient) and relationship  [place]- Wife   Meds reviewed with patient/caregiver?  Yes   Is the patient having any side effects they believe may be caused by any medication additions or changes?  No   Does the patient have all medications ordered at discharge?  Yes   Is the patient taking all medications as directed (includes completed medication regime)?  Yes   Does the patient have a primary care provider?   Yes   Does the patient have an appointment with their PCP within 7 days of discharge?  Yes   Has the patient kept scheduled appointments due by today?  Yes   Psychosocial issues?  No   Did the patient receive a copy of their discharge instructions?  Yes   Nursing interventions  Reviewed instructions with patient   What is the patient's perception of their health status since discharge?  Improving   Is the patient/caregiver able to teach back signs and symptoms related to disease process for when to call PCP?  Yes   Is the patient/caregiver able to teach back signs and symptoms related to disease process for when to call 911?  Yes   Is the patient/caregiver able to teach back the hierarchy of who to call/visit for symptoms/problems? PCP, Specialist, Home health nurse, Urgent Care, ED, 911  Yes   Week 2 Call Completed?  Yes          Sunita Rowland RN

## 2019-07-03 NOTE — OUTREACH NOTE
Medical Week 4 Survey      Responses   Facility patient discharged from?  [place]   Does the patient have one of the following disease processes/diagnoses(primary or secondary)?  Other   Week 4 attempt successful?  Yes   Call start time  0946   Call end time  0953   Discharge diagnosis  Chest pain   Meds reviewed with patient/caregiver?  Yes   Is the patient having any side effects they believe may be caused by any medication additions or changes?  Yes   Side effects comments   bradycardia--states HR runnning in the 30's   Is the patient taking all medications as directed (includes completed medication regime)?  Yes   Has the patient kept scheduled appointments due by today?  Yes   Comments  going out the door to see his PCP   Is the patient still receiving Home Health Services?  N/A   What is the patient's perception of their health status since discharge?  Worsening   Is the patient/caregiver able to teach back signs and symptoms related to disease process for when to call PCP?  Yes   Is the patient/caregiver able to teach back signs and symptoms related to disease process for when to call 911?  Yes   Is the patient/caregiver able to teach back the hierarchy of who to call/visit for symptoms/problems? PCP, Specialist, Home health nurse, Urgent Care, ED, 911  Yes   Additional teach back comments  Pt sattes his HR has been riunning low--30's at times and he gets dizzy and SOA. I have told him to discuss this with his PCP (he is going out the door to see PCP now).    Week 4 Call Completed?  Yes   Would the patient like one additional call?  Yes          Yaz Orosco RN

## 2019-07-09 NOTE — PROGRESS NOTES
"Chief Complaint   Patient presents with   • Anemia     anemia and had stool card showed blood never had colon had endo years ago       PCP: Kevin Latham APRN  REFER: Rd Canela APRN    Subjective     HPI     Hospitalized June 2019 for chest pain.  Cardiac stent placement in jan 2019.  Stool in hospital (June 2019) initially heme negative, recheck found stool to be positive.  History of anemia, \"whole life.\"  No visible bright red blood per rectum, melena.  No weight loss. No abdominal pain.  Oxygen therapy initiated 2 weeks ago.  Frequent SOB.  Denies frequent use of NSAIDs.     No previous colonoscopy or egd    Lab Results   Component Value Date    HGB 10.6 (L) 07/09/2019    HGB 10.1 (L) 06/19/2019    HGB 9.1 (L) 06/12/2019    MCV 97.4 (H) 07/09/2019    .9 (H) 06/19/2019    MCV 96.9 (H) 06/12/2019         Past Medical History:   Diagnosis Date   • Angina pectoris (CMS/HCC)    • Chronic kidney disease    • Coronary artery disease involving native coronary artery of native heart without angina pectoris 3/10/2017   • Diabetes mellitus (CMS/HCC)    • Essential hypertension    • Hx of CABG 1/11/2018   • Mixed hyperlipidemia    • Paroxysmal atrial fibrillation (CMS/HCC)    • Shortness of breath    • Tobacco use        Past Surgical History:   Procedure Laterality Date   • CARDIAC CATHETERIZATION     • CARDIAC CATHETERIZATION N/A 1/25/2019    Procedure: Left Heart Cath, coronaries, grafts, possible;  Surgeon: Gabriele Weinstein MD;  Location: [facility] CATH INVASIVE LOCATION;  Service: Cardiology   • CATARACT EXTRACTION     • CORONARY ARTERY BYPASS GRAFT     • CORONARY STENT PLACEMENT  03/2015       Outpatient Medications Marked as Taking for the 7/9/19 encounter (Office Visit) with Venkatesh Dixon APRN   Medication Sig Dispense Refill   • atorvastatin (LIPITOR) 40 MG tablet Take 1 tablet by mouth Every Night. 30 tablet 2   • azelastine (ASTELIN) 0.1 % nasal spray 2 sprays into the nostril(s) as " directed by provider 2 (Two) Times a Day As Needed for Rhinitis or Allergies. Use in each nostril as directed     • diazepam (VALIUM) 5 MG tablet Take 5 mg by mouth Daily As Needed for Sleep.     • fenofibrate (TRICOR) 145 MG tablet Take 145 mg by mouth daily.     • furosemide (LASIX) 40 MG tablet Take 40 mg by mouth daily.     • Insulin Glargine (BASAGLAR KWIKPEN) 100 UNIT/ML injection pen Inject 80 Units under the skin into the appropriate area as directed Every Night.     • insulin regular (NOVOLIN R) 100 UNIT/ML injection Inject  under the skin into the appropriate area as directed Daily. BG - 100 divided by 20.     • isosorbide mononitrate (IMDUR) 120 MG 24 hr tablet Take 1 tablet by mouth Daily. 90 tablet 3   • lisinopril (PRINIVIL,ZESTRIL) 5 MG tablet Take 5 mg by mouth Daily.     • metoprolol succinate XL (TOPROL-XL) 25 MG 24 hr tablet Take 75 mg by mouth Daily.     • Multiple Vitamins-Minerals (MULTIVITAMIN ADULT PO) Take 1 tablet by mouth Daily.     • nitroglycerin (NITROSTAT) 0.4 MG SL tablet Place 0.4 mg under the tongue Every 5 (Five) Minutes As Needed for Chest Pain. Take no more than 3 doses in 15 minutes.     • Omega-3 Fatty Acids (FISH OIL) 1200 MG capsule delayed-release Take 1,200 mg by mouth 2 (two) times a day.     • pantoprazole (PROTONIX) 20 MG EC tablet Take 20 mg by mouth Daily.     • potassium chloride (K-DUR,KLOR-CON) 10 MEQ CR tablet Take 20 mEq by mouth Daily.     • ranolazine (RANEXA) 1000 MG 12 hr tablet Take 1 tablet by mouth 2 (Two) Times a Day. 180 tablet 3   • rivaroxaban (XARELTO) 20 MG tablet Take 1 tablet by mouth Daily With Dinner. 30 tablet 1   • sertraline (ZOLOFT) 25 MG tablet Take 25 mg by mouth daily.     • sodium bicarbonate 650 MG tablet Take 650 mg by mouth 2 (Two) Times a Day.     • tamsulosin (FLOMAX) 0.4 MG capsule 24 hr capsule Take 1 capsule by mouth daily.     • ticagrelor (BRILINTA) 90 MG tablet tablet Take 1 tablet by mouth 2 (Two) Times a Day. 60 tablet 11   •  vitamin D (ERGOCALCIFEROL) 48286 UNITS capsule capsule Take 50,000 Units by mouth 1 (One) Time Per Week. Monday.     • vitamin E 1000 UNIT capsule Take 1,000 Units by mouth daily.         Allergies   Allergen Reactions   • Adhesive Tape Dermatitis   • Penicillins Rash       Social History     Socioeconomic History   • Marital status:      Spouse name: Not on file   • Number of children: Not on file   • Years of education: Not on file   • Highest education level: Not on file   Tobacco Use   • Smoking status: Former Smoker     Types: Pipe     Last attempt to quit: 2004     Years since quitting: 15.5   • Smokeless tobacco: Never Used   • Tobacco comment: QUIT AT AGE 63   Substance and Sexual Activity   • Alcohol use: Yes     Comment: Socially   • Drug use: No   • Sexual activity: Defer       Family History   Problem Relation Age of Onset   • Heart attack Mother    • Heart disease Mother    • Heart attack Father    • Heart disease Father    • Heart disease Brother    • Colon polyps Brother    • Heart disease Maternal Grandmother    • Heart attack Maternal Grandmother    • Heart disease Maternal Grandfather    • Heart attack Maternal Grandfather    • Heart disease Paternal Grandmother    • Heart attack Paternal Grandmother    • Heart disease Paternal Grandfather    • Heart attack Paternal Grandfather    • Alzheimer's disease Sister    • Colon cancer Neg Hx    • Esophageal cancer Neg Hx        Review of Systems   Constitutional: Negative for fatigue, fever and unexpected weight change.   HENT: Negative for hearing loss, sore throat and voice change.    Eyes: Negative for visual disturbance.   Respiratory: Negative for cough, shortness of breath and wheezing.    Cardiovascular: Negative for chest pain and palpitations.   Gastrointestinal: Positive for blood in stool. Negative for abdominal pain and vomiting.   Endocrine: Negative for polydipsia and polyuria.   Genitourinary: Negative for difficulty urinating,  "dysuria, hematuria and urgency.   Musculoskeletal: Negative for joint swelling and myalgias.   Skin: Negative for color change, rash and wound.   Neurological: Negative for dizziness, tremors, seizures and syncope.   Hematological: Does not bruise/bleed easily.   Psychiatric/Behavioral: Negative for agitation and confusion. The patient is not nervous/anxious.        Objective     Vitals:    07/09/19 1252   BP: 130/68   Pulse: 89   Temp: 97 °F (36.1 °C)   SpO2: 98%   Weight: 121 kg (266 lb)   Height: 182.9 cm (72\")     Body mass index is 36.08 kg/m².    Physical Exam   Constitutional: He is oriented to person, place, and time. He appears well-developed and well-nourished. He is cooperative.   HENT:   Head: Normocephalic and atraumatic.   Eyes: Conjunctivae are normal. Pupils are equal, round, and reactive to light. No scleral icterus.   Neck: Normal range of motion. Neck supple. No JVD present. No thyroid mass and no thyromegaly present.   Cardiovascular: Normal rate, regular rhythm and normal heart sounds. Exam reveals no gallop and no friction rub.   No murmur heard.  Pulmonary/Chest: Effort normal and breath sounds normal. No accessory muscle usage. No respiratory distress. He has no wheezes. He has no rales.   O2 per NC   Abdominal: Soft. Normal appearance and bowel sounds are normal. He exhibits no distension, no ascites and no mass. There is no hepatosplenomegaly. There is no tenderness. There is no rebound and no guarding.   Musculoskeletal: Normal range of motion. He exhibits no edema or tenderness.     Vascular Status -  His right foot exhibits normal foot vasculature  and no edema. His left foot exhibits normal foot vasculature  and no edema.  Lymphadenopathy:     He has no cervical adenopathy.   Neurological: He is alert and oriented to person, place, and time. He has normal strength. Gait normal.   Skin: Skin is warm, dry and intact. No rash noted.       Imaging Results (most recent)     None    "       Body mass index is 36.08 kg/m².    Assessment/Plan     Ha was seen today for anemia.    Diagnoses and all orders for this visit:    Heme positive stool  -     FL Upper GI With Air Contrast; Future  -     CBC (No Diff)        * Surgery not found *    Concerned pt is not appropriate candidate for anesthesia due to SOB  Would recommend pulmonology evaluation prior to procedure if it is determined a scope is needed  My concerns explained to pt, they were in agreement  Any procedure that would be performed would be diagnostic only procedure as he is unable to stop anticoagulation     Patient's Body mass index is 36.08 kg/m². BMI is above normal parameters. Recommendations include: no follow up.      There are no Patient Instructions on file for this visit.

## 2019-07-10 NOTE — TELEPHONE ENCOUNTER
Please let pt know hgb is stable  No decrease  Currently 10.6, 2 weeks ago it was 10.1    Thank you

## 2019-07-11 NOTE — OUTREACH NOTE
Medical Week 5 Survey      Responses   Facility patient discharged from?  [facility]   Does the patient have one of the following disease processes/diagnoses(primary or secondary)?  Other   Week 5 attempt successful?  No          Rhea Malagon RN

## 2019-07-19 NOTE — TELEPHONE ENCOUNTER
Please let patient know UGI is normal  He is unable to stop blood thinner until Jan 2020,   I do not recommend proceeding with diagnosis only colonoscopy until evaluation by pulmonology due to his complain of shortness of breath    Thank you

## 2019-07-25 NOTE — TELEPHONE ENCOUNTER
Spoke with wife this morning. She verbally understood. She stated patient is seeing doctor in September for shortness of breath.

## 2019-08-13 PROBLEM — R06.02 SHORTNESS OF BREATH: Status: ACTIVE | Noted: 2019-01-01

## 2019-08-13 NOTE — CONSULTS
[facility] CONSULT NOTE    Referring Provider: Dr. Thom Goldsmith    Reason for Consultation: Shortness of breath    Chief Complaint   Patient presents with   • Shortness of Breath       Subjective .     History of present illness:  Ha Agrawal is a 79 y.o. male with a known PMH significant for coronary artery disease s/p coronary artery bypass grafting and coronary artery stenting, chronic diastolic congestive heart failure, chronic atrial fibrillation on chronic anticoagulation, hypertension, hyperlipidemia, stage III chronic kidney disease, type II diabetes mellitus and obesity. He complains of a two month history of increased shortness of breath. He reports chronic, mild bilateral lower extremity edema. He denies chest pain, palpitations, dizziness, syncope, orthopnea, PND or decreased stamina. He denies any signs of bleeding. EKG on arrival revealed chronic atrial fibrillation. Initial troponin level normal. BNP level normal. Creatinine 1.59. Other labs unremarkable. Chest xray revealed bilateral diffuse interstitial infiltrates with underlying cardiomegaly, likely a cardiogenic interstitial edema and no obvious  pleural effusion. Pt was last seen in cardiology office 6/19 and had PFTs ordered, which were abnormal. He is scheduled to be seen by pulmonology 9/3/19.     History  Past Medical History:   Diagnosis Date   • Angina pectoris (CMS/HCC)    • Chronic kidney disease    • Coronary artery disease involving native coronary artery of native heart without angina pectoris 3/10/2017   • Diabetes mellitus (CMS/HCC)    • Essential hypertension    • Hx of CABG 1/11/2018   • Mixed hyperlipidemia    • Paroxysmal atrial fibrillation (CMS/HCC)    • Shortness of breath    • Tobacco use    ,   Past Surgical History:   Procedure Laterality Date   • CARDIAC CATHETERIZATION     • CARDIAC CATHETERIZATION N/A 1/25/2019    Procedure: Left Heart Cath, coronaries, grafts, possible;  Surgeon: Js  Gabriele Esquivel MD;  Location:  [facility] CATH INVASIVE LOCATION;  Service: Cardiology   • CATARACT EXTRACTION     • CORONARY ARTERY BYPASS GRAFT     • CORONARY STENT PLACEMENT  03/2015   ,   Family History   Problem Relation Age of Onset   • Heart attack Mother    • Heart disease Mother    • Heart attack Father    • Heart disease Father    • Heart disease Brother    • Colon polyps Brother    • Heart disease Maternal Grandmother    • Heart attack Maternal Grandmother    • Heart disease Maternal Grandfather    • Heart attack Maternal Grandfather    • Heart disease Paternal Grandmother    • Heart attack Paternal Grandmother    • Heart disease Paternal Grandfather    • Heart attack Paternal Grandfather    • Alzheimer's disease Sister    • Colon cancer Neg Hx    • Esophageal cancer Neg Hx    ,   Social History     Tobacco Use   • Smoking status: Former Smoker     Types: Pipe     Last attempt to quit: 2004     Years since quitting: 15.6   • Smokeless tobacco: Never Used   • Tobacco comment: QUIT AT AGE 63   Substance Use Topics   • Alcohol use: Yes     Comment: Socially   • Drug use: No   ,     Medications  No current facility-administered medications for this encounter.      Current Outpatient Medications   Medication Sig Dispense Refill   • atorvastatin (LIPITOR) 40 MG tablet Take 1 tablet by mouth Every Night. 30 tablet 2   • azelastine (ASTELIN) 0.1 % nasal spray 2 sprays into the nostril(s) as directed by provider 2 (Two) Times a Day As Needed for Rhinitis or Allergies. Use in each nostril as directed     • diazepam (VALIUM) 5 MG tablet Take 5 mg by mouth Daily As Needed for Sleep.     • fenofibrate (TRICOR) 145 MG tablet Take 145 mg by mouth daily.     • furosemide (LASIX) 40 MG tablet Take 40 mg by mouth daily.     • Insulin Glargine (BASAGLAR KWIKPEN) 100 UNIT/ML injection pen Inject 80 Units under the skin into the appropriate area as directed Every Night.     • insulin regular (NOVOLIN R) 100 UNIT/ML injection Inject   under the skin into the appropriate area as directed Daily. BG - 100 divided by 20.     • isosorbide mononitrate (IMDUR) 120 MG 24 hr tablet Take 1 tablet by mouth Daily. 90 tablet 3   • lisinopril (PRINIVIL,ZESTRIL) 5 MG tablet Take 5 mg by mouth Daily.     • metoprolol succinate XL (TOPROL-XL) 25 MG 24 hr tablet Take 75 mg by mouth Daily.     • Multiple Vitamins-Minerals (MULTIVITAMIN ADULT PO) Take 1 tablet by mouth Daily.     • nitroglycerin (NITROSTAT) 0.4 MG SL tablet Place 0.4 mg under the tongue Every 5 (Five) Minutes As Needed for Chest Pain. Take no more than 3 doses in 15 minutes.     • Omega-3 Fatty Acids (FISH OIL) 1200 MG capsule delayed-release Take 1,200 mg by mouth 2 (two) times a day.     • pantoprazole (PROTONIX) 20 MG EC tablet Take 20 mg by mouth Daily.     • potassium chloride (K-DUR,KLOR-CON) 10 MEQ CR tablet Take 20 mEq by mouth Daily.     • ranolazine (RANEXA) 1000 MG 12 hr tablet Take 1 tablet by mouth 2 (Two) Times a Day. 180 tablet 3   • rivaroxaban (XARELTO) 20 MG tablet Take 1 tablet by mouth Daily With Dinner. 30 tablet 1   • sertraline (ZOLOFT) 25 MG tablet Take 25 mg by mouth daily.     • sodium bicarbonate 650 MG tablet Take 650 mg by mouth 2 (Two) Times a Day.     • tamsulosin (FLOMAX) 0.4 MG capsule 24 hr capsule Take 1 capsule by mouth daily.     • ticagrelor (BRILINTA) 90 MG tablet tablet Take 1 tablet by mouth 2 (Two) Times a Day. 60 tablet 11   • vitamin D (ERGOCALCIFEROL) 94550 UNITS capsule capsule Take 50,000 Units by mouth 1 (One) Time Per Week. Monday.     • vitamin E 1000 UNIT capsule Take 1,000 Units by mouth daily.         Allergies:  Adhesive tape and Penicillins    Review of Systems  Review of Systems   Constitution: Negative for chills, decreased appetite, fever, weakness, malaise/fatigue, night sweats, weight gain and weight loss.   HENT: Negative for nosebleeds.    Eyes: Negative for visual disturbance.   Cardiovascular: Negative for chest pain, dyspnea on  "exertion, leg swelling, near-syncope, orthopnea, palpitations, paroxysmal nocturnal dyspnea and syncope.   Respiratory: Positive for shortness of breath. Negative for cough, hemoptysis, snoring and wheezing.    Endocrine: Negative for cold intolerance and heat intolerance.   Hematologic/Lymphatic: Does not bruise/bleed easily.   Skin: Negative for rash.   Musculoskeletal: Negative for back pain and falls.   Gastrointestinal: Negative for abdominal pain, change in bowel habit, constipation, diarrhea, dysphagia, heartburn, nausea and vomiting.   Genitourinary: Negative for hematuria.   Neurological: Negative for dizziness, headaches and light-headedness.   Psychiatric/Behavioral: Negative for altered mental status.   Allergic/Immunologic: Negative for persistent infections.       Objective     Physical Exam:  Patient Vitals for the past 24 hrs:   BP Temp Temp src Pulse Resp SpO2 Height Weight   08/13/19 1601 132/59 -- -- -- -- 96 % -- --   08/13/19 1547 102/49 -- -- 83 -- 96 % -- --   08/13/19 1532 104/49 -- -- 74 -- 98 % -- --   08/13/19 1517 129/62 -- -- 77 -- 97 % -- --   08/13/19 1502 115/55 -- -- 83 -- 99 % -- --   08/13/19 1447 107/52 -- -- 79 -- 97 % -- --   08/13/19 1432 114/55 -- -- 79 -- 96 % -- --   08/13/19 1416 109/52 -- -- 76 -- 96 % -- --   08/13/19 1352 119/53 -- -- 83 -- 95 % -- --   08/13/19 1239 139/71 97.3 °F (36.3 °C) Oral 105 26 94 % 185.4 cm (73\") 120 kg (265 lb)     Physical Exam   Constitutional: He is oriented to person, place, and time. Vital signs are normal. He appears well-developed and well-nourished. No distress. Nasal cannula in place.   HENT:   Head: Normocephalic and atraumatic.   Right Ear: External ear normal.   Left Ear: External ear normal.   Eyes: Conjunctivae are normal. Pupils are equal, round, and reactive to light. Right eye exhibits no discharge. Left eye exhibits no discharge.   Neck: Normal range of motion. Neck supple. No JVD present. Carotid bruit is not present. No " thyromegaly present.   Cardiovascular: Normal rate, regular rhythm, normal heart sounds and intact distal pulses. PMI is not displaced. Exam reveals no gallop, no friction rub and no decreased pulses.   No murmur heard.  Pulses:       Radial pulses are 2+ on the right side, and 2+ on the left side.        Dorsalis pedis pulses are 2+ on the right side, and 2+ on the left side.        Posterior tibial pulses are 2+ on the right side, and 2+ on the left side.   Pulmonary/Chest: Effort normal. No respiratory distress. He has no decreased breath sounds. He has no wheezes. He has no rhonchi. He has no rales. He exhibits no tenderness.   Abdominal: Soft. Bowel sounds are normal. He exhibits no distension. There is no tenderness.   Musculoskeletal: Normal range of motion. He exhibits edema (MIld bilateral lower extremity edema).   Neurological: He is alert and oriented to person, place, and time.   Skin: Skin is warm and dry. No rash noted. He is not diaphoretic. No erythema. No pallor.   Psychiatric: He has a normal mood and affect. His behavior is normal. Judgment and thought content normal.   Vitals reviewed.      Results Review:   I reviewed the patient's new clinical results.    Lab Results (last 72 hours)     Procedure Component Value Units Date/Time    Comprehensive Metabolic Panel [[id number]]  (Abnormal) Collected:  08/13/19 1309    Specimen:  Blood Updated:  08/13/19 1435     Glucose 119 mg/dL      BUN 18 mg/dL      Creatinine 1.59 mg/dL      Sodium 140 mmol/L      Potassium 3.9 mmol/L      Chloride 105 mmol/L      CO2 24.0 mmol/L      Calcium 9.6 mg/dL      Total Protein 8.0 g/dL      Albumin 4.40 g/dL      ALT (SGPT) 21 U/L      AST (SGOT) 48 U/L      Alkaline Phosphatase 55 U/L      Total Bilirubin 0.6 mg/dL      eGFR Non African Amer 42 mL/min/1.73      Globulin 3.6 gm/dL      A/G Ratio 1.2 g/dL      BUN/Creatinine Ratio 11.3     Anion Gap 11.0 mmol/L     Narrative:       GFR Normal >60  Chronic Kidney Disease  <60  Kidney Failure <15    Red [place] [[id number]] Collected:  08/13/19 1307    Specimen:  Blood Updated:  08/13/19 1416     Extra Tube Hold for add-ons.     Comment: Auto resulted.       D-dimer, Quantitative [[id number]]  (Normal) Collected:  08/13/19 1309    Specimen:  Blood Updated:  08/13/19 1411     D-Dimer, Quantitative 0.27 mg/L (FEU)     Narrative:       Reference Range is 0-0.50 mg/L FEU. However, results <0.50 mg/L FEU tends to rule out DVT or PE. Results >0.50 mg/L FEU are not useful in predicting absence or presence of DVT or PE.    BNP [[id number]]  (Normal) Collected:  08/13/19 1309    Specimen:  Blood Updated:  08/13/19 1410     proBNP 912.0 pg/mL     Troponin [[id number]]  (Normal) Collected:  08/13/19 1309    Specimen:  Blood Updated:  08/13/19 1410     Troponin I <0.012 ng/mL     CBC & Differential [[id number]] Collected:  08/13/19 1309    Specimen:  Blood Updated:  08/13/19 1345    Narrative:       The following orders were created for panel order CBC & Differential.  Procedure                               Abnormality         Status                     ---------                               -----------         ------                     CBC Auto Differential[[id number]]        Abnormal            Final result                 Please view results for these tests on the individual orders.    CBC Auto Differential [[id number]]  (Abnormal) Collected:  08/13/19 1309    Specimen:  Blood Updated:  08/13/19 1345     WBC 5.77 10*3/mm3      RBC 3.39 10*6/mm3      Hemoglobin 10.2 g/dL      Hematocrit 31.9 %      MCV 94.1 fL      MCH 30.1 pg      MCHC 32.0 g/dL      RDW 16.7 %      RDW-SD 57.2 fl      MPV 9.7 fL      Platelets 275 10*3/mm3      Neutrophil % 67.0 %      Lymphocyte % 17.9 %      Monocyte % 11.6 %      Eosinophil % 2.8 %      Basophil % 0.5 %      Immature Grans % 0.2 %      Neutrophils, Absolute 3.87 10*3/mm3      Lymphocytes, Absolute 1.03 10*3/mm3      Monocytes, Absolute 0.67 10*3/mm3       Eosinophils, Absolute 0.16 10*3/mm3      Basophils, Absolute 0.03 10*3/mm3      Immature Grans, Absolute 0.01 10*3/mm3      nRBC 0.0 /100 WBC     Blood Gas, Arterial [[id number]]  (Abnormal) Collected:  08/13/19 1319    Specimen:  Arterial Blood Updated:  08/13/19 1318     Site Left Radial     Valentin's Test Positive     pH, Arterial 7.446 pH units      pCO2, Arterial 35.5 mm Hg      pO2, Arterial 80.7 mm Hg      Comment: 84 Value below reference range        HCO3, Arterial 24.4 mmol/L      Base Excess, Arterial 0.6 mmol/L      O2 Saturation, Arterial 96.8 %      Temperature 37.0 C      Barometric Pressure for Blood Gas 747 mmHg      Modality Nasal Cannula     Flow Rate 2.0 lpm      Ventilator Mode NA     Collected by [id number]     Comment: Meter: [id number]     :  [id number]             Imaging Results (last 72 hours)     Procedure Component Value Units Date/Time    XR Chest 1 View [[id number]] Collected:  08/13/19 1408     Updated:  08/13/19 1413    Narrative:       XR CHEST 1 VW- 8/13/2019 1:21 PM CDT     HISTORY: soa       COMPARISON: Chest exam dated 06/09/2019.     FINDINGS:   Underlying cardiomegaly with central pulmonary congestion. There are  bilateral diffuse interstitial infiltrates. No dense consolidation. No  obvious pleural effusion. No pneumothorax. Prior coronary bypass.       The osseous structures and surrounding soft tissues demonstrate no acute  abnormality.       Impression:       1. There are bilateral diffuse interstitial infiltrates with underlying  cardiomegaly, likely a cardiogenic interstitial edema. No obvious  pleural effusion.        This report was finalized on 08/13/2019 14:09 by Dr Arcadio Mohamud, .        Assessment       Shortness of breath    Chronic diastolic congestive heart failure (CMS/HCC)    Chronic atrial fibrillation (CMS/HCC)    History of coronary artery stent placement    Chronic anticoagulation    Coronary artery disease involving native coronary artery of native  heart with angina pectoris (CMS/HCC)    Hx of CABG    Stage 3 chronic kidney disease (CMS/[facility])    Mixed hyperlipidemia    Type 2 diabetes mellitus with chronic kidney disease, with long-term current use of insulin (CMS/[facility])    Essential hypertension      Plan      1. Chronic diastolic congestive heart failure- given normal BNP, unsure of correlation of symptoms with CHF. Does not appear volume overloaded by exam, however signs of pulmonary congestion on chest xray. Give one time dose of lasix 40 mg IV. Stable for discharge from cardiology standpoint. Keep scheduled follow up with Dr. Weinstein on 9/5/19. Continue previously prescribed medications. Keep scheduled appointment with pulmonology.   2. Chronic afib- rate controlled and anticoagulated.   3. CAD s/p CABG- stable.   4. CKD- creatinine stable.   5. HTN- well controlled.   6. HLD- well controlled.     Further orders per Dr. Weinstein upon his evaluation of the patient.     Thank you for the consultation, cardiology will gladly continue to follow.     CLARIBEL Yu  8/13/2019  4:23 PM      Please note this cardiology consultation note is the result of a face to face consultation with the patient, in addition to reviewing medical records at length by myself, CLARIBEL Yu.       Time: 45 minutes

## 2019-08-13 NOTE — ED NOTES
MD notified of pt screening positive in triage. Dr. Goldsmith states that he will make a note in his charting that pt is not sepsis.      Joellen Moe, RN  08/13/19 [id number]

## 2019-08-13 NOTE — ED PROVIDER NOTES
Subjective     Shortness of Breath   Severity:  Moderate  Onset quality:  Gradual  Timing:  Constant  Progression:  Worsening  Chronicity:  Recurrent  Context: not activity, not animal exposure, not emotional upset, not occupational exposure, not pollens, not smoke exposure, not URI and not weather changes    Relieved by:  Nothing  Worsened by:  Nothing  Ineffective treatments:  None tried  Associated symptoms: PND    Associated symptoms: no abdominal pain, no chest pain, no claudication, no cough, no diaphoresis, no ear pain, no headaches, no hemoptysis, no neck pain, no syncope, no swollen glands and no vomiting    Risk factors: obesity    Risk factors: no family hx of DVT        Review of Systems   Constitutional: Negative.  Negative for diaphoresis.   HENT: Negative.  Negative for ear pain.    Respiratory: Positive for shortness of breath. Negative for cough and hemoptysis.    Cardiovascular: Positive for PND. Negative for chest pain, claudication and syncope.   Gastrointestinal: Negative.  Negative for abdominal distention, abdominal pain, nausea and vomiting.   Endocrine: Negative.    Genitourinary: Negative.    Musculoskeletal: Negative.  Negative for back pain and neck pain.   Skin: Negative for color change and pallor.   Neurological: Negative.  Negative for syncope, weakness, light-headedness, numbness and headaches.   Hematological: Negative.  Does not bruise/bleed easily.   All other systems reviewed and are negative.      Past Medical History:   Diagnosis Date   • Angina pectoris (CMS/HCC)    • Chronic kidney disease    • Coronary artery disease involving native coronary artery of native heart without angina pectoris 3/10/2017   • Diabetes mellitus (CMS/HCC)    • Essential hypertension    • Hx of CABG 1/11/2018   • Mixed hyperlipidemia    • Paroxysmal atrial fibrillation (CMS/HCC)    • Shortness of breath    • Tobacco use        Allergies   Allergen Reactions   • Adhesive Tape Dermatitis   • Penicillins  Rash       Past Surgical History:   Procedure Laterality Date   • CARDIAC CATHETERIZATION     • CARDIAC CATHETERIZATION N/A 1/25/2019    Procedure: Left Heart Cath, coronaries, grafts, possible;  Surgeon: Gabriele Weinstein MD;  Location: [facility] CATH INVASIVE LOCATION;  Service: Cardiology   • CATARACT EXTRACTION     • CORONARY ARTERY BYPASS GRAFT     • CORONARY STENT PLACEMENT  03/2015       Family History   Problem Relation Age of Onset   • Heart attack Mother    • Heart disease Mother    • Heart attack Father    • Heart disease Father    • Heart disease Brother    • Colon polyps Brother    • Heart disease Maternal Grandmother    • Heart attack Maternal Grandmother    • Heart disease Maternal Grandfather    • Heart attack Maternal Grandfather    • Heart disease Paternal Grandmother    • Heart attack Paternal Grandmother    • Heart disease Paternal Grandfather    • Heart attack Paternal Grandfather    • Alzheimer's disease Sister    • Colon cancer Neg Hx    • Esophageal cancer Neg Hx        Social History     Socioeconomic History   • Marital status:      Spouse name: Not on file   • Number of children: Not on file   • Years of education: Not on file   • Highest education level: Not on file   Tobacco Use   • Smoking status: Former Smoker     Types: Pipe     Last attempt to quit: 2004     Years since quitting: 15.6   • Smokeless tobacco: Never Used   • Tobacco comment: QUIT AT AGE 63   Substance and Sexual Activity   • Alcohol use: Yes     Comment: Socially   • Drug use: No   • Sexual activity: Defer           Objective   Physical Exam   Constitutional: He is oriented to person, place, and time. He appears well-developed.  Non-toxic appearance.   HENT:   Head: Normocephalic and atraumatic.   Mouth/Throat: Uvula is midline and mucous membranes are normal.   Eyes: Conjunctivae and lids are normal. Pupils are equal, round, and reactive to light. Lids are everted and swept, no foreign bodies found.   Neck:  Trachea normal, normal range of motion, full passive range of motion without pain and phonation normal. Neck supple. Normal carotid pulses and no JVD present. Carotid bruit is not present. No neck rigidity. No tracheal deviation present.   Cardiovascular: Normal heart sounds, intact distal pulses and normal pulses. An irregularly irregular rhythm present. Tachycardia present. PMI is not displaced.   Pulmonary/Chest: Effort normal and breath sounds normal. No accessory muscle usage or stridor. No apnea and no tachypnea. He has no decreased breath sounds. He has no wheezes. He has no rhonchi. He has no rales.   Abdominal: Soft. Normal appearance, normal aorta and bowel sounds are normal. There is no hepatosplenomegaly. There is no tenderness.   Musculoskeletal: Normal range of motion.   Ext edema    Neurological: He is alert and oriented to person, place, and time. He has normal strength and normal reflexes. He displays normal reflexes. No cranial nerve deficit or sensory deficit. Gait normal. GCS eye subscore is 4. GCS verbal subscore is 5. GCS motor subscore is 6.   Skin: Skin is warm, dry and intact. No cyanosis. No pallor. Nails show no clubbing.   Psychiatric: He has a normal mood and affect. His speech is normal and behavior is normal.   Nursing note and vitals reviewed.      Procedures           ED Course  ED Course as of Aug 13 1629   Tue Aug 13, 2019   1303 · Left ventricular systolic function is normal. Estimated EF appears to be in the range of 61 - 65%.  · Normal right ventricular cavity size and systolic function noted.  · Mild mitral valve regurgitation is present  · Trace to mild tricuspid valve regurgitation is present. Estimated right ventricular systolic pressure from tricuspid regurgitation is normal (<35 mmHg).  [TS]   1303 · Myocardial perfusion imaging indicates a normal myocardial perfusion study with no evidence of ischemia.  · Left ventricular ejection fraction is normal (Calculated EF =  67%).  · Impressions are consistent with a low risk study.  [TS]   1310 Pt is not septic surge screen neg   [TS]   1502 Cardiology consult  [TS]   1626 Case discussed at length with the patient his recent echo and stress testing was reviewed his lab work-up essentially is unremarkable at his baseline cardiology was consulted they came and saw the patient and ordered a dose of Lasix and made a discharge plan for him to be discharged home with a follow-up with them as an outpatient  [TS]   1626  The patient's symptoms are now better.  Patient is not having pain.  No chest pain, difficulty breathing, nausea, vomiting or palpitations.  No anxiety or dizziness.  Vital signs have been reviewed and appear to be correct.  Physical exam findings are improved.  Alert.  Oriented X3 .  No acute distress.  Breath sounds normal.  No respiratory distress, decreased breath sounds or wheezes.  Normal heart rate and rhythm.  Heart sounds normal.  .  Abdomen soft and nontender.  No abdominal tenderness or guarding or rebound tenderness.  Skin warm and dry.  No cyanosis or diaphoresis.  Oriented X 3.  Not anxious.  No alteration in mental status or weakness.          [TS]      ED Course User Index  [TS] Thom Goldmsith MD                  [facility]      Final diagnoses:   Dyspnea, unspecified type   Chronic kidney disease, unspecified CKD stage            Thom Goldsmith MD  08/13/19 [id number]

## 2019-08-26 PROBLEM — Z87.891 PERSONAL HISTORY OF NICOTINE DEPENDENCE: Status: ACTIVE | Noted: 2019-01-01

## 2019-09-03 NOTE — PATIENT INSTRUCTIONS

## 2019-09-04 NOTE — PROGRESS NOTES
Patient's wife notified of test results and I am forwarding this on to his PCP as well as the office note.

## 2019-09-05 NOTE — PROGRESS NOTES
Subjective:     Encounter Date:09/05/2019      Patient ID: Ha Agrawal is a 79 y.o. male with coronary artery disease, status post previous coronary artery bypass grafting as well as PCI to the saphenous vein graft to the obtuse marginal in January 2019, chronic diastolic dysfunction, paroxysmal atrial fibrillation/flutter, hypertension, hyperlipidemia, type 2 diabetes mellitus, stage III chronic kidney disease, chronic shortness of breath and dyspnea on exertion, presenting for follow-up.    Chief Complaint: Follow-up - shortness of breath    Coronary Artery Disease   Presents for follow-up visit. Symptoms include shortness of breath. Pertinent negatives include no chest pain, dizziness, leg swelling, palpitations or weight gain. The symptoms have been stable. Compliance with diet is variable. Compliance with exercise is variable. Compliance with medications is good.   Shortness of Breath   This is a chronic problem. The problem occurs daily. The problem has been gradually improving. Associated symptoms include sputum production. Pertinent negatives include no abdominal pain, chest pain, claudication, headaches, hemoptysis, leg swelling, orthopnea, PND, syncope, vomiting or wheezing. The symptoms are aggravated by exercise. He has tried steroid inhalers for the symptoms. The treatment provided moderate relief. His past medical history is significant for CAD and chronic lung disease.      This patient presents today for follow-up.  He has a history of coronary artery disease, status post previous coronary artery bypass grafting as well as PCI.  No recent chest pain.  The patient has been followed for atrial fibrillation as well.  The patient remains in atrial fibrillation at this time.  He has failed multiple cardioversions in the past with going back in atrial fibrillation relatively quickly.  He denies any palpitations.  No lightheadedness, dizziness, syncope.  The patient has chronic shortness of breath  and dyspnea on exertion which has been thought to be likely multifactorial.  He recently saw pulmonology.  He was given a trial of bronchodilators with Symbicort and Asmanex.  In addition, the patient is set up for a formal sleep study.  Patient is wearing oxygen at this time as well.  The patient says that with his bronchodilators, he has started to have a productive cough.  His shortness of breath seems to be improving.  He describes coming in from his car into our clinic today and having much less shortness of breath than usual.  He thinks that already his bronchodilators are helping.  He is anxious to get a sleep study done in the near future as well.  No significant wheezing recently.  As stated he has had a productive cough of the past couple of days.  No fevers or chills.  He reports no problems or side effects from any of his medications.  He reports that his blood pressure is well controlled.  He denies orthopnea, PND or any significant increase in lower extremity edema (he notes that he does have some mild intermittent edema that is unchanged lately in parentheses.      Current Outpatient Medications:   •  atorvastatin (LIPITOR) 40 MG tablet, Take 1 tablet by mouth Every Night., Disp: 30 tablet, Rfl: 2  •  diazepam (VALIUM) 5 MG tablet, Take 5 mg by mouth Daily As Needed for Sleep., Disp: , Rfl:   •  fenofibrate (TRICOR) 145 MG tablet, Take 145 mg by mouth daily., Disp: , Rfl:   •  furosemide (LASIX) 40 MG tablet, Take 40 mg by mouth daily., Disp: , Rfl:   •  Insulin Glargine (BASAGLAR KWIKPEN) 100 UNIT/ML injection pen, Inject 80 Units under the skin into the appropriate area as directed Every Night., Disp: , Rfl:   •  insulin regular (NOVOLIN R) 100 UNIT/ML injection, Inject  under the skin into the appropriate area as directed Daily. BG - 100 divided by 20., Disp: , Rfl:   •  isosorbide mononitrate (IMDUR) 120 MG 24 hr tablet, Take 1 tablet by mouth Daily., Disp: 90 tablet, Rfl: 3  •  lisinopril  (PRINIVIL,ZESTRIL) 5 MG tablet, Take 5 mg by mouth Daily., Disp: , Rfl:   •  METOPROLOL SUCCINATE ER PO, Take 75 mg by mouth Daily., Disp: , Rfl:   •  Mometasone Furoate (ASMANEX HFA) 100 MCG/ACT aerosol, Inhale 1 puff 2 (Two) Times a Day for 14 days., Disp: 1 inhaler, Rfl: 0  •  Mometasone Furoate (ASMANEX HFA) 200 MCG/ACT aerosol, Inhale 1 puff 2 (Two) Times a Day for 14 days., Disp: 1 inhaler, Rfl: 0  •  Multiple Vitamins-Minerals (MULTIVITAMIN ADULT PO), Take 1 tablet by mouth Daily., Disp: , Rfl:   •  nitroglycerin (NITROSTAT) 0.4 MG SL tablet, Place 0.4 mg under the tongue Every 5 (Five) Minutes As Needed for Chest Pain. Take no more than 3 doses in 15 minutes., Disp: , Rfl:   •  Omega-3 Fatty Acids (FISH OIL) 1200 MG capsule delayed-release, Take 1,200 mg by mouth 2 (two) times a day., Disp: , Rfl:   •  pantoprazole (PROTONIX) 20 MG EC tablet, Take 20 mg by mouth Daily., Disp: , Rfl:   •  potassium chloride (K-DUR,KLOR-CON) 20 MEQ CR tablet, Take 20 mEq by mouth Daily., Disp: , Rfl:   •  ranolazine (RANEXA) 1000 MG 12 hr tablet, Take 1 tablet by mouth 2 (Two) Times a Day., Disp: 180 tablet, Rfl: 3  •  rivaroxaban (XARELTO) 20 MG tablet, Take 1 tablet by mouth Daily With Dinner., Disp: 30 tablet, Rfl: 1  •  sertraline (ZOLOFT) 25 MG tablet, Take 25 mg by mouth daily., Disp: , Rfl:   •  sodium bicarbonate 650 MG tablet, Take 650 mg by mouth 2 (Two) Times a Day., Disp: , Rfl:   •  tamsulosin (FLOMAX) 0.4 MG capsule 24 hr capsule, Take 1 capsule by mouth daily., Disp: , Rfl:   •  ticagrelor (BRILINTA) 90 MG tablet tablet, Take 1 tablet by mouth 2 (Two) Times a Day., Disp: 60 tablet, Rfl: 11  •  vitamin D (ERGOCALCIFEROL) 74239 UNITS capsule capsule, Take 50,000 Units by mouth 1 (One) Time Per Week. Monday., Disp: , Rfl:   •  vitamin E 1000 UNIT capsule, Take 1,000 Units by mouth daily., Disp: , Rfl:     Allergies   Allergen Reactions   • Adhesive Tape Dermatitis   • Penicillins Rash     Social History     Tobacco  Use   • Smoking status: Former Smoker     Types: Pipe     Last attempt to quit: 2004     Years since quitting: 15.7   • Smokeless tobacco: Never Used   • Tobacco comment: QUIT AT AGE 63   Substance Use Topics   • Alcohol use: Yes     Comment: Socially     Review of Systems   Constitution: Negative for weakness, weight gain and weight loss.   Cardiovascular: Positive for dyspnea on exertion. Negative for chest pain, claudication, leg swelling, orthopnea, palpitations, paroxysmal nocturnal dyspnea and syncope.   Respiratory: Positive for cough, shortness of breath and sputum production. Negative for hemoptysis and wheezing.    Endocrine: Negative for cold intolerance and heat intolerance.   Hematologic/Lymphatic: Negative for bleeding problem. Does not bruise/bleed easily.   Gastrointestinal: Negative for abdominal pain, hematemesis, hematochezia, melena, nausea and vomiting.   Neurological: Negative for dizziness, headaches and loss of balance.       ECG 12 Lead  Date/Time: 9/5/2019 11:50 AM  Performed by: Gabriele Weinstein MD  Authorized by: Gabriele Weinstein MD   Comparison: compared with previous ECG from 8/13/2019  Similar to previous ECG  Rhythm: atrial fibrillation  Rate: normal  BPM: 79  Conduction: conduction normal  QRS axis: normal  Other findings: non-specific ST-T wave changes    Clinical impression: abnormal EKG             Objective:     Physical Exam   Constitutional: He is oriented to person, place, and time. He appears well-developed and well-nourished. No distress.   HENT:   Head: Normocephalic and atraumatic.   Mouth/Throat: Oropharynx is clear and moist.   Eyes: EOM are normal. Pupils are equal, round, and reactive to light.   Neck: Normal range of motion. Neck supple. No JVD present. No thyromegaly present.   Cardiovascular: Normal rate, S1 normal, S2 normal, normal heart sounds and intact distal pulses. An irregularly irregular rhythm present. Exam reveals no gallop and no friction  "rub.   No murmur heard.  Pulmonary/Chest: Effort normal and breath sounds normal.   Abdominal: Soft. Bowel sounds are normal. He exhibits no distension. There is no tenderness.   Musculoskeletal: Normal range of motion. He exhibits edema.   Neurological: He is alert and oriented to person, place, and time. No cranial nerve deficit.   Skin: Skin is warm and dry. No rash noted. No cyanosis or erythema. Nails show no clubbing.   Psychiatric: He has a normal mood and affect.   Vitals reviewed.    /60 (BP Location: Left arm, Patient Position: Sitting)   Pulse 77   Ht 182.9 cm (72\")   Wt 119 kg (262 lb)   SpO2 98%   BMI 35.53 kg/m²     Data/Lab Review:     Lab Results   Component Value Date    TSH 6.240 (H) 09/03/2019     ======================================================================    Pulmonary function test 6/29/2019  1.  Spirometry is consistent with a mild restrictive ventilatory defect.  2.  There is actually slight worsening of spirometry postbronchodilator so that postbronchodilator spirometry is consistent with a mild restrictive ventilatory defect with coexisting mild small airways disease.  3.  Lung volumes actually reveal hyperinflation with no evidence of restriction by total lung capacity criteria.  There is a mild decrease in vital capacity and inspiratory capacity.  4.  There is a moderate diffusion impairment which when corrected for alveolar volume is just a mild diffusion impairment.  5.  Arterial blood gases reveal a mild respiratory alkalosis on room air.  6.  The patient had some difficulty in performing the above maneuvers which could affect the results of these studies somewhat.    ======================================================================    Echocardiogram on 6/11/2019:  · Left ventricular systolic function is normal. Estimated EF appears to be in the range of 61 - 65%.  · Normal right ventricular cavity size and systolic function noted.  · Mild mitral valve " regurgitation is present  · Trace to mild tricuspid valve regurgitation is present. Estimated right ventricular systolic pressure from tricuspid regurgitation is normal (<35 mmHg).    ======================================================================    Nuclear stress test on 6/10/2019:  · Myocardial perfusion imaging indicates a normal myocardial perfusion study with no evidence of ischemia.  · Left ventricular ejection fraction is normal (Calculated EF = 67%).  · Impressions are consistent with a low risk study.    ======================================================================    Cardiac cath and PCI to the saphenous vein graft to the diagonal branch on 1/25/2019:    Selective Coronary Angiography:     Left Main Coronary Artery: The left main coronary artery arises from the left coronary cusp and bifurcates into the LAD and left circumflex arteries. Luminal irregularities are present, but no significant disease.     Left Anterior Descending Artery: The left anterior descending artery arises from the distal left main coronary artery and supplies one diagonal branch and then is occluded.  There appears to be a previously placed stent is patent in the proximal portion of the LAD.      Left Circumflex: The left circumflex artery arises from the distal left main artery and supplies multiple small caliber obtuse marginal branches which are diffusely diseased but without focal lesions identified.     Right Coronary Artery: The right coronary artery arises from the right coronary cusp and is dominant for the posterior circulation.  The right coronary artery is diffusely throughout the course the vessel.  Irregularities up to 40-50 percent are present in various locations throughout the vessel.  Distally, the right coronary artery provides flow to the posterior descending artery which is diffusely disease as well as multiple posterolateral branches.     Bypass Graft Angiography:     Saphenous vein graft to the  obtuse marginal was not engaged during this visit as this has previously been noted to be occluded.     Saphenous vein graft to the diagonal:  The saphenous vein graft to the diagonal is noted, prior to injection, to have multiple stents throughout the graft, in the proximal portion as well as the distal portion of the graft.  Upon injection, the proximal stent is widely patent.  Just proximal to the distal stent, however, there is a 90% focal stenosis.  After the anastomosis, this graft provides flow to essentially one obtuse marginal branch.  There is also some degree of stenosis at the anastomosis as well.     Left internal mammary artery to the left anterior descending artery: The left internal mammary artery is widely patent.  This graft inserts into the midportion of the LAD.  After the insertion, the LAD has likely a 50% stenosis in the apical portion of the LAD.     Percutaneous Coronary Intervention to the saphenous vein graft to the diagonal:      Guide Catheter: 6 Georgian JR4  Anticoagulation: Unfractionated heparin  Wire(s): 0.014 inch BMW  Stent (s): 3.0 x 12 mm Xience (post dilated with 3.25 mm NC balloon)     Unfractionated heparin was a  for a coagulation during the procedure.  Initially, a filter wire was attempted to be advanced down the saphenous vein graft filter wire was unable to pass the area of stenosis.  Therefore, this was exchanged for a BMW wire.  With a BMW wire in place, the lesion was directly stented with a 3.0 x 12 mm Xience FILIBERTO.  Following this, initially, a 3.0 mm noncompliant balloon was used to post dilate the stent.  After this, a 3.25 mm noncompliant balloon was used to post dilate the stent as well.  Following this, final angiography showed PANTERA-3 flow down the saphenous vein graft was no significant residual stenosis at the stent site.  The flow down the obtuse marginal branch is improved.  There is no evidence of distal embolization, dissection, perforation of the  vessels.      Assessment:          Diagnosis Plan   1. Coronary artery disease of native artery of native heart with stable angina pectoris (CMS/[facility])  ECG 12 Lead   2. Chronic atrial fibrillation (CMS/[facility])     3. Essential hypertension     4. Chronic diastolic congestive heart failure (CMS/[facility])     5. Shortness of breath            Plan:       1.  Coronary artery disease: The patient remains clinically stable with no ischemic symptoms.  He did undergo PCI in January 2019, therefore will need to remain on Brilinta until January 2020.  He is currently not on aspirin as he is also on Xarelto.  He remains on beta-blocker and high intensity statin therapies.  Given his history of chronic stable angina, he is also on Ranexa as well as isosorbide mononitrate.  As stated, he is stable at this time.    2.  Chronic atrial fibrillation: The patient remains in atrial fibrillation with his heart rate well controlled.  He remains anticoagulated and he is tolerating this well.    3.  Essential hypertension: Blood pressure remains well controlled at this time.  Continue current medications.    4.  Chronic diastolic congestive heart failure: The patient has diastolic dysfunction and has chronic shortness of breath.  He also has some degree of lung disease as well not to mention his coronary artery disease and atrial fibrillation, therefore his symptoms are likely multifactorial.  He appears to be euvolemic on examination at this time, however.    5.  Shortness of breath: Thought to be multifactorial.  The patient appears to be improving after recently starting on his inhalers.  He will also have a sleep study in the near future.    Patient's Body mass index is 35.53 kg/m². BMI is above normal parameters. Recommendations include: exercise counseling and nutrition counseling.    Follow-up: 3 months unless needed sooner

## 2019-09-10 PROBLEM — I20.0 UNSTABLE ANGINA PECTORIS (HCC): Status: RESOLVED | Noted: 2019-01-01 | Resolved: 2019-01-01

## 2019-09-10 PROBLEM — I25.118 CORONARY ARTERY DISEASE OF NATIVE ARTERY OF NATIVE HEART WITH STABLE ANGINA PECTORIS (HCC): Status: ACTIVE | Noted: 2019-01-01

## 2019-10-23 PROBLEM — G47.33 OBSTRUCTIVE SLEEP APNEA: Status: ACTIVE | Noted: 2019-01-01

## 2019-10-23 NOTE — PROGRESS NOTES
"CLARIBEL Pringle  [facility]   Respiratory Disease Clinic  [address] [place] [address]  Phone: [phone]  Fax: [phone]     Ha Agrawal is a 79 y.o. male.   CC:   Chief Complaint   Patient presents with   • Shortness of Breath        HPI: Mr. Agrawal is coming in for a follow-up of his shortness of breath.  He experiences moderate persistent shortness of breath occurring in the chest daily for the last 2 to 3 years.  It is accompanied by cough.  It is worsened by activity and improved with rest.  He has been prescribed oxygen for this although it was not helpful.  He does benefit from diuresis.  He reports since I saw him last he had an episode of the weekend with increased fatigue and shortness of breath.  He \"doubled up on his Lasix\" and by the following day he was back to normal.  He saw his primary care provider on Monday who prescribed his Lasix to take 1-2 times a day as needed.  He is watching his weight.  He is not avoiding salt.  He is not monitoring his blood pressure.  He has some occupational exposure risk.  He was given samples of low-dose Spiriva and high-dose as well as low-dose Asmanex to trial for his symptoms.  It is not helped with his fatigue or shortness of breath however it has eliminated his cough entirely.  He thinks the Asmanex was more beneficial.  He is a history of elevated TSH.  This was repeated which remains elevated.  He is instructed to contact his primary care provider for further evaluation of this.  He indicates they have not adjusted his thyroid medication as far as he knows.  He follows nephrology as well for kidney disease.  He is due to see them in the next few weeks. I also referred him for a sleep study.  Phase 1 of the study does confirm obstructive sleep apnea.  It appears he is awaiting titration.  When I inquired about this indicates they have already contacted him to \"bring me a CPAP\".  His wife indicates that was 3 weeks ago " however nobody else is reached out to him about dropping it off.  His condition is complicated by his obesity as well as his chronic atrial fibrillation.  At his most recent cardiology follow-up this appeared to be stable.  He is due to see Dr. Weinstein again in a couple of weeks.    The following portions of the patient's history were reviewed and updated as appropriate: allergies, current medications, past family history, past medical history, past social history, past surgical history and problem list.    Past Medical History:   Diagnosis Date   • Angina pectoris (CMS/[facility])    • Chronic kidney disease    • Coronary artery disease involving native coronary artery of native heart without angina pectoris 3/10/2017   • Diabetes mellitus (CMS/[facility])    • Essential hypertension    • Hx of CABG 1/11/2018   • Mixed hyperlipidemia    • Obstructive sleep apnea 10/23/2019   • Paroxysmal atrial fibrillation (CMS/[facility])    • Personal history of nicotine dependence 8/26/2019   • Shortness of breath    • Tobacco use        Prior to Admission medications    Medication Sig Start Date End Date Taking? Authorizing Provider   atorvastatin (LIPITOR) 40 MG tablet Take 1 tablet by mouth Every Night. 1/26/19   Ricardo Andrade DO   diazepam (VALIUM) 5 MG tablet Take 5 mg by mouth Daily As Needed for Sleep.    Sekou Hurtado MD   fenofibrate (TRICOR) 145 MG tablet Take 145 mg by mouth daily.    Sekou Hurtado MD   furosemide (LASIX) 40 MG tablet Take 40 mg by mouth daily.    Sekou Hurtado MD   Insulin Glargine (BASAGLAR KWIKPEN) 100 UNIT/ML injection pen Inject 80 Units under the skin into the appropriate area as directed Every Night.    Sekou Hurtado MD   insulin regular (NOVOLIN R) 100 UNIT/ML injection Inject  under the skin into the appropriate area as directed Daily. BG - 100 divided by 20.    Sekou Hurtado MD   isosorbide mononitrate (IMDUR) 120 MG 24 hr tablet Take 1 tablet by mouth Daily.  6/4/19   Rocio Shaffer APRN   lisinopril (PRINIVIL,ZESTRIL) 5 MG tablet Take 5 mg by mouth Daily.    Sekou Hurtado MD   METOPROLOL SUCCINATE ER PO Take 75 mg by mouth Daily.    Sekou Hurtado MD   Multiple Vitamins-Minerals (MULTIVITAMIN ADULT PO) Take 1 tablet by mouth Daily.    Sekou Hurtado MD   nitroglycerin (NITROSTAT) 0.4 MG SL tablet Place 0.4 mg under the tongue Every 5 (Five) Minutes As Needed for Chest Pain. Take no more than 3 doses in 15 minutes.    Sekou Hurtado MD   Omega-3 Fatty Acids (FISH OIL) 1200 MG capsule delayed-release Take 1,200 mg by mouth 2 (two) times a day.    Sekou Hurtado MD   pantoprazole (PROTONIX) 20 MG EC tablet Take 20 mg by mouth Daily.    Sekou Hurtado MD   potassium chloride (K-DUR,KLOR-CON) 20 MEQ CR tablet Take 20 mEq by mouth Daily.    Sekou Hurtado MD   ranolazine (RANEXA) 1000 MG 12 hr tablet Take 1 tablet by mouth 2 (Two) Times a Day. 6/4/19   Rocio Shaffer APRN   rivaroxaban (XARELTO) 20 MG tablet Take 1 tablet by mouth Daily With Dinner. 6/12/19   Yue Leary APRN   sertraline (ZOLOFT) 25 MG tablet Take 25 mg by mouth daily.    Sekou Hurtado MD   sodium bicarbonate 650 MG tablet Take 650 mg by mouth 2 (Two) Times a Day.    Sekou Hurtado MD   tamsulosin (FLOMAX) 0.4 MG capsule 24 hr capsule Take 1 capsule by mouth daily.    Sekou Hurtado MD   ticagrelor (BRILINTA) 90 MG tablet tablet Take 1 tablet by mouth 2 (Two) Times a Day. 1/26/19   Knees, Deisy E, APRBRANDON   vitamin D (ERGOCALCIFEROL) 15445 UNITS capsule capsule Take 50,000 Units by mouth 1 (One) Time Per Week. Monday.    Sekou Hurtado MD   vitamin E 1000 UNIT capsule Take 1,000 Units by mouth daily.    Sekou Hurtado MD       Family History   Problem Relation Age of Onset   • Heart attack Mother    • Heart disease Mother    • Heart attack Father    • Heart disease Father    • Heart disease Brother    • Colon  polyps Brother    • Heart disease Maternal Grandmother    • Heart attack Maternal Grandmother    • Heart disease Maternal Grandfather    • Heart attack Maternal Grandfather    • Heart disease Paternal Grandmother    • Heart attack Paternal Grandmother    • Heart disease Paternal Grandfather    • Heart attack Paternal Grandfather    • Alzheimer's disease Sister    • Colon cancer Neg Hx    • Esophageal cancer Neg Hx        Social History     Socioeconomic History   • Marital status:      Spouse name: Not on file   • Number of children: Not on file   • Years of education: Not on file   • Highest education level: Not on file   Tobacco Use   • Smoking status: Former Smoker     Types: Pipe     Last attempt to quit: 2004     Years since quitting: 15.8   • Smokeless tobacco: Never Used   • Tobacco comment: QUIT AT AGE 63   Substance and Sexual Activity   • Alcohol use: Yes     Comment: Socially   • Drug use: No   • Sexual activity: Defer       Review of Systems   Constitutional: Positive for fatigue. Negative for activity change, chills and fever.   HENT: Negative for congestion, postnasal drip, rhinorrhea, sinus pressure, sore throat and trouble swallowing.    Eyes: Negative for blurred vision, double vision and pain.   Respiratory: Positive for cough and shortness of breath. Negative for chest tightness and wheezing.    Cardiovascular: Positive for palpitations and leg swelling. Negative for chest pain.   Gastrointestinal: Negative for abdominal distention, constipation, diarrhea, nausea and vomiting.   Endocrine: Negative for polydipsia, polyphagia and polyuria.   Genitourinary: Negative for dysuria, frequency and urgency.   Musculoskeletal: Negative for arthralgias, back pain, gait problem and joint swelling.   Skin: Negative for color change, dry skin, rash and skin lesions.   Allergic/Immunologic: Negative for environmental allergies, food allergies and immunocompromised state.   Neurological: Positive for  "weakness. Negative for dizziness, seizures, speech difficulty, light-headedness, memory problem and confusion.   Hematological: Negative for adenopathy. Does not bruise/bleed easily.   Psychiatric/Behavioral: Positive for sleep disturbance. Negative for negative for hyperactivity and depressed mood. The patient is not nervous/anxious.        /70   Pulse 88   Ht 182.9 cm (72\")   Wt 122 kg (269 lb)   SpO2 94% Comment: 3L  BMI 36.48 kg/m²     Physical Exam   Constitutional: He is oriented to person, place, and time. He appears well-developed and well-nourished. No distress. Nasal cannula in place.   HENT:   Head: Normocephalic and atraumatic.   Right Ear: External ear normal.   Left Ear: External ear normal.   Nose: Nose normal.   Mouth/Throat: Oropharynx is clear and moist. No oropharyngeal exudate.   Eyes: Conjunctivae and EOM are normal. Pupils are equal, round, and reactive to light. Right eye exhibits no discharge. Left eye exhibits no discharge.   Neck: Normal range of motion. Neck supple. No JVD present.   Cardiovascular: Normal rate and regular rhythm.   No murmur heard.  Pulmonary/Chest: Effort normal and breath sounds normal. No respiratory distress. He has no wheezes.   Abdominal: Soft. Bowel sounds are normal. He exhibits no distension. There is no tenderness.   obese   Musculoskeletal: Normal range of motion. He exhibits edema. He exhibits no deformity.   Neurological: He is alert and oriented to person, place, and time. He displays normal reflexes. No cranial nerve deficit. Coordination normal.   Skin: Skin is warm and dry. No rash noted. He is not diaphoretic. No erythema.   Psychiatric: He has a normal mood and affect. His behavior is normal. Thought content normal.   Nursing note and vitals reviewed.      Pulmonary Functions Testing Results:    No results found for: FEV1  No notes on file    No PFTs for this visit    CXR: No imaging for this visit        Problem List Items Addressed This " Visit        Cardiovascular and Mediastinum    Chronic atrial fibrillation (Chronic)       Respiratory    Shortness of breath - Primary    Relevant Medications    Mometasone Furoate (ASMANEX HFA) 200 MCG/ACT aerosol    Obstructive sleep apnea       Other    Elevated TSH    Personal history of nicotine dependence        Patient's Body mass index is 36.48 kg/m². BMI is above normal parameters. Recommendations include: educational material.      Assessment/Plan         Sleep study confirms sleep apnea.  He is still awaiting titration.  We have reached out to the sleep lab about the miscommunications with the device.  Once we have an answer for this we will contact the patient.  He responded well to the Asmanex.  He was given additional samples.  He seems to respond well to the Lasix with improvement in his symptoms.  I recommend he discuss this with his other providers.  I also recommended he make his nephrologist aware that they are increasing his diuretics. Reassessment in a couple of months after he is been on the sleep apnea device to see if this is benefiting him at all.  He is up-to-date on his flu pneumonia vaccinations.    Irena Alcocer, CLARIBEL  10/29/2019  4:21 PM    Return in about 2 months (around 12/29/2019).

## 2019-10-29 NOTE — PATIENT INSTRUCTIONS

## 2019-11-18 NOTE — DISCHARGE INSTRUCTIONS
Anemia    Anemia is a condition in which you do not have enough red blood cells or hemoglobin. Hemoglobin is a substance in red blood cells that carries oxygen. When you do not have enough red blood cells or hemoglobin (are anemic), your body cannot get enough oxygen and your organs may not work properly. As a result, you may feel very tired or have other problems.  What are the causes?  Common causes of anemia include:  · Excessive bleeding. Anemia can be caused by excessive bleeding inside or outside the body, including bleeding from the intestine or from periods in women.  · Poor nutrition.  · Long-lasting (chronic) kidney, thyroid, and liver disease.  · Bone marrow disorders.  · Cancer and treatments for cancer.  · HIV (human immunodeficiency virus) and AIDS (acquired immunodeficiency syndrome).  · Treatments for HIV and AIDS.  · Spleen problems.  · Blood disorders.  · Infections, medicines, and autoimmune disorders that destroy red blood cells.  What are the signs or symptoms?  Symptoms of this condition include:  · Minor weakness.  · Dizziness.  · Headache.  · Feeling heartbeats that are irregular or faster than normal (palpitations).  · Shortness of breath, especially with exercise.  · Paleness.  · Cold sensitivity.  · Indigestion.  · Nausea.  · Difficulty sleeping.  · Difficulty concentrating.  Symptoms may occur suddenly or develop slowly. If your anemia is mild, you may not have symptoms.  How is this diagnosed?  This condition is diagnosed based on:  · Blood tests.  · Your medical history.  · A physical exam.  · Bone marrow biopsy.  Your health care provider may also check your stool (feces) for blood and may do additional testing to look for the cause of your bleeding.  You may also have other tests, including:  · Imaging tests, such as a CT scan or MRI.  · Endoscopy.  · Colonoscopy.  How is this treated?  Treatment for this condition depends on the cause. If you continue to lose a lot of blood, you may  need to be treated at a hospital. Treatment may include:  · Taking supplements of iron, vitamin B12, or folic acid.  · Taking a hormone medicine (erythropoietin) that can help to stimulate red blood cell growth.  · Having a blood transfusion. This may be needed if you lose a lot of blood.  · Making changes to your diet.  · Having surgery to remove your spleen.  Follow these instructions at home:  · Take over-the-counter and prescription medicines only as told by your health care provider.  · Take supplements only as told by your health care provider.  · Follow any diet instructions that you were given.  · Keep all follow-up visits as told by your health care provider. This is important.  Contact a health care provider if:  · You develop new bleeding anywhere in the body.  Get help right away if:  · You are very weak.  · You are short of breath.  · You have pain in your abdomen or chest.  · You are dizzy or feel faint.  · You have trouble concentrating.  · You have bloody or black, tarry stools.  · You vomit repeatedly or you vomit up blood.  Summary  · Anemia is a condition in which you do not have enough red blood cells or enough of a substance in your red blood cells that carries oxygen (hemoglobin).  · Symptoms may occur suddenly or develop slowly.  · If your anemia is mild, you may not have symptoms.  · This condition is diagnosed with blood tests as well as a medical history and physical exam. Other tests may be needed.  · Treatment for this condition depends on the cause of the anemia.  This information is not intended to replace advice given to you by your health care provider. Make sure you discuss any questions you have with your health care provider.  Document Released: 01/25/2006 Document Revised: 01/19/2018 Document Reviewed: 01/19/2018  [employer] Patient Education © 2019 [employer]  Patient has his potassium rechecked and have follow-up with urology and nephrology

## 2019-11-18 NOTE — ED PROVIDER NOTES
Subjective   Patient is home oxygen dependent and came to the ED complaining of right flank pain radiating to the groin        Flank Pain   Pain location:  R flank  Pain quality: sharp and shooting    Pain radiates to:  Groin  Pain severity:  Moderate  Onset quality:  Gradual  Timing:  Constant  Progression:  Worsening  Chronicity:  Recurrent  Context: not alcohol use, not awakening from sleep, not diet changes, not eating, not laxative use, not previous surgeries, not recent illness, not sick contacts and not suspicious food intake    Relieved by:  Nothing  Worsened by:  Nothing  Ineffective treatments:  None tried  Associated symptoms: no anorexia, no belching, no chest pain, no chills, no constipation, no cough, no diarrhea, no dysuria, no fatigue, no fever, no hematemesis, no hematochezia, no hematuria, no melena, no nausea, no sore throat and no vomiting    Risk factors: aspirin and obesity    Risk factors: no alcohol abuse and no recent hospitalization    Difficulty Urinating   Presenting symptoms: no dysuria    Associated symptoms: flank pain    Associated symptoms: no diarrhea, no fever, no hematuria, no nausea and no vomiting        Review of Systems   Constitutional: Negative.  Negative for chills, fatigue and fever.   HENT: Negative.  Negative for sore throat.    Eyes: Negative.    Respiratory: Negative.  Negative for cough.    Cardiovascular: Negative.  Negative for chest pain.   Gastrointestinal: Negative for anorexia, constipation, diarrhea, hematemesis, hematochezia, melena, nausea and vomiting.   Endocrine: Negative.    Genitourinary: Positive for difficulty urinating and flank pain. Negative for dysuria and hematuria.   Skin: Negative.    Neurological: Negative.    Hematological: Negative.    All other systems reviewed and are negative.      Past Medical History:   Diagnosis Date   • Angina pectoris (CMS/HCC)    • Chronic kidney disease    • Coronary artery disease involving native coronary artery  of native heart without angina pectoris 3/10/2017   • Diabetes mellitus (CMS/[facility])    • Essential hypertension    • Hx of CABG 1/11/2018   • Mixed hyperlipidemia    • Obstructive sleep apnea 10/23/2019   • Paroxysmal atrial fibrillation (CMS/[facility])    • Personal history of nicotine dependence 8/26/2019   • Shortness of breath    • Tobacco use        Allergies   Allergen Reactions   • Adhesive Tape Dermatitis   • Penicillins Rash       Past Surgical History:   Procedure Laterality Date   • CARDIAC CATHETERIZATION     • CARDIAC CATHETERIZATION N/A 1/25/2019    Procedure: Left Heart Cath, coronaries, grafts, possible;  Surgeon: Gabriele Weinstein MD;  Location: [facility] CATH INVASIVE LOCATION;  Service: Cardiology   • CATARACT EXTRACTION     • CORONARY ARTERY BYPASS GRAFT     • CORONARY STENT PLACEMENT  03/2015       Family History   Problem Relation Age of Onset   • Heart attack Mother    • Heart disease Mother    • Heart attack Father    • Heart disease Father    • Heart disease Brother    • Colon polyps Brother    • Heart disease Maternal Grandmother    • Heart attack Maternal Grandmother    • Heart disease Maternal Grandfather    • Heart attack Maternal Grandfather    • Heart disease Paternal Grandmother    • Heart attack Paternal Grandmother    • Heart disease Paternal Grandfather    • Heart attack Paternal Grandfather    • Alzheimer's disease Sister    • Colon cancer Neg Hx    • Esophageal cancer Neg Hx        Social History     Socioeconomic History   • Marital status:      Spouse name: Not on file   • Number of children: Not on file   • Years of education: Not on file   • Highest education level: Not on file   Tobacco Use   • Smoking status: Former Smoker     Types: Pipe     Last attempt to quit: 2004     Years since quitting: 15.8   • Smokeless tobacco: Never Used   • Tobacco comment: QUIT AT AGE 63   Substance and Sexual Activity   • Alcohol use: Yes     Comment: Socially   • Drug use: No   • Sexual  activity: Defer           Objective   Physical Exam   Constitutional: He is oriented to person, place, and time. He appears well-developed and well-nourished.  Non-toxic appearance.   HENT:   Head: Normocephalic and atraumatic.   Mouth/Throat: Oropharynx is clear and moist.   Eyes: Conjunctivae are normal. Pupils are equal, round, and reactive to light.   Neck: Normal range of motion. Neck supple. No hepatojugular reflux and no JVD present.   Cardiovascular: Regular rhythm, normal heart sounds and intact distal pulses. Tachycardia present. PMI is not displaced. Exam reveals no decreased pulses.   No murmur heard.  Pulmonary/Chest: Effort normal and breath sounds normal. No accessory muscle usage. No apnea. No respiratory distress. He has no decreased breath sounds. He has no wheezes.   Abdominal: Normal appearance, normal aorta and bowel sounds are normal. He exhibits no shifting dullness, no distension, no fluid wave, no abdominal bruit, no ascites, no pulsatile midline mass and no mass. There is no tenderness. There is CVA tenderness. There is no guarding.   Musculoskeletal: Normal range of motion.   Neurological: He is alert and oriented to person, place, and time. He has normal strength and normal reflexes. No cranial nerve deficit. GCS eye subscore is 4. GCS verbal subscore is 5. GCS motor subscore is 6.   Skin: Skin is warm and dry.   Psychiatric: He has a normal mood and affect. His behavior is normal.   Nursing note and vitals reviewed.      Procedures           ED Course  ED Course as of Nov 18 0828   Mon Nov 18, 2019   0648 · Myocardial perfusion imaging indicates a normal myocardial perfusion study with no evidence of ischemia.  · Left ventricular ejection fraction is normal (Calculated EF = 67%).  · Impressions are consistent with a low risk study.6/10/19  [TS]   0884 Case discussed at length with the patient he is got hematuria his kidney function is worse and his potassium is slightly hemoglobin is  gone down also I have discussed all the abnormal findings of the lab work-up and the CAT scan with him the patient does not want to stay in the hospital he wants to go home his pain is completely gone repeat examination of the right side does not reveal any right lower quadrant tenderness or guarding so clinically does not have appendicitis.  [TS]   0820 Impression    1. Gallstones. Left hepatic lobe cyst.  2. There is a 5 mm ureteral stone on the right at the ureterovesical  junction. There is mild to moderate dilatation of the right renal  collecting system and minimal dilatation of the right ureter. There are  nonobstructive renal stones bilaterally. There is a 2.5 cm exophytic  right renal cyst.  3. The appendix is mildly dilated at 1.1 cm distally. There are  appendicoliths in the appendix. There are no definite inflammatory  changes around the appendix. Correlate clinically. Appendicitis is not  fully excluded given the above findings.  4. Diverticulosis of the colon. Moderate stool.  5. Bilateral fat-containing inguinal hernias. Small fat-containing  umbilical hernia.    Patient was informed of findings and the reason to follow-up  [TS]   0820  The patient's symptoms are now better.  Patient is not having pain.  No chest pain, difficulty breathing, nausea, vomiting or palpitations.  No anxiety or dizziness.  Vital signs have been reviewed and appear to be correct.  Physical exam findings are improved.  Alert.  Oriented X3 .  No acute distress.  Breath sounds normal.  No respiratory distress, decreased breath sounds or wheezes.  Normal heart rate and rhythm.  Heart sounds normal.  .  Abdomen soft and nontender.  No abdominal tenderness or guarding or rebound tenderness.  Skin warm and dry.  No cyanosis or diaphoresis.  Oriented X 3.  Not anxious.  No alteration in mental status or weakness.          [TS]   0820 Risks and benefits of treatments given and alternative treatment options discussed with  patient/family. I answered all the questions in simple, plain language, and there was voiced understanding and agreement with plan of care. There were no further questions. Differential diagnosis discussed. Patient/family was advised that the practice of medicine is not always an exact science, and sometimes tests, physical exam, or history may not show the underlying conditions with certainty. Additionally, the condition may change or show itself later after initial presentation. There was also expressed understanding and agreement with this limitation of emergency medicine practice. Patient/family was asked to return to ED if any problem or issues or if condition worsens or does not improved. Patient/family agreed to follow up with PCP/specialist as advised, or return to ED if unable to see a provider in a timely fashion for continued symptoms.   [TS]   0826 [id number] ted   [TS]      ED Course User Index  [TS] Thom Goldsmith MD                  MDM  Number of Diagnoses or Management Options  Diagnosis management comments: DD   mesenteric lymphadenitis, diverticulitis, intussusception, small bowel obstruction, adhesions, bowel ischemia,intraabdominal abscess, spontaneous bacterial peritonitis, hernia, urinary tract infection, ureterolithiasis,acute appendicitis, abdominal aortic aneurysm, pneumonia, porphyria and diabetic ketoacidosis as a possible cause of abdominal pain in this patient. This is a partial list of diagnoses considered.         Amount and/or Complexity of Data Reviewed  Clinical lab tests: reviewed and ordered  Tests in the radiology section of CPT®: ordered and reviewed  Tests in the medicine section of CPT®: reviewed and ordered    Risk of Complications, Morbidity, and/or Mortality  Presenting problems: moderate  Diagnostic procedures: moderate  Management options: moderate        Final diagnoses:   Ureteric calculus   Hydronephrosis, unspecified hydronephrosis type   Acute on chronic renal  insufficiency   Anemia, unspecified type   Calculus of gallbladder without cholecystitis without obstruction   Renal cyst   Renal lesion   Other microscopic hematuria              Thom Goldsmith MD  11/18/19 [id number]

## 2019-11-19 NOTE — PROGRESS NOTES
"    Subjective:     Encounter Date:11/19/2019      Patient ID: Ha Agrawal is a 79 y.o. male with coronary artery disease, status post previous coronary artery bypass grafting as well as PCI to the saphenous vein graft to the obtuse marginal in January 2019, chronic diastolic dysfunction, paroxysmal atrial fibrillation/flutter, hypertension, hyperlipidemia, type 2 diabetes mellitus, stage III chronic kidney disease, chronic shortness of breath and dyspnea on exertion, presenting for follow-up.    Chief Complaint: Shortness of breath    Shortness of Breath   This is a chronic problem. The problem occurs constantly. The problem has been waxing and waning. Associated symptoms include leg swelling and sputum production (\"muddy looking\"). Pertinent negatives include no abdominal pain, chest pain, claudication, headaches, hemoptysis, orthopnea, PND, syncope, vomiting or wheezing. The symptoms are aggravated by any activity. Treatments tried: inhalers and O2. The treatment provided mild relief. His past medical history is significant for CAD and a heart failure (diastolic).     This patient presents today for routine follow-up.  He continues to be short of breath with almost any exertion.  He is wearing oxygen at this time and has multiple inhalers that he has tried.  He says that neither 1 of these had made much difference in his shortness of breath.  He denies any chest discomfort or palpitations at this time.  No orthopnea or PND.  He was having increasing lower extremity edema was increased on his Lasix.  This has helped with his lower extremity edema.  Also, the patient has stage III chronic kidney disease that is being monitored closely.  He was recently diagnosed with a kidney stone as well.  Unfortunately, this has not passed at this time.  He is no longer expensing pain from this, however.  The patient mitts to a daily cough that is productive of what he describes as \"muddy\" sputum.  He has some generalized " fatigue but nothing more than usual.  No problems or side effects from any of his current medications.      Current Outpatient Medications:   •  atorvastatin (LIPITOR) 40 MG tablet, Take 1 tablet by mouth Every Night., Disp: 30 tablet, Rfl: 2  •  diazepam (VALIUM) 5 MG tablet, Take 5 mg by mouth Daily As Needed for Sleep., Disp: , Rfl:   •  fenofibrate (TRICOR) 145 MG tablet, Take 145 mg by mouth daily., Disp: , Rfl:   •  furosemide (LASIX) 40 MG tablet, Take 40 mg by mouth daily., Disp: , Rfl:   •  Insulin Glargine (BASAGLAR KWIKPEN) 100 UNIT/ML injection pen, Inject 80 Units under the skin into the appropriate area as directed Every Night., Disp: , Rfl:   •  insulin regular (NOVOLIN R) 100 UNIT/ML injection, Inject  under the skin into the appropriate area as directed Daily. BG - 100 divided by 20., Disp: , Rfl:   •  isosorbide mononitrate (IMDUR) 120 MG 24 hr tablet, Take 1 tablet by mouth Daily., Disp: 90 tablet, Rfl: 3  •  levothyroxine (SYNTHROID, LEVOTHROID) 25 MCG tablet, 1/2 tablet once a day, Disp: , Rfl: 1  •  lisinopril (PRINIVIL,ZESTRIL) 5 MG tablet, Take 5 mg by mouth Daily., Disp: , Rfl:   •  METOPROLOL SUCCINATE ER PO, Take 75 mg by mouth Daily., Disp: , Rfl:   •  mometasone (ASMANEX TWISTHALER) inhaler 220 mcg/inhalation, Inhale 1 puff 2 (Two) Times a Day., Disp: , Rfl:   •  Multiple Vitamins-Minerals (MULTIVITAMIN ADULT PO), Take 1 tablet by mouth Daily., Disp: , Rfl:   •  nitroglycerin (NITROSTAT) 0.4 MG SL tablet, Place 0.4 mg under the tongue Every 5 (Five) Minutes As Needed for Chest Pain. Take no more than 3 doses in 15 minutes., Disp: , Rfl:   •  Omega-3 Fatty Acids (FISH OIL) 1200 MG capsule delayed-release, Take 1,200 mg by mouth 2 (two) times a day., Disp: , Rfl:   •  oxyCODONE-acetaminophen (PERCOCET) 7.5-325 MG per tablet, Take 1 tablet by mouth Every 6 (Six) Hours As Needed for Moderate Pain ., Disp: 12 tablet, Rfl: 0  •  pantoprazole (PROTONIX) 20 MG EC tablet, Take 20 mg by mouth  "Daily., Disp: , Rfl:   •  potassium chloride (K-DUR,KLOR-CON) 20 MEQ CR tablet, Take 20 mEq by mouth Daily., Disp: , Rfl:   •  ranolazine (RANEXA) 1000 MG 12 hr tablet, Take 1 tablet by mouth 2 (Two) Times a Day., Disp: 180 tablet, Rfl: 3  •  rivaroxaban (XARELTO) 20 MG tablet, Take 1 tablet by mouth Daily With Dinner., Disp: 30 tablet, Rfl: 1  •  sertraline (ZOLOFT) 25 MG tablet, Take 25 mg by mouth daily., Disp: , Rfl:   •  sodium bicarbonate 650 MG tablet, Take 650 mg by mouth 2 (Two) Times a Day., Disp: , Rfl:   •  tamsulosin (FLOMAX) 0.4 MG capsule 24 hr capsule, Take 1 capsule by mouth daily., Disp: , Rfl:   •  ticagrelor (BRILINTA) 90 MG tablet tablet, Take 1 tablet by mouth 2 (Two) Times a Day., Disp: 60 tablet, Rfl: 11  •  vitamin D (ERGOCALCIFEROL) 97192 UNITS capsule capsule, Take 50,000 Units by mouth 1 (One) Time Per Week. Monday., Disp: , Rfl:   •  vitamin E 1000 UNIT capsule, Take 1,000 Units by mouth daily., Disp: , Rfl:     Allergies   Allergen Reactions   • Adhesive Tape Dermatitis   • Penicillins Rash     Social History     Tobacco Use   • Smoking status: Former Smoker     Types: Pipe     Last attempt to quit: 2004     Years since quitting: 15.8   • Smokeless tobacco: Never Used   • Tobacco comment: QUIT AT AGE 63   Substance Use Topics   • Alcohol use: Yes     Comment: Socially     Review of Systems   Constitution: Negative for weakness and weight loss.   Cardiovascular: Positive for dyspnea on exertion and leg swelling. Negative for chest pain, claudication, orthopnea, palpitations, paroxysmal nocturnal dyspnea and syncope.   Respiratory: Positive for cough, shortness of breath and sputum production (\"muddy looking\"). Negative for hemoptysis and wheezing.    Endocrine: Negative for cold intolerance and heat intolerance.   Hematologic/Lymphatic: Negative for bleeding problem. Does not bruise/bleed easily.   Gastrointestinal: Negative for abdominal pain, hematemesis, hematochezia, melena, nausea " "and vomiting.   Neurological: Negative for dizziness, headaches and loss of balance.       ECG 12 Lead  Date/Time: 11/19/2019 3:50 PM  Performed by: Gabriele Weinstein MD  Authorized by: Gabriele Weinstein MD   Rhythm: atrial fibrillation  Rate: normal  BPM: 76  Conduction: conduction normal  QRS axis: normal  Other findings: non-specific ST-T wave changes    Clinical impression: abnormal EKG             Objective:     Physical Exam   Constitutional: He is oriented to person, place, and time. He appears well-developed and well-nourished. No distress. Nasal cannula in place.   HENT:   Head: Normocephalic and atraumatic.   Mouth/Throat: Oropharynx is clear and moist.   Eyes: EOM are normal. Pupils are equal, round, and reactive to light.   Neck: Normal range of motion. Neck supple. No JVD present. No thyromegaly present.   Cardiovascular: Normal rate, S1 normal, S2 normal, normal heart sounds and intact distal pulses. An irregularly irregular rhythm present. Exam reveals no gallop and no friction rub.   No murmur heard.  Pulmonary/Chest: Effort normal and breath sounds normal. He has no decreased breath sounds. He has no wheezes. He has no rhonchi. He has no rales.   Abdominal: Soft. Bowel sounds are normal. He exhibits no distension. There is no tenderness.   Musculoskeletal: Normal range of motion. He exhibits edema (trace at ankles only).   Neurological: He is alert and oriented to person, place, and time. No cranial nerve deficit.   Skin: Skin is warm and dry. No rash noted. No cyanosis or erythema. Nails show no clubbing.   Psychiatric: He has a normal mood and affect.   Vitals reviewed.    /60 (BP Location: Left arm, Patient Position: Sitting)   Pulse 70   Ht 182.9 cm (72\")   Wt 121 kg (267 lb)   SpO2 90% Comment: 2.5L oxygen  BMI 36.21 kg/m²     Data/Lab Review:     Lab Results   Component Value Date    GLUCOSE 120 (H) 11/18/2019    CALCIUM 9.2 11/18/2019     (L) 11/18/2019    K 5.5 " (H) 11/18/2019    CO2 27.0 11/18/2019    CL 96 (L) 11/18/2019    BUN 34 (H) 11/18/2019    CREATININE 2.85 (H) 11/18/2019    EGFRIFNONA 22 (L) 11/18/2019    BCR 11.9 11/18/2019    ANIONGAP 10.0 11/18/2019     **Previous Cr of 1.59 on 8/13/2019    ======================================================================     Pulmonary function test 6/29/2019  1.  Spirometry is consistent with a mild restrictive ventilatory defect.  2.  There is actually slight worsening of spirometry postbronchodilator so that postbronchodilator spirometry is consistent with a mild restrictive ventilatory defect with coexisting mild small airways disease.  3.  Lung volumes actually reveal hyperinflation with no evidence of restriction by total lung capacity criteria.  There is a mild decrease in vital capacity and inspiratory capacity.  4.  There is a moderate diffusion impairment which when corrected for alveolar volume is just a mild diffusion impairment.  5.  Arterial blood gases reveal a mild respiratory alkalosis on room air.  6.  The patient had some difficulty in performing the above maneuvers which could affect the results of these studies somewhat.     ======================================================================     Echocardiogram on 6/11/2019:  · Left ventricular systolic function is normal. Estimated EF appears to be in the range of 61 - 65%.  · Normal right ventricular cavity size and systolic function noted.  · Mild mitral valve regurgitation is present  · Trace to mild tricuspid valve regurgitation is present. Estimated right ventricular systolic pressure from tricuspid regurgitation is normal (<35 mmHg).     ======================================================================     Nuclear stress test on 6/10/2019:  · Myocardial perfusion imaging indicates a normal myocardial perfusion study with no evidence of ischemia.  · Left ventricular ejection fraction is normal (Calculated EF = 67%).  · Impressions are  consistent with a low risk study.     ======================================================================     Cardiac cath and PCI to the saphenous vein graft to the diagonal branch on 1/25/2019:     Selective Coronary Angiography:     Left Main Coronary Artery: The left main coronary artery arises from the left coronary cusp and bifurcates into the LAD and left circumflex arteries. Luminal irregularities are present, but no significant disease.     Left Anterior Descending Artery: The left anterior descending artery arises from the distal left main coronary artery and supplies one diagonal branch and then is occluded.  There appears to be a previously placed stent is patent in the proximal portion of the LAD.      Left Circumflex: The left circumflex artery arises from the distal left main artery and supplies multiple small caliber obtuse marginal branches which are diffusely diseased but without focal lesions identified.     Right Coronary Artery: The right coronary artery arises from the right coronary cusp and is dominant for the posterior circulation.  The right coronary artery is diffusely throughout the course the vessel.  Irregularities up to 40-50 percent are present in various locations throughout the vessel.  Distally, the right coronary artery provides flow to the posterior descending artery which is diffusely disease as well as multiple posterolateral branches.     Bypass Graft Angiography:     Saphenous vein graft to the obtuse marginal was not engaged during this visit as this has previously been noted to be occluded.     Saphenous vein graft to the diagonal:  The saphenous vein graft to the diagonal is noted, prior to injection, to have multiple stents throughout the graft, in the proximal portion as well as the distal portion of the graft.  Upon injection, the proximal stent is widely patent.  Just proximal to the distal stent, however, there is a 90% focal stenosis.  After the anastomosis, this  graft provides flow to essentially one obtuse marginal branch.  There is also some degree of stenosis at the anastomosis as well.     Left internal mammary artery to the left anterior descending artery: The left internal mammary artery is widely patent.  This graft inserts into the midportion of the LAD.  After the insertion, the LAD has likely a 50% stenosis in the apical portion of the LAD.     Percutaneous Coronary Intervention to the saphenous vein graft to the diagonal:      Guide Catheter: 6 [place] JR4  Anticoagulation: Unfractionated heparin  Wire(s): 0.014 inch BMW  Stent (s): 3.0 x 12 mm Xience (post dilated with 3.25 mm NC balloon)     Unfractionated heparin was a  for a coagulation during the procedure.  Initially, a filter wire was attempted to be advanced down the saphenous vein graft filter wire was unable to pass the area of stenosis.  Therefore, this was exchanged for a BMW wire.  With a BMW wire in place, the lesion was directly stented with a 3.0 x 12 mm Xience FILIBERTO.  Following this, initially, a 3.0 mm noncompliant balloon was used to post dilate the stent.  After this, a 3.25 mm noncompliant balloon was used to post dilate the stent as well.  Following this, final angiography showed PANTERA-3 flow down the saphenous vein graft was no significant residual stenosis at the stent site.  The flow down the obtuse marginal branch is improved.  There is no evidence of distal embolization, dissection, perforation of the vessels.         Assessment:          Diagnosis Plan   1. Coronary artery disease of native artery of native heart with stable angina pectoris (CMS/[facility])  ECG 12 Lead   2. Chronic diastolic congestive heart failure (CMS/[facility])     3. Chronic atrial fibrillation     4. Essential hypertension     5. Shortness of breath  Case Request Cath Lab: Right Heart Cath        Plan:       1.  Coronary artery disease: The patient continues to have shortness of breath, however I do not feel that his  shortness of breath is ischemic, especially given the findings of his nuclear stress test earlier this year.  In terms of his coronary artery disease alone, we will continue current medications at this time.     2.  Chronic diastolic congestive heart failure: Certainly, the patient's shortness of breath is being contributed to by his diastolic dysfunction.  It is difficult to know the patient's volume status.  He has recently been on an increased dose of Lasix however his renal function is now worse based on yesterday's labs.  I think that to further evaluate, we will plan a right heart catheterization to assess volume status and to invasively measure the patient's pulmonary pressures, etc.     3.  Chronic atrial fibrillation: Also, a potential contributor to the patient's shortness of breath this is chronic atrial fibrillation however his rate is well controlled at this time.  He has failed multiple cardioversion attempts in the past.  Remain anticoagulated.     4.  Essential hypertension: Blood pressure remains well controlled at this time.  Continue current medications.     5.  Shortness of breath: We did discuss today that the patient likely has a multitude of reasons to be short of breath, including his history of coronary artery disease, diastolic dysfunction, atrial fibrillation, chronic kidney disease, etc.  As noted above, we will request a right heart catheterization to further evaluate his cardiac and pulmonary pressures invasively.     Patient's Body mass index is 36.21 kg/m². BMI is above normal parameters. Recommendations include: exercise counseling and nutrition counseling.    Follow-up: Pending the results the patient's right heart catheterization

## 2019-12-02 ENCOUNTER — APPOINTMENT (OUTPATIENT)
Dept: SLEEP MEDICINE | Facility: HOSPITAL | Age: 79
End: 2019-12-02

## (undated) DEVICE — INFLATION DEVICE: Brand: ENCORE™ 26

## (undated) DEVICE — PTCA DILATATION CATHETER: Brand: NC QUANTUM APEX™

## (undated) DEVICE — SOLIDIFIER LIQUI LOC PLUS 2000CC

## (undated) DEVICE — EMBOLIC PROTECTION SYSTEM: Brand: FILTERWIRE EZ™

## (undated) DEVICE — PERCLOSE PROGLIDE™ SUTURE-MEDIATED CLOSURE SYSTEM: Brand: PERCLOSE PROGLIDE™

## (undated) DEVICE — SOL IRR NACL 0.9PCT BT 1000ML

## (undated) DEVICE — Device: Brand: MEDEX

## (undated) DEVICE — CANN CO2/O2 NASL A/

## (undated) DEVICE — Device

## (undated) DEVICE — ELECTRD PAD DEFIB A/

## (undated) DEVICE — HI-TORQUE BALANCE MIDDLEWEIGHT UNIVERSAL II GUIDE WIRE STRAIGHT TIP PAK  190 CM: Brand: HI-TORQUE BALANCE MIDDLEWEIGHT UNIVERSAL II

## (undated) DEVICE — KT VLV HEMO MAP ACC PLS LG/BORE MTL/INTRO

## (undated) DEVICE — NC TREK CORONARY DILATATION CATHETER 3.0 MM X 8 MM / RAPID-EXCHANGE: Brand: NC TREK

## (undated) DEVICE — PK CATH CARD 30 CA/4

## (undated) DEVICE — INF TL MULIPACK FR6: Brand: INFINITI

## (undated) DEVICE — PINNACLE INTRODUCER SHEATH: Brand: PINNACLE

## (undated) DEVICE — 6F .070 JR 4 100CM: Brand: CORDIS

## (undated) DEVICE — KT NDL GUIDE STRL 18GA